# Patient Record
Sex: FEMALE | Race: WHITE | Employment: OTHER | URBAN - NONMETROPOLITAN AREA
[De-identification: names, ages, dates, MRNs, and addresses within clinical notes are randomized per-mention and may not be internally consistent; named-entity substitution may affect disease eponyms.]

---

## 2018-06-18 ENCOUNTER — OFFICE VISIT (OUTPATIENT)
Dept: FAMILY MEDICINE CLINIC | Age: 78
End: 2018-06-18
Payer: MEDICARE

## 2018-06-18 VITALS
SYSTOLIC BLOOD PRESSURE: 160 MMHG | BODY MASS INDEX: 19.8 KG/M2 | OXYGEN SATURATION: 96 % | WEIGHT: 123.2 LBS | HEIGHT: 66 IN | HEART RATE: 91 BPM | DIASTOLIC BLOOD PRESSURE: 92 MMHG

## 2018-06-18 DIAGNOSIS — L28.0 LICHENOID DERMATITIS: ICD-10-CM

## 2018-06-18 DIAGNOSIS — J30.9 CHRONIC ALLERGIC RHINITIS, UNSPECIFIED SEASONALITY, UNSPECIFIED TRIGGER: ICD-10-CM

## 2018-06-18 DIAGNOSIS — I10 ESSENTIAL HYPERTENSION: ICD-10-CM

## 2018-06-18 PROCEDURE — 99202 OFFICE O/P NEW SF 15 MIN: CPT | Performed by: FAMILY MEDICINE

## 2018-06-18 RX ORDER — LEVOTHYROXINE SODIUM 0.07 MG/1
75 TABLET ORAL DAILY
COMMUNITY
End: 2022-03-11 | Stop reason: SDUPTHER

## 2018-06-18 RX ORDER — ATORVASTATIN CALCIUM 20 MG/1
20 TABLET, FILM COATED ORAL DAILY
COMMUNITY
End: 2022-03-12 | Stop reason: SDUPTHER

## 2018-06-18 RX ORDER — CALCIUM CARBONATE 500(1250)
500 TABLET ORAL DAILY
Status: ON HOLD | COMMUNITY
End: 2021-06-13

## 2018-06-18 RX ORDER — TRIAMCINOLONE ACETONIDE 1 MG/G
CREAM TOPICAL
Qty: 80 G | Refills: 0 | Status: SHIPPED | OUTPATIENT
Start: 2018-06-18 | End: 2018-07-30

## 2018-06-18 RX ORDER — CETIRIZINE HYDROCHLORIDE 10 MG/1
10 TABLET ORAL DAILY
Status: ON HOLD | COMMUNITY
End: 2021-06-13

## 2018-06-18 RX ORDER — METHYLPREDNISOLONE 4 MG/1
TABLET ORAL
Qty: 21 TABLET | Refills: 0 | Status: SHIPPED | OUTPATIENT
Start: 2018-06-18 | End: 2018-06-24

## 2018-06-18 RX ORDER — CHLORTHALIDONE 25 MG/1
25 TABLET ORAL DAILY
Status: ON HOLD | COMMUNITY
End: 2021-06-14 | Stop reason: HOSPADM

## 2018-06-18 ASSESSMENT — ENCOUNTER SYMPTOMS
SORE THROAT: 0
SHORTNESS OF BREATH: 0
VOMITING: 0
RHINORRHEA: 0

## 2018-07-30 ENCOUNTER — OFFICE VISIT (OUTPATIENT)
Dept: FAMILY MEDICINE CLINIC | Age: 78
End: 2018-07-30
Payer: MEDICARE

## 2018-07-30 VITALS
BODY MASS INDEX: 19.29 KG/M2 | DIASTOLIC BLOOD PRESSURE: 72 MMHG | HEIGHT: 64 IN | OXYGEN SATURATION: 99 % | SYSTOLIC BLOOD PRESSURE: 132 MMHG | HEART RATE: 81 BPM | WEIGHT: 113 LBS

## 2018-07-30 DIAGNOSIS — L43.9 LICHEN PLANUS: ICD-10-CM

## 2018-07-30 PROCEDURE — 99213 OFFICE O/P EST LOW 20 MIN: CPT | Performed by: FAMILY MEDICINE

## 2018-07-30 RX ORDER — CLOBETASOL PROPIONATE 0.5 MG/G
CREAM TOPICAL
COMMUNITY
End: 2018-10-11

## 2018-07-30 ASSESSMENT — ENCOUNTER SYMPTOMS
SHORTNESS OF BREATH: 0
RHINORRHEA: 0
VOMITING: 0
SORE THROAT: 0

## 2018-07-30 ASSESSMENT — PATIENT HEALTH QUESTIONNAIRE - PHQ9
SUM OF ALL RESPONSES TO PHQ QUESTIONS 1-9: 0
1. LITTLE INTEREST OR PLEASURE IN DOING THINGS: 0
SUM OF ALL RESPONSES TO PHQ9 QUESTIONS 1 & 2: 0
2. FEELING DOWN, DEPRESSED OR HOPELESS: 0

## 2018-09-10 ENCOUNTER — TELEPHONE (OUTPATIENT)
Dept: FAMILY MEDICINE CLINIC | Age: 78
End: 2018-09-10

## 2018-09-11 NOTE — TELEPHONE ENCOUNTER
She/He will need to call her PCP for refill on her blood pressure medication. I have seen her but only with respect to a skin problem. Please also fill-in the PCP across the top with the correct information.

## 2018-10-11 ENCOUNTER — OFFICE VISIT (OUTPATIENT)
Dept: FAMILY MEDICINE CLINIC | Age: 78
End: 2018-10-11
Payer: MEDICARE

## 2018-10-11 VITALS
DIASTOLIC BLOOD PRESSURE: 90 MMHG | WEIGHT: 122.4 LBS | BODY MASS INDEX: 21.01 KG/M2 | HEART RATE: 75 BPM | OXYGEN SATURATION: 96 % | SYSTOLIC BLOOD PRESSURE: 168 MMHG

## 2018-10-11 DIAGNOSIS — I10 ESSENTIAL HYPERTENSION: Primary | ICD-10-CM

## 2018-10-11 DIAGNOSIS — E03.9 HYPOTHYROIDISM, UNSPECIFIED TYPE: ICD-10-CM

## 2018-10-11 DIAGNOSIS — E78.2 MIXED HYPERLIPIDEMIA: ICD-10-CM

## 2018-10-11 DIAGNOSIS — H91.93 BILATERAL HEARING LOSS, UNSPECIFIED HEARING LOSS TYPE: ICD-10-CM

## 2018-10-11 PROCEDURE — 99214 OFFICE O/P EST MOD 30 MIN: CPT | Performed by: FAMILY MEDICINE

## 2018-10-11 RX ORDER — TRIAMCINOLONE ACETONIDE 0.25 MG/G
OINTMENT TOPICAL 2 TIMES DAILY
Status: ON HOLD | COMMUNITY
End: 2021-06-13

## 2018-10-11 RX ORDER — LISINOPRIL 20 MG/1
20 TABLET ORAL DAILY
Qty: 30 TABLET | Refills: 3 | Status: ON HOLD | OUTPATIENT
Start: 2018-10-11 | End: 2021-11-25 | Stop reason: HOSPADM

## 2018-10-11 RX ORDER — HYDROCHLOROTHIAZIDE 25 MG/1
25 TABLET ORAL DAILY
Status: ON HOLD | COMMUNITY
End: 2021-06-13

## 2018-10-11 RX ORDER — LISINOPRIL 20 MG/1
20 TABLET ORAL DAILY
COMMUNITY
End: 2018-10-11 | Stop reason: SDUPTHER

## 2018-10-11 ASSESSMENT — ENCOUNTER SYMPTOMS
BLURRED VISION: 0
SHORTNESS OF BREATH: 0

## 2019-01-30 NOTE — PROGRESS NOTES
No answer No VM
lmtrc
lives with her in a mobile home. Family History   Problem Relation Age of Onset    High Blood Pressure Mother      Allergies   Allergen Reactions    Albuterol     Amlodipine     Metformin And Related      Current Outpatient Prescriptions   Medication Sig Dispense Refill    lisinopril (PRINIVIL;ZESTRIL) 20 MG tablet Take 1 tablet by mouth daily 30 tablet 3    clobetasol (TEMOVATE) 0.05 % cream Apply bid M-F only, weekends off      cetirizine (ZYRTEC) 10 MG tablet Take 10 mg by mouth daily      levothyroxine (SYNTHROID) 75 MCG tablet Take 75 mcg by mouth Daily      chlorthalidone (HYGROTON) 25 MG tablet Take 25 mg by mouth daily      atorvastatin (LIPITOR) 20 MG tablet Take 20 mg by mouth daily      calcium carbonate (OSCAL) 500 MG TABS tablet Take 500 mg by mouth daily       No current facility-administered medications for this visit. Objective    Vitals:    10/11/18 1042 10/11/18 1043   BP: (!) 170/90 (!) 168/90   Pulse: 75    SpO2: 96%    Weight: 122 lb 6.4 oz (55.5 kg)        Physical Exam   Constitutional: She appears well-developed and well-nourished. HENT:   Head: Normocephalic and atraumatic. Right Ear: Tympanic membrane, external ear and ear canal normal.   Left Ear: Tympanic membrane, external ear and ear canal normal.   Nose: Nose normal.   Mouth/Throat: Uvula is midline, oropharynx is clear and moist and mucous membranes are normal.   Neck: Normal range of motion. Neck supple. Cardiovascular: Normal rate, regular rhythm and normal heart sounds. No murmur heard. Pulmonary/Chest: Effort normal and breath sounds normal. She has no wheezes. Lymphadenopathy:     She has no cervical adenopathy. Skin: Skin is warm and dry. No rash noted. Assessment & Plan    Diagnosis Orders   1. Essential hypertension  ALT    AST    Basic Metabolic Panel    CBC Auto Differential    Lipid Panel    lisinopril (PRINIVIL;ZESTRIL) 20 MG tablet   2.  Mixed hyperlipidemia  ALT    AST    Lipid

## 2021-06-12 ENCOUNTER — HOSPITAL ENCOUNTER (INPATIENT)
Age: 81
LOS: 3 days | Discharge: HOME | DRG: 683 | End: 2021-06-15
Attending: INTERNAL MEDICINE | Admitting: INTERNAL MEDICINE
Payer: MEDICARE

## 2021-06-12 ENCOUNTER — APPOINTMENT (OUTPATIENT)
Dept: GENERAL RADIOLOGY | Age: 81
DRG: 683 | End: 2021-06-12
Payer: MEDICARE

## 2021-06-12 ENCOUNTER — APPOINTMENT (OUTPATIENT)
Dept: CT IMAGING | Age: 81
DRG: 683 | End: 2021-06-12
Payer: MEDICARE

## 2021-06-12 DIAGNOSIS — S90.31XA CONTUSION OF RIGHT FOOT, INITIAL ENCOUNTER: ICD-10-CM

## 2021-06-12 DIAGNOSIS — R55 SYNCOPE AND COLLAPSE: ICD-10-CM

## 2021-06-12 DIAGNOSIS — R91.1 LUNG NODULE: ICD-10-CM

## 2021-06-12 DIAGNOSIS — N17.9 AKI (ACUTE KIDNEY INJURY) (HCC): Primary | ICD-10-CM

## 2021-06-12 DIAGNOSIS — N39.0 URINARY TRACT INFECTION WITH HEMATURIA, SITE UNSPECIFIED: ICD-10-CM

## 2021-06-12 DIAGNOSIS — R31.9 URINARY TRACT INFECTION WITH HEMATURIA, SITE UNSPECIFIED: ICD-10-CM

## 2021-06-12 LAB
ALBUMIN SERPL-MCNC: 4.4 G/DL (ref 3.5–4.6)
ALP BLD-CCNC: 57 U/L (ref 40–130)
ALT SERPL-CCNC: 6 U/L (ref 0–33)
ANION GAP SERPL CALCULATED.3IONS-SCNC: 10 MEQ/L (ref 9–15)
AST SERPL-CCNC: 16 U/L (ref 0–35)
BACTERIA: ABNORMAL /HPF
BASOPHILS ABSOLUTE: 0 K/UL (ref 0–0.2)
BASOPHILS RELATIVE PERCENT: 0.4 %
BILIRUB SERPL-MCNC: 0.8 MG/DL (ref 0.2–0.7)
BILIRUBIN URINE: NEGATIVE
BLOOD, URINE: ABNORMAL
BUN BLDV-MCNC: 41 MG/DL (ref 8–23)
CALCIUM SERPL-MCNC: 9.4 MG/DL (ref 8.5–9.9)
CHLORIDE BLD-SCNC: 104 MEQ/L (ref 95–107)
CLARITY: ABNORMAL
CO2: 22 MEQ/L (ref 20–31)
COLOR: YELLOW
CREAT SERPL-MCNC: 1.83 MG/DL (ref 0.5–0.9)
EOSINOPHILS ABSOLUTE: 0 K/UL (ref 0–0.7)
EOSINOPHILS RELATIVE PERCENT: 0.4 %
EPITHELIAL CELLS, UA: ABNORMAL /HPF (ref 0–5)
GFR AFRICAN AMERICAN: 32.2
GFR NON-AFRICAN AMERICAN: 26.6
GLOBULIN: 4.2 G/DL (ref 2.3–3.5)
GLUCOSE BLD-MCNC: 125 MG/DL (ref 70–99)
GLUCOSE URINE: NEGATIVE MG/DL
HCT VFR BLD CALC: 33.7 % (ref 37–47)
HEMOGLOBIN: 11.8 G/DL (ref 12–16)
HYALINE CASTS: ABNORMAL /HPF (ref 0–5)
KETONES, URINE: ABNORMAL MG/DL
LACTIC ACID: 1 MMOL/L (ref 0.5–2.2)
LEUKOCYTE ESTERASE, URINE: ABNORMAL
LYMPHOCYTES ABSOLUTE: 0.8 K/UL (ref 1–4.8)
LYMPHOCYTES RELATIVE PERCENT: 11.8 %
MCH RBC QN AUTO: 32.2 PG (ref 27–31.3)
MCHC RBC AUTO-ENTMCNC: 35.1 % (ref 33–37)
MCV RBC AUTO: 91.8 FL (ref 82–100)
MONOCYTES ABSOLUTE: 0.6 K/UL (ref 0.2–0.8)
MONOCYTES RELATIVE PERCENT: 9.7 %
NEUTROPHILS ABSOLUTE: 5 K/UL (ref 1.4–6.5)
NEUTROPHILS RELATIVE PERCENT: 77.7 %
NITRITE, URINE: POSITIVE
PDW BLD-RTO: 12.5 % (ref 11.5–14.5)
PH UA: 5.5 (ref 5–9)
PLATELET # BLD: 205 K/UL (ref 130–400)
POTASSIUM SERPL-SCNC: 5 MEQ/L (ref 3.4–4.9)
PROTEIN UA: ABNORMAL MG/DL
RBC # BLD: 3.67 M/UL (ref 4.2–5.4)
RBC UA: ABNORMAL /HPF (ref 0–5)
SODIUM BLD-SCNC: 136 MEQ/L (ref 135–144)
SPECIFIC GRAVITY UA: 1.02 (ref 1–1.03)
TOTAL CK: 44 U/L (ref 0–170)
TOTAL PROTEIN: 8.6 G/DL (ref 6.3–8)
TROPONIN: <0.01 NG/ML (ref 0–0.01)
TSH SERPL DL<=0.05 MIU/L-ACNC: 0.51 UIU/ML (ref 0.44–3.86)
URINE REFLEX TO CULTURE: YES
UROBILINOGEN, URINE: 1 E.U./DL
WBC # BLD: 6.4 K/UL (ref 4.8–10.8)
WBC UA: >100 /HPF (ref 0–5)

## 2021-06-12 PROCEDURE — 83605 ASSAY OF LACTIC ACID: CPT

## 2021-06-12 PROCEDURE — 80053 COMPREHEN METABOLIC PANEL: CPT

## 2021-06-12 PROCEDURE — 87186 SC STD MICRODIL/AGAR DIL: CPT

## 2021-06-12 PROCEDURE — 6360000002 HC RX W HCPCS: Performed by: NURSE PRACTITIONER

## 2021-06-12 PROCEDURE — 96361 HYDRATE IV INFUSION ADD-ON: CPT

## 2021-06-12 PROCEDURE — 96365 THER/PROPH/DIAG IV INF INIT: CPT

## 2021-06-12 PROCEDURE — 2580000003 HC RX 258: Performed by: PHYSICIAN ASSISTANT

## 2021-06-12 PROCEDURE — 73630 X-RAY EXAM OF FOOT: CPT

## 2021-06-12 PROCEDURE — 36415 COLL VENOUS BLD VENIPUNCTURE: CPT

## 2021-06-12 PROCEDURE — 84443 ASSAY THYROID STIM HORMONE: CPT

## 2021-06-12 PROCEDURE — 2580000003 HC RX 258: Performed by: NURSE PRACTITIONER

## 2021-06-12 PROCEDURE — 87086 URINE CULTURE/COLONY COUNT: CPT

## 2021-06-12 PROCEDURE — 81001 URINALYSIS AUTO W/SCOPE: CPT

## 2021-06-12 PROCEDURE — 82550 ASSAY OF CK (CPK): CPT

## 2021-06-12 PROCEDURE — 1210000000 HC MED SURG R&B

## 2021-06-12 PROCEDURE — 84484 ASSAY OF TROPONIN QUANT: CPT

## 2021-06-12 PROCEDURE — 70450 CT HEAD/BRAIN W/O DYE: CPT

## 2021-06-12 PROCEDURE — 6370000000 HC RX 637 (ALT 250 FOR IP): Performed by: NURSE PRACTITIONER

## 2021-06-12 PROCEDURE — 99283 EMERGENCY DEPT VISIT LOW MDM: CPT

## 2021-06-12 PROCEDURE — 87077 CULTURE AEROBIC IDENTIFY: CPT

## 2021-06-12 PROCEDURE — 85025 COMPLETE CBC W/AUTO DIFF WBC: CPT

## 2021-06-12 PROCEDURE — 6360000002 HC RX W HCPCS: Performed by: PHYSICIAN ASSISTANT

## 2021-06-12 PROCEDURE — 93005 ELECTROCARDIOGRAM TRACING: CPT | Performed by: PHYSICIAN ASSISTANT

## 2021-06-12 PROCEDURE — 71045 X-RAY EXAM CHEST 1 VIEW: CPT

## 2021-06-12 RX ORDER — ONDANSETRON 4 MG/1
4 TABLET, ORALLY DISINTEGRATING ORAL EVERY 8 HOURS PRN
Status: DISCONTINUED | OUTPATIENT
Start: 2021-06-12 | End: 2021-06-15 | Stop reason: HOSPADM

## 2021-06-12 RX ORDER — POTASSIUM CHLORIDE 1.5 G/1.77G
20 POWDER, FOR SOLUTION ORAL DAILY
Status: ON HOLD | COMMUNITY
End: 2021-06-14 | Stop reason: HOSPADM

## 2021-06-12 RX ORDER — POLYETHYLENE GLYCOL 3350 17 G/17G
17 POWDER, FOR SOLUTION ORAL DAILY PRN
Status: DISCONTINUED | OUTPATIENT
Start: 2021-06-12 | End: 2021-06-15 | Stop reason: HOSPADM

## 2021-06-12 RX ORDER — 0.9 % SODIUM CHLORIDE 0.9 %
1000 INTRAVENOUS SOLUTION INTRAVENOUS ONCE
Status: COMPLETED | OUTPATIENT
Start: 2021-06-12 | End: 2021-06-12

## 2021-06-12 RX ORDER — ONDANSETRON 2 MG/ML
4 INJECTION INTRAMUSCULAR; INTRAVENOUS EVERY 6 HOURS PRN
Status: DISCONTINUED | OUTPATIENT
Start: 2021-06-12 | End: 2021-06-15 | Stop reason: HOSPADM

## 2021-06-12 RX ORDER — ATORVASTATIN CALCIUM 20 MG/1
20 TABLET, FILM COATED ORAL NIGHTLY
Status: DISCONTINUED | OUTPATIENT
Start: 2021-06-12 | End: 2021-06-15 | Stop reason: HOSPADM

## 2021-06-12 RX ORDER — LEVOTHYROXINE SODIUM 0.07 MG/1
75 TABLET ORAL DAILY
Status: DISCONTINUED | OUTPATIENT
Start: 2021-06-13 | End: 2021-06-15 | Stop reason: HOSPADM

## 2021-06-12 RX ORDER — ACETAMINOPHEN 650 MG/1
650 SUPPOSITORY RECTAL EVERY 6 HOURS PRN
Status: DISCONTINUED | OUTPATIENT
Start: 2021-06-12 | End: 2021-06-15 | Stop reason: HOSPADM

## 2021-06-12 RX ORDER — SODIUM CHLORIDE 0.9 % (FLUSH) 0.9 %
5-40 SYRINGE (ML) INJECTION EVERY 12 HOURS SCHEDULED
Status: DISCONTINUED | OUTPATIENT
Start: 2021-06-12 | End: 2021-06-15 | Stop reason: HOSPADM

## 2021-06-12 RX ORDER — SODIUM CHLORIDE 9 MG/ML
25 INJECTION, SOLUTION INTRAVENOUS PRN
Status: DISCONTINUED | OUTPATIENT
Start: 2021-06-12 | End: 2021-06-15 | Stop reason: HOSPADM

## 2021-06-12 RX ORDER — METOPROLOL SUCCINATE 100 MG/1
100 TABLET, EXTENDED RELEASE ORAL DAILY
COMMUNITY
End: 2022-03-15 | Stop reason: ALTCHOICE

## 2021-06-12 RX ORDER — SODIUM CHLORIDE 0.9 % (FLUSH) 0.9 %
5-40 SYRINGE (ML) INJECTION PRN
Status: DISCONTINUED | OUTPATIENT
Start: 2021-06-12 | End: 2021-06-15 | Stop reason: HOSPADM

## 2021-06-12 RX ORDER — SODIUM CHLORIDE 9 MG/ML
INJECTION, SOLUTION INTRAVENOUS CONTINUOUS
Status: DISCONTINUED | OUTPATIENT
Start: 2021-06-12 | End: 2021-06-14

## 2021-06-12 RX ORDER — ACETAMINOPHEN 325 MG/1
650 TABLET ORAL EVERY 6 HOURS PRN
Status: DISCONTINUED | OUTPATIENT
Start: 2021-06-12 | End: 2021-06-15 | Stop reason: HOSPADM

## 2021-06-12 RX ORDER — CALCIUM CARBONATE 500(1250)
500 TABLET ORAL DAILY
Status: DISCONTINUED | OUTPATIENT
Start: 2021-06-12 | End: 2021-06-15 | Stop reason: HOSPADM

## 2021-06-12 RX ADMIN — ENOXAPARIN SODIUM 30 MG: 30 INJECTION SUBCUTANEOUS at 22:10

## 2021-06-12 RX ADMIN — SODIUM CHLORIDE 1000 ML: 9 INJECTION, SOLUTION INTRAVENOUS at 15:29

## 2021-06-12 RX ADMIN — ATORVASTATIN CALCIUM 20 MG: 20 TABLET, FILM COATED ORAL at 22:10

## 2021-06-12 RX ADMIN — SODIUM CHLORIDE: 9 INJECTION, SOLUTION INTRAVENOUS at 22:10

## 2021-06-12 RX ADMIN — SODIUM CHLORIDE 1000 ML: 9 INJECTION, SOLUTION INTRAVENOUS at 17:10

## 2021-06-12 RX ADMIN — Medication 500 MG: at 22:10

## 2021-06-12 RX ADMIN — CEFTRIAXONE SODIUM 1000 MG: 1 INJECTION, POWDER, FOR SOLUTION INTRAMUSCULAR; INTRAVENOUS at 17:10

## 2021-06-12 ASSESSMENT — PAIN SCALES - GENERAL
PAINLEVEL_OUTOF10: 8
PAINLEVEL_OUTOF10: 0
PAINLEVEL_OUTOF10: 0

## 2021-06-12 ASSESSMENT — PAIN DESCRIPTION - DESCRIPTORS: DESCRIPTORS: ACHING

## 2021-06-12 ASSESSMENT — ENCOUNTER SYMPTOMS
ABDOMINAL PAIN: 0
COLOR CHANGE: 0
ABDOMINAL DISTENTION: 0
RHINORRHEA: 0
SORE THROAT: 0
EYE DISCHARGE: 0
SHORTNESS OF BREATH: 0
CONSTIPATION: 0

## 2021-06-12 ASSESSMENT — PAIN DESCRIPTION - PAIN TYPE: TYPE: ACUTE PAIN;CHRONIC PAIN

## 2021-06-12 ASSESSMENT — PAIN DESCRIPTION - LOCATION: LOCATION: FOOT

## 2021-06-12 ASSESSMENT — PAIN DESCRIPTION - ORIENTATION: ORIENTATION: RIGHT

## 2021-06-12 NOTE — H&P
[place] [address] MEDICINE    HISTORY AND PHYSICAL EXAM    PATIENT NAME:  Stephanie Hartmann    MRN:  [id number]  SERVICE DATE:  6/12/2021   SERVICE TIME:  5:24 PM    Primary Care Physician: Yovani Soria MD     SUBJECTIVE  CHIEF COMPLAINT: Syncope    HPI:  Stephanie Hartmann is a 79 y.o., , female who  has a past medical history of Allergic rhinitis, Asthma, Hearing loss, Hyperlipidemia, Hypertension, Hypothyroidism, and Lichen planus. that is hospitalized for syncope, UTI, TIN, dehydration. Presented to the ED today with complaints of syncope and left foot injury. Reports that over the past 3 weeks she has had several episodes of syncope and 3 episodes this past 1 week. Denies chest pain, shortness of breath, and N/V/D. Reports that yesterday she was pulling out a drawer to get something out, had a syncopal event and the drawer fell on her right foot. States that over the last 3 weeks has had dizziness that has been ongoing and continuous. Reports today is the first day she has been without dizziness since she arrived into the ED. Reports pain to her right foot and swelling. In the ED, labs and imaging were completed. She was found to have TIN and a UTI.   She was medicated with Rocephin and given a bolus of IV fluids; decision was made to admit under the hospitalist team.     PAST MEDICAL HISTORY:    Past Medical History:   Diagnosis Date    Allergic rhinitis     Asthma     uses flovent    Hearing loss     Hyperlipidemia     Hypertension     Hypothyroidism     Lichen planus     Dr. Lino Pink:    Past Surgical History:   Procedure Laterality Date    HYSTERECTOMY, TOTAL ABDOMINAL      TONSILLECTOMY       FAMILY HISTORY:    Family History   Problem Relation Age of Onset    High Blood Pressure Mother      SOCIAL HISTORY:    Social History     Socioeconomic History    Marital status: Single     Spouse name: Not on file    Number of children: 2    Years of education: Not on file    Highest education level: Not on file   Occupational History    Not on file   Tobacco Use    Smoking status: Never Smoker    Smokeless tobacco: Never Used   Substance and Sexual Activity    Alcohol use: No    Drug use: No    Sexual activity: Not Currently   Other Topics Concern    Not on file   Social History Narrative    Has 2 sons. One of her sons lives with her in a mobile home. Social Determinants of Health     Financial Resource Strain:     Difficulty of Paying Living Expenses:    Food Insecurity:     Worried About Running Out of Food in the Last Year:     [address] in the Last Year:    Transportation Needs:     Lack of Transportation (Medical):  Lack of Transportation (Non-Medical):    Physical Activity:     Days of Exercise per Week:     Minutes of Exercise per Session:    Stress:     Feeling of Stress :    Social Connections:     Frequency of Communication with Friends and Family:     Frequency of Social Gatherings with Friends and Family:     Attends Mu-ism Services:     Active Member of Clubs or Organizations:     Attends Club or Organization Meetings:     Marital Status:    Intimate Partner Violence:     Fear of Current or Ex-Partner:     Emotionally Abused:     Physically Abused:     Sexually Abused:      MEDICATIONS:   Prior to Admission medications    Medication Sig Start Date End Date Taking? Authorizing Provider   lisinopril (PRINIVIL;ZESTRIL) 20 MG tablet Take 1 tablet by mouth daily 10/11/18   Favio Mares MD   hydrochlorothiazide (HYDRODIURIL) 25 MG tablet Take 25 mg by mouth daily    Historical Provider, MD   triamcinolone (KENALOG) 0.025 % ointment Apply topically 2 times daily Apply topically 2 times daily.     Historical Provider, MD   cetirizine (ZYRTEC) 10 MG tablet Take 10 mg by mouth daily    Historical Provider, MD   levothyroxine (SYNTHROID) 75 MCG tablet Take 75 mcg by mouth Daily    Historical Provider, MD   chlorthalidone 121/88   Pulse 70   Temp 98 °F (36.7 °C)   Resp 19   Ht 5' 4\" (1.626 m)   Wt 125 lb (56.7 kg)   SpO2 98%   BMI 21.46 kg/m²     DATA:     Diagnostic tests reviewed for today's visit:    Most recent labs and imaging results reviewed.      LABS:    Recent Results (from the past 24 hour(s))   Comprehensive Metabolic Panel    Collection Time: 06/12/21  3:15 PM   Result Value Ref Range    Sodium 136 135 - 144 mEq/L    Potassium 5.0 (H) 3.4 - 4.9 mEq/L    Chloride 104 95 - 107 mEq/L    CO2 22 20 - 31 mEq/L    Anion Gap 10 9 - 15 mEq/L    Glucose 125 (H) 70 - 99 mg/dL    BUN 41 (H) 8 - 23 mg/dL    CREATININE 1.83 (H) 0.50 - 0.90 mg/dL    GFR Non-African American 26.6 (L) >60    GFR  32.2 (L) >60    Calcium 9.4 8.5 - 9.9 mg/dL    Total Protein 8.6 (H) 6.3 - 8.0 g/dL    Albumin 4.4 3.5 - 4.6 g/dL    Total Bilirubin 0.8 (H) 0.2 - 0.7 mg/dL    Alkaline Phosphatase 57 40 - 130 U/L    ALT 6 0 - 33 U/L    AST 16 0 - 35 U/L    Globulin 4.2 (H) 2.3 - 3.5 g/dL   CBC Auto Differential    Collection Time: 06/12/21  3:15 PM   Result Value Ref Range    WBC 6.4 4.8 - 10.8 K/uL    RBC 3.67 (L) 4.20 - 5.40 M/uL    Hemoglobin 11.8 (L) 12.0 - 16.0 g/dL    Hematocrit 33.7 (L) 37.0 - 47.0 %    MCV 91.8 82.0 - 100.0 fL    MCH 32.2 (H) 27.0 - 31.3 pg    MCHC 35.1 33.0 - 37.0 %    RDW 12.5 11.5 - 14.5 %    Platelets 466 456 - 133 K/uL    Neutrophils % 77.7 %    Lymphocytes % 11.8 %    Monocytes % 9.7 %    Eosinophils % 0.4 %    Basophils % 0.4 %    Neutrophils Absolute 5.0 1.4 - 6.5 K/uL    Lymphocytes Absolute 0.8 (L) 1.0 - 4.8 K/uL    Monocytes Absolute 0.6 0.2 - 0.8 K/uL    Eosinophils Absolute 0.0 0.0 - 0.7 K/uL    Basophils Absolute 0.0 0.0 - 0.2 K/uL   Troponin    Collection Time: 06/12/21  3:15 PM   Result Value Ref Range    Troponin <0.010 0.000 - 0.010 ng/mL   Lactic Acid, Plasma    Collection Time: 06/12/21  3:15 PM   Result Value Ref Range    Lactic Acid 1.0 0.5 - 2.2 mmol/L   Urine Reflex to Culture    Collection Time: 06/12/21  3:15 PM    Specimen: Urine, clean catch   Result Value Ref Range    Color, UA Yellow Straw/Yellow    Clarity, UA CLOUDY (A) Clear    Glucose, Ur Negative Negative mg/dL    Bilirubin Urine Negative Negative    Ketones, Urine TRACE (A) Negative mg/dL    Specific Gravity, UA 1.017 1.005 - 1.030    Blood, Urine TRACE (A) Negative    pH, UA 5.5 5.0 - 9.0    Protein, UA TRACE (A) Negative mg/dL    Urobilinogen, Urine 1.0 <2.0 E.U./dL    Nitrite, Urine POSITIVE (A) Negative    Leukocyte Esterase, Urine LARGE (A) Negative    Urine Reflex to Culture Yes    CK    Collection Time: 06/12/21  3:15 PM   Result Value Ref Range    Total CK 44 0 - 170 U/L   TSH without Reflex    Collection Time: 06/12/21  3:15 PM   Result Value Ref Range    TSH 0.512 0.440 - 3.860 uIU/mL   Microscopic Urinalysis    Collection Time: 06/12/21  3:15 PM   Result Value Ref Range    Bacteria, UA MANY (A) Negative /HPF    Hyaline Casts, UA 0-1 0 - 5 /HPF    WBC, UA >100 (H) 0 - 5 /HPF    RBC, UA 3-5 (A) 0 - 5 /HPF    Epithelial Cells, UA 0-2 0 - 5 /HPF   EKG 12 Lead - Chest Pain    Collection Time: 06/12/21  3:15 PM   Result Value Ref Range    Ventricular Rate 64 BPM    Atrial Rate 64 BPM    P-R Interval 170 ms    QRS Duration 74 ms    Q-T Interval 382 ms    QTc Calculation (Bazett) 394 ms    P Axis 52 degrees    R Axis -40 degrees    T Axis 48 degrees       IMAGING:  CT Head WO Contrast    Result Date: 6/12/2021  CT HEAD WO CONTRAST CLINICAL HISTORY:  syncope COMPARISON: September 7, 20141 TECHNIQUE: Multiple unenhanced serial axial images of the brain from the vertex of the skull to the base of the skull were performed. FINDINGS: The ventricles are dilated. Unchanged size configuration. .  No mass. No midline shift. The cisterns are patent. There are white matter and periventricular changes most likely consistent with chronic small vessel disease. No acute intra-axial or extra-axial findings.  The visualized osseous structures are unremarkable. The visualized portion of the paranasal sinuses, and mastoids are unremarkable. NO ACUTE INTRA-AXIAL OR EXTRA-AXIAL FINDINGS. All CT scans at this facility use dose modulation, iterative reconstruction, and/or weight based dosing when appropriate to reduce radiation dose to as low as reasonably achievable. VTE Prophylaxis: lovenox    ASSESSMENT AND PLAN    TIN with hyperkalemia secondary to dehydration and diuresis: Serum creatinine 1.83 on admission. Avoid nephrotoxic agents wherever possible. Gentle IVF. Repeat BMP in AM. Strict Intake output. Avoiding fluctuations in BP. Hold ACE inhibitor/diuretics. Syncope collapse, etiology unclear: Dehydration likely contributing. Cardio consulted. Echo ordered, check orthostatic VS, telemetry. Neuro consulted, neuro checks Q4hrs, Seizure precautions. Fall precautions. Review home medications and adjust accordingly. Urinary tract infection: continue rocephin pending urine culture results    HTN: Review home medications, hold any nephrotoxic agents    R foot pain: Negative for fractures; slight erythema present    Hyperlipidemia: Already on statin therapy  Hypothyroidism: Resume home medications    Extremely hard of hearing: For communication either text the information or write it down in order for patient to read and she will then verbalize her answers. Slightly is able to read lips. Reports her PCP retired and she has does not have a new PCP has not been seen by a provider in over 5 months. Prior to discharge we will give recommendations for PCP. Patient is interested in Covid vaccination will provide resources for vaccination upon discharge.       Plan of care discussed with: patient and family    SIGNATURE: HENRY Alegria NP  DATE: June 12, 2021  TIME: 5:24 PM   No admitting provider for patient encounter.  - supervising physician

## 2021-06-12 NOTE — CARE COORDINATION
[facility] Case Management Initial Discharge Assessment    Met with patient and her son Ya Bolton at bedside in ER to discuss discharge plan. *Patient does not hear well and does best using her phone to receive communication and respond. PCP: Britni Iverson MD (retired)                                Date of Last Visit: about six months  If no PCP, list provided? Yes - Patient states she is calling to set up care with Dr. Florinda Lake at [facility] primary care. Discharge Planning    Living Arrangements: Patient lives independently at home in a split level house with 5-6 steps to bed/bath. Who do you live with? Son     Who helps you with your care: Patient is independent at baseline, son can assist with whatever is needed. If lives at home: Do you have any barriers navigating in your home? No    Patient can perform ADL? Yes    Current Services (outpatient and in home):  None    Dialysis: No    Is transportation available to get to your appointments? Yes    DME Equipment: None    Respiratory equipment: None    Respiratory provider: YONI     Pharmacy: [address] [place] [address] with Medication Assistance Program?  No      Patient agreeable to [facility]? N/A    Patient agreeable to SNF/Rehab? N/A    Other discharge needs identified? Other - Patient denies needs at time of initial assessment, CM to reassess based on work-up. Does Patient Have a High-Risk for Readmission Diagnosis (CHF, PN, MI, COPD)? No    Initial Discharge Plan? (Note: please see concurrent daily documentation for any updates after initial note). Home with family, no needs anticipated.     Readmission Risk              Risk of Unplanned Readmission:  10         Electronically signed by Cas Sotelo RN on 6/12/2021 at 6:26 PM

## 2021-06-12 NOTE — ED PROVIDER NOTES
Genitourinary: Negative for difficulty urinating and dysuria. Musculoskeletal: Negative for arthralgias. Right foot pain   Skin: Negative for color change. Neurological: Positive for syncope. Negative for dizziness, tremors, weakness, numbness and headaches. Psychiatric/Behavioral: Negative for agitation and confusion. Except as noted above the remainder of the review of systems was reviewed and negative. PAST MEDICAL HISTORY     Past Medical History:   Diagnosis Date    Allergic rhinitis     Asthma     uses flovent    Hearing loss     Hyperlipidemia     Hypertension     Hypothyroidism     Lichen planus     Dr. Flaco Garcia       Past Surgical History:   Procedure Laterality Date    HYSTERECTOMY, TOTAL ABDOMINAL      TONSILLECTOMY           CURRENT MEDICATIONS       Current Discharge Medication List      CONTINUE these medications which have NOT CHANGED    Details   metoprolol succinate (TOPROL XL) 100 MG extended release tablet Take 100 mg by mouth daily      potassium chloride (KLOR-CON) 20 MEQ packet Take 20 mEq by mouth daily      lisinopril (PRINIVIL;ZESTRIL) 20 MG tablet Take 1 tablet by mouth daily  Qty: 30 tablet, Refills: 3    Associated Diagnoses: Essential hypertension      levothyroxine (SYNTHROID) 75 MCG tablet Take 75 mcg by mouth Daily      chlorthalidone (HYGROTON) 25 MG tablet Take 25 mg by mouth daily      atorvastatin (LIPITOR) 20 MG tablet Take 20 mg by mouth daily      calcium carbonate (OSCAL) 500 MG TABS tablet Take 500 mg by mouth daily      hydrochlorothiazide (HYDRODIURIL) 25 MG tablet Take 25 mg by mouth daily      triamcinolone (KENALOG) 0.025 % ointment Apply topically 2 times daily Apply topically 2 times daily.       cetirizine (ZYRTEC) 10 MG tablet Take 10 mg by mouth daily             ALLERGIES     Albuterol, Amlodipine, and Metformin and related    FAMILY HISTORY       Family History   Problem Relation Age of Onset    High Blood Pressure Mother           SOCIAL HISTORY       Social History     Socioeconomic History    Marital status: Single     Spouse name: None    Number of children: 2    Years of education: None    Highest education level: None   Occupational History    None   Tobacco Use    Smoking status: Never Smoker    Smokeless tobacco: Never Used   Substance and Sexual Activity    Alcohol use: No    Drug use: No    Sexual activity: Not Currently   Other Topics Concern    None   Social History Karl    Has 2 sons. One of her sons lives with her in a mobile home. Social Determinants of Health     Financial Resource Strain:     Difficulty of Paying Living Expenses:    Food Insecurity:     Worried About Running Out of Food in the Last Year:     [address] in the Last Year:    Transportation Needs:     Lack of Transportation (Medical):  Lack of Transportation (Non-Medical):    Physical Activity:     Days of Exercise per Week:     Minutes of Exercise per Session:    Stress:     Feeling of Stress :    Social Connections:     Frequency of Communication with Friends and Family:     Frequency of Social Gatherings with Friends and Family:     Attends [employer] Services:     Active Member of Clubs or Organizations:     Attends Club or Organization Meetings:     Marital Status:    Intimate Partner Violence:     Fear of Current or Ex-Partner:     Emotionally Abused:     Physically Abused:     Sexually Abused:        SCREENINGS    [place] Coma Scale  Eye Opening: Spontaneous  Best Verbal Response: Oriented  Best Motor Response: Obeys commands  [place] Coma Scale Score: 15 @FLOW([id number])@      PHYSICAL EXAM    (up to 7 for level 4, 8 or more for level 5)     ED Triage Vitals   BP Temp Temp src Pulse Resp SpO2 Height Weight   -- -- -- -- -- -- -- --       Physical Exam  Vitals and nursing note reviewed. Constitutional:       General: She is not in acute distress.      Appearance: She is well-developed. She is not ill-appearing, toxic-appearing or diaphoretic. HENT:      Head: Normocephalic. Nose: No congestion. Mouth/Throat:      Mouth: Mucous membranes are moist.      Pharynx: No oropharyngeal exudate or posterior oropharyngeal erythema. Eyes:      Extraocular Movements: Extraocular movements intact. Conjunctiva/sclera: Conjunctivae normal.      Pupils: Pupils are equal, round, and reactive to light. Neck:      Vascular: No JVD. Trachea: No tracheal deviation. Cardiovascular:      Rate and Rhythm: Normal rate. Pulses: Normal pulses. Heart sounds: Normal heart sounds. No murmur heard. No friction rub. No gallop. Pulmonary:      Effort: Pulmonary effort is normal. No tachypnea, accessory muscle usage, respiratory distress or retractions. Breath sounds: No stridor. No wheezing, rhonchi or rales. Chest:      Chest wall: No tenderness. Abdominal:      General: Abdomen is flat. Bowel sounds are normal. There is no distension or abdominal bruit. Palpations: There is no shifting dullness, fluid wave, hepatomegaly, splenomegaly, mass or pulsatile mass. Tenderness: There is no abdominal tenderness. There is no right CVA tenderness, left CVA tenderness, guarding or rebound. Negative signs include Carlos's sign, Rovsing's sign and McBurney's sign. Musculoskeletal:         General: No deformity. Cervical back: Normal range of motion and neck supple. No rigidity. Comments: Patient has mild erythema noted across dorsal surface of right foot, first second third toes, no deformity, no lacerations, no ecchymosis. No deformity or crepitus. Skin:     General: Skin is warm and dry. Capillary Refill: Capillary refill takes less than 2 seconds. Coloration: Skin is not jaundiced. Neurological:      General: No focal deficit present. Mental Status: She is alert and oriented to person, place, and time.  Mental status is at baseline. Cranial Nerves: No cranial nerve deficit. Sensory: No sensory deficit. Motor: No weakness. Coordination: Coordination normal.      Comments: Patient is alert and oriented, no facial droop, no slurred speech, no drift upper or lower extremities. Psychiatric:         Mood and Affect: Mood normal.         DIAGNOSTIC RESULTS     EKG: All EKG's are interpreted by the Emergency Department Physician who either signs or Co-signsthis chart in the absence of a cardiologist.    EKG shows normal sinus rhythm 64 bpm there is T wave inversions in leads V1 V2 no other acute ST segment abnormality no ventricular ectopy  ms    RADIOLOGY:   Non-plain filmimages such as CT, Ultrasound and MRI are read by the radiologist. Plain radiographic images are visualized and preliminarily interpreted by the emergency physician with the below findings:    Chest x-ray shows no acute pulmonary process, there is a pulmonary nodule in the right lower lung field, which was seen on previous xray of September 7, 2014    X-ray of the right foot shows no acute fracture or subluxation. Interpretation per the Radiologist below, if available at the time ofthis note:    XR CHEST PORTABLE   Final Result    [facility] [place]. CALCIFIED DENSITY RIGHT LOWER LOBE. CONSIDER FOLLOW-UP CT SCAN THE CHEST WITHOUT TO FURTHER EVALUATE. XR FOOT RIGHT (MIN 3 VIEWS)   Final Result   NO ACUTE FRACTURES      CT Head WO Contrast   Final Result      NO ACUTE INTRA-AXIAL OR EXTRA-AXIAL FINDINGS. All CT scans at this facility use dose modulation, iterative reconstruction, and/or weight based dosing when appropriate to reduce radiation dose to as low as reasonably achievable.                   ED BEDSIDE ULTRASOUND:   Performed by ED Physician - none    LABS:  Labs Reviewed   COMPREHENSIVE METABOLIC PANEL - Abnormal; Notable for the following components:       Result Value    Potassium 5.0 (*)     Glucose 125 (*)     BUN 41 (*)     CREATININE 1.83 (*)     GFR Non- 26.6 (*)     GFR  32.2 (*)     Total Protein 8.6 (*)     Total Bilirubin 0.8 (*)     Globulin 4.2 (*)     All other components within normal limits   CBC WITH AUTO DIFFERENTIAL - Abnormal; Notable for the following components:    RBC 3.67 (*)     Hemoglobin 11.8 (*)     Hematocrit 33.7 (*)     MCH 32.2 (*)     Lymphocytes Absolute 0.8 (*)     All other components within normal limits   URINE RT REFLEX TO CULTURE - Abnormal; Notable for the following components:    Clarity, UA CLOUDY (*)     Ketones, Urine TRACE (*)     Blood, Urine TRACE (*)     Protein, UA TRACE (*)     Nitrite, Urine POSITIVE (*)     Leukocyte Esterase, Urine LARGE (*)     All other components within normal limits   MICROSCOPIC URINALYSIS - Abnormal; Notable for the following components:    Bacteria, UA MANY (*)     WBC, UA >100 (*)     RBC, UA 3-5 (*)     All other components within normal limits   CULTURE, URINE   TROPONIN   LACTIC ACID, PLASMA   CK   TSH WITHOUT REFLEX   BASIC METABOLIC PANEL W/ REFLEX TO MG FOR LOW K   CBC WITH AUTO DIFFERENTIAL       All other labs were within normal range or not returned as of this dictation. EMERGENCY DEPARTMENT COURSE and DIFFERENTIAL DIAGNOSIS/MDM:   Vitals:    Vitals:    06/12/21 1530 06/12/21 1647 06/12/21 2011   BP: 105/76 121/88 139/80   Pulse: 78 70 57   Resp: 19  16   Temp: 98 °F (36.7 °C)  97.5 °F (36.4 °C)   TempSrc:   Oral   SpO2: 98%  98%   Weight: 125 lb (56.7 kg)     Height: 5' 4\" (1.626 m)              MDM  Number of Diagnoses or Management Options  TIN (acute kidney injury) ([facility] [address]  Contusion of right foot, initial encounter  Lung nodule  Syncope and collapse  Urinary tract infection with hematuria, site unspecified  Diagnosis management comments: Patient presented ED with complaint of syncopal episodes, which patient states been ongoing for approximately 1 week.   She states she has had 3 episodes, with her most recent this morning. She states she was standing in the kitchen, she pulled her up to get something out the door, and had a syncopal episode on the door fell and striking her on the right foot, x-rays of the right foot were completed which shows no acute fracture or subluxation. Labs were obtained, including cardiac evaluation. Patient does show significant increasing renal function for concerns of dehydration, acute kidney injury. She did receive IV hydration while in the emerge department and did feel somewhat better. Patient also shows concerns for urinary tract infection, and was started on Rocephin while in the ER. [facility] for acute kidney injury, urinary tract infection, dehydration. Right foot contusion. CRITICAL CARE TIME   Total Critical Care time was 0 minutes, excluding separately reportableprocedures. There was a high probability of clinicallysignificant/life threatening deterioration in the patient's condition which required my urgent intervention. CONSULTS:  IP CONSULT TO HOSPITALIST  IP CONSULT TO CARDIOLOGY  IP CONSULT TO NEUROLOGY    PROCEDURES:  Unless otherwise noted below, none     Procedures    FINAL IMPRESSION      1. TIN (acute kidney injury) ([address]    2. Urinary tract infection with hematuria, site unspecified    3. Syncope and collapse    4. Contusion of right foot, initial encounter    5. Lung nodule          DISPOSITION/PLAN   DISPOSITION Admitted 06/12/2021 05:44:37 PM      PATIENT REFERRED TO:  No follow-up provider specified.     DISCHARGE MEDICATIONS:  Current Discharge Medication List             (Please note that portions of this note were completed with a voice recognition program.  Efforts were made to edit the dictations but occasionally words are mis-transcribed.)    Rajani Mcdowell PA-C (electronically signed)  Attending Emergency Physician

## 2021-06-13 LAB
ANION GAP SERPL CALCULATED.3IONS-SCNC: 14 MEQ/L (ref 9–15)
BASOPHILS ABSOLUTE: 0 K/UL (ref 0–0.2)
BASOPHILS RELATIVE PERCENT: 0.6 %
BUN BLDV-MCNC: 27 MG/DL (ref 8–23)
CALCIUM SERPL-MCNC: 9.6 MG/DL (ref 8.5–9.9)
CHLORIDE BLD-SCNC: 104 MEQ/L (ref 95–107)
CO2: 18 MEQ/L (ref 20–31)
CREAT SERPL-MCNC: 1.22 MG/DL (ref 0.5–0.9)
EOSINOPHILS ABSOLUTE: 0.1 K/UL (ref 0–0.7)
EOSINOPHILS RELATIVE PERCENT: 1.6 %
GFR AFRICAN AMERICAN: 51.4
GFR NON-AFRICAN AMERICAN: 42.5
GLUCOSE BLD-MCNC: 84 MG/DL (ref 70–99)
HCT VFR BLD CALC: 32.2 % (ref 37–47)
HEMOGLOBIN: 11.2 G/DL (ref 12–16)
LYMPHOCYTES ABSOLUTE: 0.8 K/UL (ref 1–4.8)
LYMPHOCYTES RELATIVE PERCENT: 17 %
MCH RBC QN AUTO: 32.3 PG (ref 27–31.3)
MCHC RBC AUTO-ENTMCNC: 34.7 % (ref 33–37)
MCV RBC AUTO: 93.1 FL (ref 82–100)
MONOCYTES ABSOLUTE: 0.4 K/UL (ref 0.2–0.8)
MONOCYTES RELATIVE PERCENT: 8.6 %
NEUTROPHILS ABSOLUTE: 3.4 K/UL (ref 1.4–6.5)
NEUTROPHILS RELATIVE PERCENT: 72.2 %
PDW BLD-RTO: 12.5 % (ref 11.5–14.5)
PLATELET # BLD: 170 K/UL (ref 130–400)
POTASSIUM REFLEX MAGNESIUM: 4.1 MEQ/L (ref 3.4–4.9)
RBC # BLD: 3.46 M/UL (ref 4.2–5.4)
SODIUM BLD-SCNC: 136 MEQ/L (ref 135–144)
WBC # BLD: 4.8 K/UL (ref 4.8–10.8)

## 2021-06-13 PROCEDURE — 6370000000 HC RX 637 (ALT 250 FOR IP): Performed by: NURSE PRACTITIONER

## 2021-06-13 PROCEDURE — 36415 COLL VENOUS BLD VENIPUNCTURE: CPT

## 2021-06-13 PROCEDURE — 85025 COMPLETE CBC W/AUTO DIFF WBC: CPT

## 2021-06-13 PROCEDURE — 2580000003 HC RX 258: Performed by: NURSE PRACTITIONER

## 2021-06-13 PROCEDURE — 6360000002 HC RX W HCPCS: Performed by: NURSE PRACTITIONER

## 2021-06-13 PROCEDURE — 80048 BASIC METABOLIC PNL TOTAL CA: CPT

## 2021-06-13 PROCEDURE — 6370000000 HC RX 637 (ALT 250 FOR IP): Performed by: INTERNAL MEDICINE

## 2021-06-13 PROCEDURE — 1210000000 HC MED SURG R&B

## 2021-06-13 PROCEDURE — 99223 1ST HOSP IP/OBS HIGH 75: CPT | Performed by: INTERNAL MEDICINE

## 2021-06-13 RX ORDER — POTASSIUM CHLORIDE 20 MEQ/1
TABLET, EXTENDED RELEASE ORAL
Status: ON HOLD | COMMUNITY
Start: 2021-04-26 | End: 2021-06-14 | Stop reason: HOSPADM

## 2021-06-13 RX ORDER — FLUTICASONE PROPIONATE 44 MCG
AEROSOL WITH ADAPTER (GRAM) INHALATION
COMMUNITY
Start: 2021-04-28 | End: 2022-05-05 | Stop reason: SDUPTHER

## 2021-06-13 RX ORDER — FLUTICASONE PROPIONATE 110 UG/1
1 AEROSOL, METERED RESPIRATORY (INHALATION) 2 TIMES DAILY
Status: DISCONTINUED | OUTPATIENT
Start: 2021-06-13 | End: 2021-06-15 | Stop reason: HOSPADM

## 2021-06-13 RX ORDER — METOPROLOL SUCCINATE 100 MG/1
100 TABLET, EXTENDED RELEASE ORAL DAILY
Status: DISCONTINUED | OUTPATIENT
Start: 2021-06-13 | End: 2021-06-15 | Stop reason: HOSPADM

## 2021-06-13 RX ADMIN — METOPROLOL SUCCINATE 100 MG: 100 TABLET, EXTENDED RELEASE ORAL at 15:31

## 2021-06-13 RX ADMIN — Medication 500 MG: at 08:15

## 2021-06-13 RX ADMIN — LEVOTHYROXINE SODIUM 75 MCG: 0.07 TABLET ORAL at 05:59

## 2021-06-13 RX ADMIN — ACETAMINOPHEN 650 MG: 325 TABLET ORAL at 05:59

## 2021-06-13 RX ADMIN — SODIUM CHLORIDE: 9 INJECTION, SOLUTION INTRAVENOUS at 16:05

## 2021-06-13 RX ADMIN — CEFTRIAXONE SODIUM 1000 MG: 1 INJECTION, POWDER, FOR SOLUTION INTRAMUSCULAR; INTRAVENOUS at 15:30

## 2021-06-13 ASSESSMENT — PAIN SCALES - GENERAL: PAINLEVEL_OUTOF10: 3

## 2021-06-13 ASSESSMENT — ENCOUNTER SYMPTOMS
SHORTNESS OF BREATH: 0
VOMITING: 0
EYES NEGATIVE: 1
WHEEZING: 0
ABDOMINAL PAIN: 0
GASTROINTESTINAL NEGATIVE: 1
ALLERGIC/IMMUNOLOGIC NEGATIVE: 1
NAUSEA: 0

## 2021-06-13 NOTE — PROGRESS NOTES
Physician Progress Note    2021   2:50 PM    Name:  Richard Cervantes  MRN:    [id number]     IP Day: 1     Admit Date: 2021  2:53 PM  PCP: Rick Anderson MD    Code Status:  Full Code      Assessment and Plan: Active Problems/ diagnosis:     Generalized weakness  Syncope-likely due to dehydration and generalized weakness in the setting of infection. Does not seem to be neurogenic. UTI present on admission  TIN  Hearing loss-very hard of hearing  Hypertension  Hyperlipidemia      Plan  Resume IV Rocephin, grown E. coli in the urine  Resume IV hydration  Creatinine much better today. Monitor. Hold nephrotoxic meds  Home medication resumed  Neurology and cardiology consulted for syncope. Follow-up echocardiogram  Regular diet  Recheck renal function. DVT PPx     Subjective:     Feels well today.     Physical Examination:      Vitals:  BP (!) 117/54   Pulse 64   Temp 97.9 °F (36.6 °C) (Oral)   Resp 17   Ht 5' 4\" (1.626 m)   Wt 125 lb (56.7 kg)   SpO2 92%   BMI 21.46 kg/m²   Temp (24hrs), Av.8 °F (36.6 °C), Min:97.5 °F (36.4 °C), Max:98 °F (36.7 °C)      General appearance: alert, cooperative and no distress  Mental Status: oriented to person, place and time and normal affect  Lungs: clear to auscultation bilaterally, normal effort  Heart: regular rate and rhythm  Abdomen: soft, nontender, nondistended, bowel sounds present, no masses  Extremities: no edema, redness, tenderness in the calves  Skin: no gross lesions, rashes  Neurological-nonfocal, very hard of hearing, moving all extremities, sensation intact throughout, strength intact throughout symmetric with good muscle bulk and tone 5/5 throughout    Data:     Labs:  Recent Labs     21  1515 21  1223   WBC 6.4 4.8   HGB 11.8* 11.2*    170     Recent Labs     21  1515 21  1223    136   K 5.0* 4.1    104   CO2 22 18*   BUN 41* 27*   CREATININE 1.83* 1.22*   GLUCOSE 125* 84     Recent Labs 06/12/21  1515   AST 16   ALT 6   BILITOT 0.8*   ALKPHOS 57       Current Facility-Administered Medications   Medication Dose Route Frequency Provider Last Rate Last Admin    metoprolol succinate (TOPROL XL) extended release tablet 100 mg  100 mg Oral Daily Pitney Chemo, DO        fluticasone (FLOVENT HFA) 110 MCG/ACT inhaler 1 puff  1 puff Inhalation BID Pitney Chemo, DO        sodium chloride flush 0.9 % injection 5-40 mL  5-40 mL Intravenous 2 times per day Madolyn [place], APRN - NP        sodium chloride flush 0.9 % injection 5-40 mL  5-40 mL Intravenous PRN Madolyn [place], APRN - NP        0.9 % sodium chloride infusion  25 mL Intravenous PRN Madolyn [place], APRN - NP        enoxaparin (LOVENOX) injection 30 mg  30 mg Subcutaneous Daily Madolyn [place], APRN - NP   30 mg at 06/12/21 2210    ondansetron (ZOFRAN-ODT) disintegrating tablet 4 mg  4 mg Oral Q8H PRN Madolyn [place], APRN - NP        Or    ondansetron (ZOFRAN) injection 4 mg  4 mg Intravenous Q6H PRN Madolyn [place], APRN - NP        polyethylene glycol (GLYCOLAX) packet 17 g  17 g Oral Daily PRN Madolyn [place], APRN - NP        acetaminophen (TYLENOL) tablet 650 mg  650 mg Oral Q6H PRN Madolyn [place], APRN - NP   650 mg at 06/13/21 0559    Or    acetaminophen (TYLENOL) suppository 650 mg  650 mg Rectal Q6H PRN Madolyn [place], APRN - NP        cefTRIAXone (ROCEPHIN) 1000 mg IVPB in 50 mL D5W minibag  1,000 mg Intravenous Q24H Madolyn [place], APRN - NP        0.9 % sodium chloride infusion   Intravenous Continuous Madolyn [place], APRN - NP 75 mL/hr at 06/12/21 2210 New Bag at 06/12/21 2210    atorvastatin (LIPITOR) tablet 20 mg  20 mg Oral Nightly Madolyn [place], APRN - NP   20 mg at 06/12/21 2210    calcium elemental (OSCAL) tablet 500 mg  500 mg Oral Daily Madolyn [place], APRN - NP   500 mg at 06/13/21 0815    levothyroxine (SYNTHROID) tablet 75 mcg  75 mcg Oral Daily HENRY Hermosillo NP   75 mcg at 06/13/21 1586       Additional work up or/and treatment plan may be added today or then after based on clinical progression. I am managing a portion of pt care. Some medical issues are handled by other specialists. Additional work up and treatment should be done in out pt setting by pt PCP and other out pt providers. In addition to examining and evaluating pt, I spent additional time explaining care, normaland abnormal findings, and treatment plan. All of pt questions were answered. Counseling, diet and education were provided. Case will be discussed with nursing staff when appropriate. Family will be updated if and when appropriate.        Electronically signed by Gabrielle Amador DO on 6/13/2021 at 2:50 PM

## 2021-06-13 NOTE — CARE COORDINATION
MET WITH PATIENT, FREEDOM OF CHOICE OFFERED.  STATES PLAN REMAINS HOME WITH SON,DENIES DC NEEDS A THIS TIME. WOULD BENEFIT FROM PT/OT ASSESSMENT FOR HOME SAFETY

## 2021-06-13 NOTE — CONSULTS
loss    Asthma    TIN (acute kidney injury) ([address]       Past Medical History:   Diagnosis Date    Allergic rhinitis     Asthma     uses flovent    Hearing loss     Hyperlipidemia     Hypertension     Hypothyroidism     Lichen planus     Dr. Yolie Alanis       Family History   Problem Relation Age of Onset    High Blood Pressure Mother        Past Surgical History:   Procedure Laterality Date    HYSTERECTOMY, TOTAL ABDOMINAL      TONSILLECTOMY          Social History     Socioeconomic History    Marital status: Single     Spouse name: None    Number of children: 2    Years of education: None    Highest education level: None   Occupational History    None   Tobacco Use    Smoking status: Never Smoker    Smokeless tobacco: Never Used   Substance and Sexual Activity    Alcohol use: No    Drug use: No    Sexual activity: Not Currently   Other Topics Concern    None   Social History Narrative    Has 2 sons. One of her sons lives with her in a mobile home. Social Determinants of Health     Financial Resource Strain:     Difficulty of Paying Living Expenses:    Food Insecurity:     Worried About Running Out of Food in the Last Year:     [address] in the Last Year:    Transportation Needs:     Lack of Transportation (Medical):      Lack of Transportation (Non-Medical):    Physical Activity:     Days of Exercise per Week:     Minutes of Exercise per Session:    Stress:     Feeling of Stress :    Social Connections:     Frequency of Communication with Friends and Family:     Frequency of Social Gatherings with Friends and Family:     Attends Jew Services:     Active Member of Clubs or Organizations:     Attends Club or Organization Meetings:     Marital Status:    Intimate Partner Violence:     Fear of Current or Ex-Partner:     Emotionally Abused:     Physically Abused:     Sexually Abused:         Vitals:    06/13/21 1535   BP: (!) 136/59   Pulse: 73   Resp:    Temp: SpO2:        Review of Systems: Extensively reviewed x10 systems is otherwise stated negative other than as stated in HPI    Physical Exam  Constitutional:       General: She is not in acute distress. Appearance: She is well-developed. She is not ill-appearing, toxic-appearing or diaphoretic. HENT:      Head: Normocephalic. Nose: No congestion. Mouth/Throat:      Mouth: Mucous membranes are moist.      Pharynx: No oropharyngeal exudate or posterior oropharyngeal erythema. Eyes:      Extraocular Movements: Extraocular movements intact. Conjunctiva/sclera: Conjunctivae normal.      Pupils: Pupils are equal, round, and reactive to light. Neck:      Vascular: No JVD. Trachea: No tracheal deviation. Cardiovascular:      Rate and Rhythm: Normal rate. Pulses: Normal pulses. Heart sounds: Normal heart sounds. No murmur heard. No friction rub. No gallop. Pulmonary:      Effort: Pulmonary effort is normal. No tachypnea, accessory muscle usage, respiratory distress or retractions. Breath sounds: No stridor. No wheezing, rhonchi or rales. Chest:      Chest wall: No tenderness. Abdominal:      General: Abdomen is flat. Bowel sounds are normal. There is no distension or abdominal bruit. Palpations: There is no shifting dullness, fluid wave, hepatomegaly, splenomegaly, mass or pulsatile mass. Tenderness: There is no abdominal tenderness. There is no right CVA tenderness, left CVA tenderness, guarding or rebound. Negative signs include Carlos's sign, Rovsing's sign and McBurney's sign. Musculoskeletal:         General: No deformity. Cervical back: Normal range of motion and neck supple. No rigidity. Comments: Patient has mild erythema noted across dorsal surface of right foot, first second third toes, no deformity, no lacerations, no ecchymosis. No deformity or crepitus. Skin:     General: Skin is warm and dry.       Capillary Refill: Capillary refill takes less than 2 seconds. Coloration: Skin is not jaundiced. Neurological:      General: No focal deficit present. Mental Status: She is alert and oriented to person, place, and time. Mental status is at baseline. Cranial Nerves: No cranial nerve deficit. Sensory: No sensory deficit. Motor: No weakness. Coordination: Coordination normal.      Comments: Patient is alert and oriented, no facial droop, no slurred speech, no drift upper or lower extremities. Psychiatric:         Mood and Affect: Mood normal.   Neurologic Exam: Cranial nerves pupils equal round to light extra muscles are full V1 through V3 intact face is symmetric. Palate elevates was good bilaterally tongue is midline. Good shoulder shrug present bilaterally. Motor examination does show relatively preserved strength 5/5 MRC grade in uppers and lowers proximally as well as distally. Sensory examinations intact to light touch all 4 extremities equally. Reflexes 2 at biceps triceps brachioradialis diminished patellar symmetric Achilles. Finger-to-nose as well as rapid altering movements slightly diminished on the right.     Gait not able to assess    Medications:  Reviewed    Infusion Medications:    sodium chloride      sodium chloride 75 mL/hr at 06/12/21 2210     Scheduled Medications:    metoprolol succinate  100 mg Oral Daily    fluticasone  1 puff Inhalation BID    sodium chloride flush  5-40 mL Intravenous 2 times per day    enoxaparin  30 mg Subcutaneous Daily    cefTRIAXone (ROCEPHIN) IV  1,000 mg Intravenous Q24H    atorvastatin  20 mg Oral Nightly    calcium elemental  500 mg Oral Daily    levothyroxine  75 mcg Oral Daily     PRN Meds: sodium chloride flush, sodium chloride, ondansetron **OR** ondansetron, polyethylene glycol, acetaminophen **OR** acetaminophen    Labs:   Recent Labs     06/12/21  1515 06/13/21  1223   WBC 6.4 4.8   HGB 11.8* 11.2*   HCT 33.7* 32.2*    170     Recent Labs     06/12/21  1515 06/13/21  1223    136   K 5.0* 4.1    104   CO2 22 18*   BUN 41* 27*   CREATININE 1.83* 1.22*   CALCIUM 9.4 9.6     Recent Labs     06/12/21  1515   AST 16   ALT 6   BILITOT 0.8*   ALKPHOS 57     No results for input(s): INR in the last 72 hours. Recent Labs     06/12/21  1515   CKTOTAL 40   TROPONINI <0.010       Urinalysis:   Lab Results   Component Value Date    NITRU POSITIVE 06/12/2021    WBCUA >100 06/12/2021    BACTERIA MANY 06/12/2021    RBCUA 3-5 06/12/2021    BLOODU TRACE 06/12/2021    SPECGRAV 1.017 06/12/2021    GLUCOSEU Negative 06/12/2021       Radiology:   Most recent    EEG No valid procedures specified. MRI of Brain No results found for this or any previous visit. No results found for this or any previous visit. MRA of the Head and Neck: No results found for this or any previous visit. No results found for this or any previous visit. No results found for this or any previous visit. CT of the Head: Results for orders placed during the hospital encounter of 06/12/21    CT Head WO Contrast    Narrative  CT HEAD WO CONTRAST    CLINICAL HISTORY:  syncope    COMPARISON: September 7, 20141    TECHNIQUE: Multiple unenhanced serial axial images of the brain from the vertex of the skull to the base of the skull were performed. FINDINGS: The ventricles are dilated. Unchanged size configuration. .  No mass. No midline shift. The cisterns are patent. There are white matter and periventricular changes most likely consistent with chronic small vessel disease. No acute intra-axial or extra-axial findings. The visualized osseous structures are unremarkable. The visualized portion of the paranasal sinuses, and mastoids are unremarkable. Impression  NO ACUTE INTRA-AXIAL OR EXTRA-AXIAL FINDINGS.     All CT scans at this facility use dose modulation, iterative reconstruction, and/or weight based dosing when appropriate to reduce radiation dose to as low as reasonably achievable. No results found for this or any previous visit. No results found for this or any previous visit. Carotid duplex: No results found for this or any previous visit. No results found for this or any previous visit. No results found for this or any previous visit. Echo No results found for this or any previous visit. Assessment/Plan: 64-year-old female with recurrent episodes of syncope where she will have even presyncopal type symptoms involving lightheadedness and a feeling as if she is going to pass out if she wants it down. The patient has had 3 similar type episodes in the last week some of which have been associated with position changes in 1 of which is associated with painful stimuli. Whether or not these are orthostatic episodes or vasovagal type response or autonomic instability is unclear. We will exclude underlying neurological causes and thus we will make sure we have an MRI of the brain with and without contrast to exclude mass lesion or structural lesion we will also set up an EEG tomorrow to make sure there is no underlying epileptiform activity which also could provoke above episodes.   We will follow her closely clinically and dictation        Collaborating physicians: Dr Alex Gregory    Electronically signed by Zach Bynum MD on 6/13/2021 at 3:42 PM

## 2021-06-13 NOTE — PROGRESS NOTES
Patient assessment complete. Pt alert and oriented. States she has pain in her right foor where dresser fell on it. She is [place]. Verified home meds with patients pharmacy at [facility]. Pt states her lower extremities always itching. Multiple scratches noted on bilateral lower extremities. Vitals stable. Will continue to monitor.

## 2021-06-13 NOTE — PROGRESS NOTES
Shift assessment complete. VS are stable. Pt AxOx4. Can be confused at times. [place]. Lungs clear. Bowel sounds present. Pt unaware of meds at this time. I called son and he gave me 4 meds, but didn't seem completely sure. Will pass along to call pharmacy. Skin has scabs/rash on BLE. Pt denies needs at this time. Call light within reach. Will continue to monitor.     Electronically signed by Allegra Devine RN on 6/12/2021 at 10:30 PM

## 2021-06-14 PROBLEM — R55 SYNCOPE: Status: ACTIVE | Noted: 2021-06-14

## 2021-06-14 PROBLEM — N39.0 E. COLI UTI: Status: ACTIVE | Noted: 2021-06-14

## 2021-06-14 PROBLEM — B96.20 E. COLI UTI: Status: ACTIVE | Noted: 2021-06-14

## 2021-06-14 LAB
EKG ATRIAL RATE: 64 BPM
EKG P AXIS: 52 DEGREES
EKG P-R INTERVAL: 170 MS
EKG Q-T INTERVAL: 382 MS
EKG QRS DURATION: 74 MS
EKG QTC CALCULATION (BAZETT): 394 MS
EKG R AXIS: -40 DEGREES
EKG T AXIS: 48 DEGREES
EKG VENTRICULAR RATE: 64 BPM
LV EF: 60 %
LVEF MODALITY: NORMAL
ORGANISM: ABNORMAL
URINE CULTURE, ROUTINE: ABNORMAL

## 2021-06-14 PROCEDURE — 99232 SBSQ HOSP IP/OBS MODERATE 35: CPT | Performed by: INTERNAL MEDICINE

## 2021-06-14 PROCEDURE — 93306 TTE W/DOPPLER COMPLETE: CPT

## 2021-06-14 PROCEDURE — 2580000003 HC RX 258: Performed by: NURSE PRACTITIONER

## 2021-06-14 PROCEDURE — 1210000000 HC MED SURG R&B

## 2021-06-14 PROCEDURE — 6360000002 HC RX W HCPCS: Performed by: NURSE PRACTITIONER

## 2021-06-14 PROCEDURE — 6370000000 HC RX 637 (ALT 250 FOR IP): Performed by: INTERNAL MEDICINE

## 2021-06-14 PROCEDURE — 6370000000 HC RX 637 (ALT 250 FOR IP): Performed by: NURSE PRACTITIONER

## 2021-06-14 PROCEDURE — 93010 ELECTROCARDIOGRAM REPORT: CPT | Performed by: INTERNAL MEDICINE

## 2021-06-14 PROCEDURE — 99233 SBSQ HOSP IP/OBS HIGH 50: CPT | Performed by: PSYCHIATRY & NEUROLOGY

## 2021-06-14 RX ADMIN — ATORVASTATIN CALCIUM 20 MG: 20 TABLET, FILM COATED ORAL at 00:26

## 2021-06-14 RX ADMIN — ACETAMINOPHEN 650 MG: 325 TABLET ORAL at 13:14

## 2021-06-14 RX ADMIN — ATORVASTATIN CALCIUM 20 MG: 20 TABLET, FILM COATED ORAL at 22:17

## 2021-06-14 RX ADMIN — ENOXAPARIN SODIUM 30 MG: 30 INJECTION SUBCUTANEOUS at 22:17

## 2021-06-14 RX ADMIN — Medication 500 MG: at 13:06

## 2021-06-14 RX ADMIN — LEVOTHYROXINE SODIUM 75 MCG: 0.07 TABLET ORAL at 06:23

## 2021-06-14 RX ADMIN — METOPROLOL SUCCINATE 100 MG: 100 TABLET, EXTENDED RELEASE ORAL at 13:06

## 2021-06-14 RX ADMIN — ENOXAPARIN SODIUM 30 MG: 30 INJECTION SUBCUTANEOUS at 00:26

## 2021-06-14 ASSESSMENT — PAIN SCALES - GENERAL
PAINLEVEL_OUTOF10: 0
PAINLEVEL_OUTOF10: 5

## 2021-06-14 NOTE — PROGRESS NOTES
Chief Complaint   Patient presents with    Loss of Consciousness    Foot Injury          Patient is a 79 y.o. female who presents with a chief complaint of lightheadedness/dizziness and syncope. . Patient is followed on a regular basis by Dr. Mercedes Gandara MD.  Patient stated she has had about 3 episodes of lightheadedness/dizziness at rest or with exertion. She states she feels like lights are going to go out and she gets close to passing out. She denies any history of myocardial infarction, congestive heart failure or arrhythmia. Denies any history of stress test or cardiac catheterization. Patient is very hard of hearing. Initial neurological work-up is negative. Cardiac enzymes are negative. Patient with acute kidney injury currently improved. She is being treated for urinary tract infection. She does have history of hypertension as well as hyperlipidemia. 6/14/2021: Echo with normal LV function no valve abnormalities. Hemodynamically stable. No alarms on telemetry. Summary   Aortic valve leaflets are mildly thickened. Mild aortic regurgitation is noted. Normal tricuspid valve structure and function. There is mild ( 1 +) tricuspid regurgitation with estimated RVSP of 47 mm   Hg. Mildly dilated left atrium. Left ventricular ejection fraction is visually estimated at 60%. Pseudonormal filling pattern noted.    Mild concentric left ventricular hypertrophy.       Past Medical History        Past Medical History:   Diagnosis Date    Allergic rhinitis      Asthma       uses flovent    Hearing loss      Hyperlipidemia      Hypertension      Hypothyroidism      Lichen planus       Dr. Serena Werner             Patient Active Problem List   Diagnosis    Hypothyroidism    Allergic rhinitis    Hypertension    Hyperlipidemia    Lichen planus    Hearing loss    Asthma    [address]IN (acute [address]) ([facility] [address]         Past Surgical History         Past Surgical History:   Procedure Laterality Date    HYSTERECTOMY, TOTAL ABDOMINAL        TONSILLECTOMY                Social History   Social History            Socioeconomic History    Marital status: Single       Spouse name: None    Number of children: 2    Years of education: None    Highest education level: None   Occupational History    None   Tobacco Use    Smoking status: Never Smoker    Smokeless tobacco: Never Used   Substance and Sexual Activity    Alcohol use: No    Drug use: No    Sexual activity: Not Currently   Other Topics Concern    None   Social History Karl     Has 2 sons. One of her sons lives with her in a mobile home.      Social Determinants of Health          Financial Resource Strain:     Difficulty of Paying Living Expenses:    Food Insecurity:     Worried About Running Out of Food in the Last Year:     [address] in the Last Year:    Transportation Needs:     Lack of Transportation (Medical):      Lack of Transportation (Non-Medical):    Physical Activity:     Days of Exercise per Week:     Minutes of Exercise per Session:    Stress:     Feeling of Stress :    Social Connections:     Frequency of Communication with Friends and Family:     Frequency of Social Gatherings with Friends and Family:     Attends Scientologist Services:     Active Member of Clubs or Organizations:     Attends Club or Organization Meetings:     Marital Status:    Intimate Partner Violence:     Fear of Current or Ex-Partner:     Emotionally Abused:     Physically Abused:     Sexually Abused:             Family History         Family History   Problem Relation Age of Onset    High Blood Pressure Mother              Current Facility-Administered Medications             Current Facility-Administered Medications   Medication Dose Route Frequency Provider Last Rate Last Admin    metoprolol succinate (TOPROL XL) extended release tablet 100 mg  100 mg Oral Daily Zahida Mclain DO   100 mg at 06/13/21 1531    fluticasone (FLOVENT HFA) 110 MCG/ACT inhaler 1 puff  1 puff Inhalation BID Pitney Chemo, DO        sodium chloride flush 0.9 % injection 5-40 mL  5-40 mL Intravenous 2 times per day Prospect Agge, APRN - NP        sodium chloride flush 0.9 % injection 5-40 mL  5-40 mL Intravenous PRN Prospect Gage, APRN - NP        0.9 % sodium chloride infusion  25 mL Intravenous PRN Prospect Gage, APRN - NP        enoxaparin (LOVENOX) injection 30 mg  30 mg Subcutaneous Daily Prospect Gage, APRN - NP   30 mg at 06/12/21 2210    ondansetron (ZOFRAN-ODT) disintegrating tablet 4 mg  4 mg Oral Q8H PRN Prospect Gage, APRN - NP         Or    ondansetron (ZOFRAN) injection 4 mg  4 mg Intravenous Q6H PRN Prospect Gage, APRN - NP        polyethylene glycol (GLYCOLAX) packet 17 g  17 g Oral Daily PRN Cosme Gage, APRN - NP        acetaminophen (TYLENOL) tablet 650 mg  650 mg Oral Q6H PRN Cosme Gage, APRN - NP   650 mg at 06/13/21 0559     Or    acetaminophen (TYLENOL) suppository 650 mg  650 mg Rectal Q6H PRN Prospect Gage, APRN - NP        cefTRIAXone (ROCEPHIN) 1000 mg IVPB in 50 mL D5W minibag  1,000 mg Intravenous Q24H Prospect Gage, APRN - NP   Stopped at 06/13/21 1651    0.9 % sodium chloride infusion   Intravenous Continuous Cosme Gage, APRN - NP 75 mL/hr at 06/13/21 1605 New Bag at 06/13/21 1605    atorvastatin (LIPITOR) tablet 20 mg  20 mg Oral Nightly Cosme Gage, APRN - NP   20 mg at 06/12/21 2210    calcium elemental (OSCAL) tablet 500 mg  500 mg Oral Daily Prospect Gage, APRN - NP   500 mg at 06/13/21 0815    levothyroxine (SYNTHROID) tablet 75 mcg  75 mcg Oral Daily Prospect Gage, APRN - NP   75 mcg at 06/13/21 0559            ALLERGIES: Albuterol, Amlodipine, and Metformin and related     Review of Systems   Constitutional: Negative. Negative for chills and fever. HENT: Negative. Eyes: Negative. Respiratory: Negative for shortness of breath and wheezing.     Cardiovascular: Negative for chest pain, palpitations and leg swelling. Gastrointestinal: Negative. Negative for abdominal pain, nausea and vomiting. Endocrine: Negative. Genitourinary: Negative. Musculoskeletal: Negative. Skin: Negative. Negative for rash. Allergic/Immunologic: Negative. Neurological: Positive for syncope and light-headedness. Negative for dizziness, weakness and headaches. Psychiatric/Behavioral: Negative.             VITALS:  Blood pressure (!) 136/59, pulse 73, temperature 97.9 °F (36.6 °C), temperature source Oral, resp. rate 17, height 5' 4\" (1.626 m), weight 125 lb (56.7 kg), SpO2 92 %. Body mass index is 21.46 kg/m².     Physical Exam  Constitutional:       Appearance: She is well-developed. She is not diaphoretic. HENT:      Head: Normocephalic and atraumatic. Eyes:      Pupils: Pupils are equal, round, and reactive to light. Neck:      Thyroid: No thyromegaly. Vascular: No JVD. Trachea: No tracheal deviation. Cardiovascular:      Rate and Rhythm: Normal rate and regular rhythm. Chest Wall: PMI is not displaced. Pulses: Intact distal pulses. Heart sounds: Normal heart sounds. Heart sounds not distant. No murmur heard. No friction rub. No gallop. No S3 sounds. Pulmonary:      Effort: No respiratory distress. Breath sounds: No wheezing or rales. Chest:      Chest wall: No tenderness. Abdominal:      General: Bowel sounds are normal. There is no distension. Palpations: Abdomen is soft. There is no mass. Tenderness: There is no abdominal tenderness. There is no guarding or rebound. Musculoskeletal:      Cervical back: Normal range of motion and neck supple. Skin:     General: Skin is warm and dry. Coloration: Skin is not pale. Findings: No erythema or rash. Neurological:      Mental Status: She is alert and oriented to person, place, and time. Cranial Nerves: No cranial nerve deficit.    Psychiatric: Behavior: Behavior normal.         Thought Content: Thought content normal.         Judgment: Judgment normal.            LABS:  Recent Results         Recent Results (from the past 24 hour(s))   Basic Metabolic Panel w/ Reflex to MG     Collection Time: 06/13/21 12:23 PM   Result Value Ref Range     Sodium 136 135 - 144 mEq/L     Potassium reflex Magnesium 4.1 3.4 - 4.9 mEq/L     Chloride 104 95 - 107 mEq/L     CO2 18 (L) 20 - 31 mEq/L     Anion Gap 14 9 - 15 mEq/L     Glucose 84 70 - 99 mg/dL     BUN 27 (H) 8 - 23 mg/dL     CREATININE 1.22 (H) 0.50 - 0.90 mg/dL     GFR Non- 42.5 (L) >60     GFR  51.4 (L) >60     Calcium 9.6 8.5 - 9.9 mg/dL   CBC auto differential     Collection Time: 06/13/21 12:23 PM   Result Value Ref Range     WBC 4.8 4.8 - 10.8 K/uL     RBC 3.46 (L) 4.20 - 5.40 M/uL     Hemoglobin 11.2 (L) 12.0 - 16.0 g/dL     Hematocrit 32.2 (L) 37.0 - 47.0 %     MCV 93.1 82.0 - 100.0 fL     MCH 32.3 (H) 27.0 - 31.3 pg     MCHC 34.7 33.0 - 37.0 %     RDW 12.5 11.5 - 14.5 %     Platelets 619 726 - 321 K/uL     Neutrophils % 72.2 %     Lymphocytes % 17.0 %     Monocytes % 8.6 %     Eosinophils % 1.6 %     Basophils % 0.6 %     Neutrophils Absolute 3.4 1.4 - 6.5 K/uL     Lymphocytes Absolute 0.8 (L) 1.0 - 4.8 K/uL     Monocytes Absolute 0.4 0.2 - 0.8 K/uL     Eosinophils Absolute 0.1 0.0 - 0.7 K/uL     Basophils Absolute 0.0 0.0 - 0.2 K/uL         Troponin:         Lab Results   Component Value Date     TROPONINI <0.010 06/12/2021         EKG: normal sinus rhythm, nonspecific ST and T waves changes        ASSESSMENT:     Syncope/near syncope secondary to dehydration/hypovolemia most likely.     Urinary tract infection     Acute kidney injury-improved     Essential hypertension hyperlipidemia           PLAN:   1. As always, aggressive risk factor modification is strongly recommended.  We should adhere to the JNC VIII guidelines for HTN management and the NCEPATP III guidelines for LDL-C management. 2. Avoid nephrotoxic agents  3. Monitor on telemetry  4. Neuro recommendations  5. GI/DVT prophylaxis  6. Stable for discharge from cardiology standpoint.  Consider outpatient event monitor.

## 2021-06-14 NOTE — CARE COORDINATION
POSSIBLE D/C HOME TODAY IF CLEARED BY NEURO. PATIENT PLAN IS TO RETURN [place] W/ HER SON. CM TO FOLLOW FOR ANY NEW D/C NEEDS. OR CHANGES TO THE D/C PLAN OF CARE.

## 2021-06-14 NOTE — DISCHARGE SUMMARY
[facility] Discharge Summary    Kathie Gregorio  :  1940  MRN:  [id number]    Admit date:  2021    Discharge date:  6/15/2021    Admitting Physician:  Andriy Sherman DO  Primary Care Physician:  López Lantigua MD    Discharge Diagnoses:    Principal Problem:    TIN (acute kidney injury) [facility])  Active Problems:    Syncope    E. coli UTI  Resolved Problems:    * No resolved hospital problems. *    Chief Complaint   Patient presents with    Loss of Consciousness    Foot Injury       Condition: improved  Activity: no restrictions  Diet: regular  Disposition: home  Functional Status: ambulatory    Significant Findings:     Labs Renal Latest Ref Rng & Units 2021   BUN 8 - 23 mg/dL 27(H) 41(H) 16   Cr 0.50 - 0.90 mg/dL 1.22(H) 1.83(H) 0.88   K 3.4 - 4.9 mEq/L 4.1 5. 0(H) 4.2   Na 135 - 144 mEq/L 136 136 138     UCx: E coli    Hospital Course:   67-year-old female with hearing loss, hypertension, hypothyroidism presented with syncope-this is her third episode of the past week. She was found to have dehydration, acute kidney injury, and urinary tract infection which were felt to be the contributing factors behind her syncope. She improved with IV fluid and course of IV ceftriaxone. Her chlorthalidone was discontinued. She will follow closely with her PCP. Cardiology recommended outpatient Holter monitor. She was asymptomatic at the time of discharge. Exam on Discharge:   BP (!) 151/60   Pulse 84   Temp 97.8 °F (36.6 °C) (Oral)   Resp 16   Ht 5' 4\" (1.626 m)   Wt 125 lb (56.7 kg)   SpO2 97%   BMI 21.46 kg/m²   General appearance: alert, cooperative and no distress.  Very hard of hearing  Mental Status: oriented to person, place and time and normal affect  Lungs: clear to auscultation bilaterally, normal effort  Heart: regular rate and rhythm, no murmur  Abdomen: soft, nontender, nondistended, bowel sounds present, no masses  Extremities: no edema, redness, tenderness in the calves  Skin: no gross lesions, rashes    Discharge Medication List:   Mayur Deem   Home Medication Instructions [id number]    Printed on:06/14/21 2622   Medication Information                      atorvastatin (LIPITOR) 20 MG tablet  Take 20 mg by mouth daily             FLOVENT HFA 44 MCG/ACT inhaler  INHALE 1 PUFF BY MOUTH TWICE DAILY FOR 90 DAYS.             levothyroxine (SYNTHROID) 75 MCG tablet  Take 75 mcg by mouth Daily             lisinopril (PRINIVIL;ZESTRIL) 20 MG tablet  Take 1 tablet by mouth daily             metoprolol succinate (TOPROL XL) 100 MG extended release tablet  Take 100 mg by mouth daily                 DC time 37 minutes    Signed:  Lulu Mcleod DO  6/14/2021, 5:26 PM

## 2021-06-14 NOTE — PROGRESS NOTES
Patient denies any dizziness or lightheadedness for this shift; possible discharge today if cleared by neuro. Message sent to Dr. Alex Gregory      Patient is very [facility]; instructed to have sons more involved to help more with communication. Patient is agreeable.      Electronically signed by Tara Tao RN on 6/14/2021 at 2:38 PM

## 2021-06-15 ENCOUNTER — APPOINTMENT (OUTPATIENT)
Dept: MRI IMAGING | Age: 81
DRG: 683 | End: 2021-06-15
Payer: MEDICARE

## 2021-06-15 VITALS
RESPIRATION RATE: 16 BRPM | TEMPERATURE: 97.5 F | DIASTOLIC BLOOD PRESSURE: 62 MMHG | SYSTOLIC BLOOD PRESSURE: 134 MMHG | OXYGEN SATURATION: 100 % | WEIGHT: 125 LBS | HEIGHT: 64 IN | BODY MASS INDEX: 21.34 KG/M2 | HEART RATE: 79 BPM

## 2021-06-15 PROCEDURE — 6370000000 HC RX 637 (ALT 250 FOR IP): Performed by: NURSE PRACTITIONER

## 2021-06-15 PROCEDURE — 6370000000 HC RX 637 (ALT 250 FOR IP): Performed by: INTERNAL MEDICINE

## 2021-06-15 PROCEDURE — 2580000003 HC RX 258: Performed by: NURSE PRACTITIONER

## 2021-06-15 PROCEDURE — APPSS30 APP SPLIT SHARED TIME 16-30 MINUTES: Performed by: STUDENT IN AN ORGANIZED HEALTH CARE EDUCATION/TRAINING PROGRAM

## 2021-06-15 PROCEDURE — 70551 MRI BRAIN STEM W/O DYE: CPT

## 2021-06-15 PROCEDURE — 2580000003 HC RX 258: Performed by: INTERNAL MEDICINE

## 2021-06-15 PROCEDURE — 99233 SBSQ HOSP IP/OBS HIGH 50: CPT | Performed by: PSYCHIATRY & NEUROLOGY

## 2021-06-15 PROCEDURE — 6360000002 HC RX W HCPCS: Performed by: NURSE PRACTITIONER

## 2021-06-15 PROCEDURE — 70544 MR ANGIOGRAPHY HEAD W/O DYE: CPT

## 2021-06-15 RX ORDER — SODIUM CHLORIDE 9 MG/ML
INJECTION, SOLUTION INTRAVENOUS CONTINUOUS
Status: DISCONTINUED | OUTPATIENT
Start: 2021-06-15 | End: 2021-06-15 | Stop reason: HOSPADM

## 2021-06-15 RX ORDER — LORAZEPAM 2 MG/ML
1 INJECTION INTRAMUSCULAR
Status: DISCONTINUED | OUTPATIENT
Start: 2021-06-15 | End: 2021-06-15 | Stop reason: HOSPADM

## 2021-06-15 RX ADMIN — SODIUM CHLORIDE: 9 INJECTION, SOLUTION INTRAVENOUS at 03:45

## 2021-06-15 RX ADMIN — CEFTRIAXONE SODIUM 1000 MG: 1 INJECTION, POWDER, FOR SOLUTION INTRAMUSCULAR; INTRAVENOUS at 03:12

## 2021-06-15 RX ADMIN — METOPROLOL SUCCINATE 100 MG: 100 TABLET, EXTENDED RELEASE ORAL at 09:53

## 2021-06-15 RX ADMIN — LEVOTHYROXINE SODIUM 75 MCG: 0.07 TABLET ORAL at 05:39

## 2021-06-15 RX ADMIN — Medication 500 MG: at 09:53

## 2021-06-15 ASSESSMENT — PAIN SCALES - GENERAL: PAINLEVEL_OUTOF10: 0

## 2021-06-15 NOTE — CARE COORDINATION
PATIENT TO D/C HOME TODAY ONCE CLEARED BY NEURO. CM TO FOLLOW FOR ANY NEW D/C NEEDS OR CHANGES TO THE D/C PLAN. PLAN IS FOR THE PATIENT TO RETURN HOME W/ HER SON.

## 2021-06-15 NOTE — PROGRESS NOTES
leon Jimenez       Family History   Problem Relation Age of Onset    High Blood Pressure Mother        Past Surgical History:   Procedure Laterality Date    HYSTERECTOMY, TOTAL ABDOMINAL      TONSILLECTOMY          Social History     Socioeconomic History    Marital status: Single     Spouse name: None    Number of children: 2    Years of education: None    Highest education level: None   Occupational History    None   Tobacco Use    Smoking status: Never Smoker    Smokeless tobacco: Never Used   Substance and Sexual Activity    Alcohol use: No    Drug use: No    Sexual activity: Not Currently   Other Topics Concern    None   Social History Narrative    Has 2 sons. One of her sons lives with her in a mobile home. Social Determinants of Health     Financial Resource Strain:     Difficulty of Paying Living Expenses:    Food Insecurity:     Worried About Running Out of Food in the Last Year:     Ran Out of Food in the Last Year:    Transportation Needs:     Lack of Transportation (Medical):     Lack of Transportation (Non-Medical):    Physical Activity:     Days of Exercise per Week:     Minutes of Exercise per Session:    Stress:     Feeling of Stress :    Social Connections:     Frequency of Communication with Friends and Family:     Frequency of Social Gatherings with Friends and Family:     Attends [employer] Services: Active Member of Clubs or Organizations:     Attends Club or Organization Meetings:     Marital Status:    Intimate Partner Violence:     Fear of Current or Ex-Partner:     Emotionally Abused:     Physically Abused:     Sexually Abused:         Vitals:    06/15/21 0843   BP: 134/62   Pulse: 79   Resp:    Temp:    SpO2: 100%       Review of Systems   Cardiovascular: Positive for syncope.   : Extensively reviewed x10 systems is otherwise stated negative other than as stated in HPI    Physical Exam  Constitutional:       General: She is not in acute distress.      Appearance: She is well-developed. She is not ill-appearing, toxic-appearing or diaphoretic. HENT:      Head: Normocephalic. Nose: No congestion. Mouth/Throat:      Mouth: Mucous membranes are moist.      Pharynx: No oropharyngeal exudate or posterior oropharyngeal erythema. Eyes:      Extraocular Movements: Extraocular movements intact. Conjunctiva/sclera: Conjunctivae normal.      Pupils: Pupils are equal, round, and reactive to light. Neck:      Vascular: No JVD. Trachea: No tracheal deviation. Cardiovascular:      Rate and Rhythm: Normal rate. Pulses: Normal pulses. Heart sounds: Normal heart sounds. No murmur heard. No friction rub. No gallop. Pulmonary:      Effort: Pulmonary effort is normal. No tachypnea, accessory muscle usage, respiratory distress or retractions. Breath sounds: No stridor. No wheezing, rhonchi or rales. Chest:      Chest wall: No tenderness. Abdominal:      General: Abdomen is flat. Bowel sounds are normal. There is no distension or abdominal bruit. Palpations: There is no shifting dullness, fluid wave, hepatomegaly, splenomegaly, mass or pulsatile mass. Tenderness: There is no abdominal tenderness. There is no right CVA tenderness, left CVA tenderness, guarding or rebound. Negative signs include Carlos's sign, Rovsing's sign and McBurney's sign. Musculoskeletal:         General: No deformity. Cervical back: Normal range of motion and neck supple. No rigidity. Comments: Patient has mild erythema noted across dorsal surface of right foot, first second third toes, no deformity, no lacerations, no ecchymosis. No deformity or crepitus. Skin:     General: Skin is warm and dry. Capillary Refill: Capillary refill takes less than 2 seconds. Coloration: Skin is not jaundiced. Neurological:      General: No focal deficit present. Mental Status: She is alert and oriented to person, place, and time.  Mental status is at baseline. Cranial Nerves: No cranial nerve deficit. Sensory: No sensory deficit. Motor: No weakness. Coordination: Coordination normal.      Comments: Patient is alert and oriented, no facial droop, no slurred speech, no drift upper or lower extremities. Exam as above  Psychiatric:         Mood and Affect: Mood normal.   Neurologic Exam: Cranial nerves pupils equal round to light extra muscles are full V1 through V3 intact face is symmetric. Palate elevates was good bilaterally tongue is midline. Good shoulder shrug present bilaterally. Motor examination does show relatively preserved strength 5/5 MRC grade in uppers and lowers proximally as well as distally. Sensory examinations intact to light touch all 4 extremities equally. Reflexes 2 at biceps triceps brachioradialis diminished patellar symmetric Achilles. Finger-to-nose as well as rapid altering movements slightly diminished on the right.     Gait not able to assess    Medications:  Reviewed    Infusion Medications:    sodium chloride 25 mL/hr at 06/15/21 0345    sodium chloride       Scheduled Medications:    metoprolol succinate  100 mg Oral Daily    fluticasone  1 puff Inhalation BID    sodium chloride flush  5-40 mL Intravenous 2 times per day    enoxaparin  30 mg Subcutaneous Daily    cefTRIAXone (ROCEPHIN) IV  1,000 mg Intravenous Q24H    atorvastatin  20 mg Oral Nightly    calcium elemental  500 mg Oral Daily    levothyroxine  75 mcg Oral Daily     PRN Meds: LORazepam, sodium chloride flush, sodium chloride, ondansetron **OR** ondansetron, polyethylene glycol, acetaminophen **OR** acetaminophen    Labs:   Recent Labs     06/12/21  1515 06/13/21  1223   WBC 6.4 4.8   HGB 11.8* 11.2*   HCT 33.7* 32.2*    170     Recent Labs     06/12/21  1515 06/13/21  1223    136   K 5.0* 4.1    104   CO2 22 18*   BUN 41* 27*   CREATININE 1.83* 1.22*   CALCIUM 9.4 9.6     Recent Labs     06/12/21  1515   AST 16   ALT 6 Board of Psychiatry & Neurology  Board Certified in Vascular Neurology  Board Certified in Neuromuscular Medicine  Certified in Neurorehabilitation       Collaborating physicians: Dr Berenice Guzman    Electronically signed by Julián Kat PA-C on 6/15/2021 at 10:40 AM

## 2021-06-15 NOTE — CONSULTS
[id number] [facility] Neurology progress note    Name: Lela Rubi  Age: 79 y.o. Gender: female  CodeStatus: Full Code  Allergies: Albuterol  Amlodipine  Metformin And Related    Chief Complaint:Loss of Consciousness and Foot Injury    Primary Care Provider: Jaime Whipple MD  InpatientTreatment Team: Treatment Team: Attending Provider: Kym Abbott DO; Consulting Physician: Yen Hamm MD; Consulting Physician: Finesse German DO; Utilization Reviewer: Duncan Galloway RN; Utilization Reviewer: Dina Araya RN; Registered Nurse: Merritt Wilson RN  Admission Date: 6/12/2021      HPI     Patient is a pleasant 71-year-old female was in her usual state of health until yesterday. Patient apparently had an episode in which she completely passed out she states when this happens she will feel lightheaded will feel as if the lights are going to go out and if she does not sit down she will completely pass out. The patient states she has had 3 of these episodes in total in the last week always occurring with standing or exerting oneself and or pain as her last one did occur after she dropped a drawer on her foot. Patient was concerned with this last episode that she was brought to the emergency department her CT scan was done of head did not reveal any evidence of any acute bleed mass lesions or structural lesions. In addition no early CT sign changes are seen to suggest any acute ischemia. Patient was started on telemetry admitted for further work-up including MRI    Pt seen and examined for neuro follow up. NO Headache, double vision, blurry vision, difficulty with speech, difficulty with swallowing, weakness, numbness, pain, nausea, vomiting, choking, neck pain, dizziness    I was called to see the patient for discharge and while I was seeing her she appeared to be doing well except she was receiving IV fluids and antibiotics and therefore I had not recommended a late discharge given that this was not already done.   History was noted she is not any dizzy spells and has not passed out. Allergies   Allergen Reactions    Albuterol     Amlodipine     Metformin And Related        Patient Active Problem List   Diagnosis    Hypothyroidism    Allergic rhinitis    Hypertension    Hyperlipidemia    Lichen planus    Hearing loss    Asthma    TIN (acute kidney injury) ([address]    Syncope    E. coli UTI       Past Medical History:   Diagnosis Date    Allergic rhinitis     Asthma     uses flovent    Hearing loss     Hyperlipidemia     Hypertension     Hypothyroidism     Lichen planus     Dr. Kim Randle       Family History   Problem Relation Age of Onset    High Blood Pressure Mother        Past Surgical History:   Procedure Laterality Date    HYSTERECTOMY, TOTAL ABDOMINAL      TONSILLECTOMY          Social History     Socioeconomic History    Marital status: Single     Spouse name: None    Number of children: 2    Years of education: None    Highest education level: None   Occupational History    None   Tobacco Use    Smoking status: Never Smoker    Smokeless tobacco: Never Used   Substance and Sexual Activity    Alcohol use: No    Drug use: No    Sexual activity: Not Currently   Other Topics Concern    None   Social History Narrative    Has 2 sons. One of her sons lives with her in a mobile home. Social Determinants of Health     Financial Resource Strain:     Difficulty of Paying Living Expenses:    Food Insecurity:     Worried About Running Out of Food in the Last Year:     [address] in the Last Year:    Transportation Needs:     Lack of Transportation (Medical):      Lack of Transportation (Non-Medical):    Physical Activity:     Days of Exercise per Week:     Minutes of Exercise per Session:    Stress:     Feeling of Stress :    Social Connections:     Frequency of Communication with Friends and Family:     Frequency of Social Gatherings with Friends and Family:     Attends [employer]: No deformity. Cervical back: Normal range of motion and neck supple. No rigidity. Comments: Patient has mild erythema noted across dorsal surface of right foot, first second third toes, no deformity, no lacerations, no ecchymosis. No deformity or crepitus. Skin:     General: Skin is warm and dry. Capillary Refill: Capillary refill takes less than 2 seconds. Coloration: Skin is not jaundiced. Neurological:      General: No focal deficit present. Mental Status: She is alert and oriented to person, place, and time. Mental status is at baseline. Cranial Nerves: No cranial nerve deficit. Sensory: No sensory deficit. Motor: No weakness. Coordination: Coordination normal.      Comments: Patient is alert and oriented, no facial droop, no slurred speech, no drift upper or lower extremities. Exam as above  Psychiatric:         Mood and Affect: Mood normal.   Neurologic Exam: Cranial nerves pupils equal round to light extra muscles are full V1 through V3 intact face is symmetric. Palate elevates was good bilaterally tongue is midline. Good shoulder shrug present bilaterally. Motor examination does show relatively preserved strength 5/5 MRC grade in uppers and lowers proximally as well as distally. Sensory examinations intact to light touch all 4 extremities equally. Reflexes 2 at biceps triceps brachioradialis diminished patellar symmetric Achilles. Finger-to-nose as well as rapid altering movements slightly diminished on the right.     Gait not able to assess    Medications:  Reviewed    Infusion Medications:    sodium chloride 25 mL/hr at 06/15/21 0345    sodium chloride       Scheduled Medications:    metoprolol succinate  100 mg Oral Daily    fluticasone  1 puff Inhalation BID    sodium chloride flush  5-40 mL Intravenous 2 times per day    enoxaparin  30 mg Subcutaneous Daily    cefTRIAXone (ROCEPHIN) IV  1,000 mg Intravenous Q24H    atorvastatin 20 mg Oral Nightly    calcium elemental  500 mg Oral Daily    levothyroxine  75 mcg Oral Daily     PRN Meds: sodium chloride flush, sodium chloride, ondansetron **OR** ondansetron, polyethylene glycol, acetaminophen **OR** acetaminophen    Labs:   Recent Labs     06/12/21  1515 06/13/21  1223   WBC 6.4 4.8   HGB 11.8* 11.2*   HCT 33.7* 32.2*    170     Recent Labs     06/12/21  1515 06/13/21  1223    136   K 5.0* 4.1    104   CO2 22 18*   BUN 41* 27*   CREATININE 1.83* 1.22*   CALCIUM 9.4 9.6     Recent Labs     06/12/21  1515   AST 16   ALT 6   BILITOT 0.8*   ALKPHOS 57     No results for input(s): INR in the last 72 hours. Recent Labs     06/12/21  1515   CKTOTAL 40   TROPONINI <0.010       Urinalysis:   Lab Results   Component Value Date    NITRU POSITIVE 06/12/2021    WBCUA >100 06/12/2021    BACTERIA MANY 06/12/2021    RBCUA 3-5 06/12/2021    BLOODU TRACE 06/12/2021    SPECGRAV 1.017 06/12/2021    GLUCOSEU Negative 06/12/2021       Radiology:   Most recent    EEG No valid procedures specified. MRI of Brain No results found for this or any previous visit. No results found for this or any previous visit. MRA of the Head and Neck: No results found for this or any previous visit. No results found for this or any previous visit. No results found for this or any previous visit. CT of the Head: Results for orders placed during the hospital encounter of 06/12/21    CT Head WO Contrast    Narrative  CT HEAD WO CONTRAST    CLINICAL HISTORY:  syncope    COMPARISON: September 7, 20141    TECHNIQUE: Multiple unenhanced serial axial images of the brain from the vertex of the skull to the base of the skull were performed. FINDINGS: The ventricles are dilated. Unchanged size configuration. .  No mass. No midline shift. The cisterns are patent.  There are white matter and periventricular changes most likely consistent with chronic small vessel disease. No acute intra-axial or extra-axial findings. The visualized osseous structures are unremarkable. The visualized portion of the paranasal sinuses, and mastoids are unremarkable. Impression  NO ACUTE INTRA-AXIAL OR EXTRA-AXIAL FINDINGS. All CT scans at this facility use dose modulation, iterative reconstruction, and/or weight based dosing when appropriate to reduce radiation dose to as low as reasonably achievable. No results found for this or any previous visit. No results found for this or any previous visit. Carotid duplex: No results found for this or any previous visit. No results found for this or any previous visit. No results found for this or any previous visit. Echo No results found for this or any previous visit. Assessment/Plan: 78-year-old female with recurrent episodes of syncope where she will have even presyncopal type symptoms involving lightheadedness and a feeling as if she is going to pass out if she wants it down. The patient has had 3 similar type episodes in the last week some of which have been associated with position changes in 1 of which is associated with painful stimuli. Whether or not these are orthostatic episodes or vasovagal type response or autonomic instability is unclear. We will exclude underlying neurological causes and thus we will make sure we have an MRI of the brain with and without contrast to exclude mass lesion or structural lesion we will also set up an EEG tomorrow to make sure there is no underlying epileptiform activity which also could provoke above episodes. We will follow her closely clinically and dictation     Noted as above patient seen by Dr. Samson Cortez and an MRI of the brain was never ordered. I will arrange for the same and then patient may be discharged if the MRI shows no abnormal findings. Armani Mello MD, Suze Diop, American Board of Psychiatry & Neurology  Board Certified in Vascular Neurology  Board Certified in Neuromuscular Medicine  Certified in Neurorehabilitation           Collaborating physicians: Dr Monserrat Lantigua    Electronically signed by Gary Love MD on 6/15/2021 at 8:41 AM

## 2021-06-15 NOTE — PROGRESS NOTES
Physician Progress Note    6/15/2021   11:04 AM    Name:  Rosa Hernandez  MRN:    [id number]     IP Day: 3     Admit Date: 2021  2:53 PM  PCP: Bob Morejon MD    Code Status:  Full Code      Assessment and Plan: Active Problems/ diagnosis:     Generalized weakness  Syncope-likely due to dehydration and generalized weakness in the setting of infection. Does not seem to be neurogenic. UTI present on admission  TIN  Hearing loss-very hard of hearing  Hypertension  Hyperlipidemia      Plan  Resume IV Rocephin, grown E. coli in the urine  Resume IV hydration  Creatinine much better today. Monitor. Hold nephrotoxic meds  Home medication resumed  Neurology and cardiology consulted for syncope. Follow-up echocardiogram  Regular diet  Recheck renal function. 6/15/2021 update  Patient is doing well this morning  Neurology seen the patient order an MRI  Patient is medically stable for discharge once okay by neurologist.  Discussed plan of care with patient. She will follow-up as outpatient. DVT PPx     Subjective:     Feels well today.     Physical Examination:      Vitals:  /62   Pulse 79   Temp 97.5 °F (36.4 °C)   Resp 16   Ht 5' 4\" (1.626 m)   Wt 125 lb (56.7 kg)   SpO2 100%   BMI 21.46 kg/m²   Temp (24hrs), Av.6 °F (36.4 °C), Min:97.5 °F (36.4 °C), Max:97.8 °F (36.6 °C)      General appearance: alert, cooperative and no distress  Mental Status: oriented to person, place and time and normal affect  Lungs: clear to auscultation bilaterally, normal effort  Heart: regular rate and rhythm  Abdomen: soft, nontender, nondistended, bowel sounds present, no masses  Extremities: no edema, redness, tenderness in the calves  Skin: no gross lesions, rashes  Neurological-nonfocal, very hard of hearing, moving all extremities, sensation intact throughout, strength intact throughout symmetric with good muscle bulk and tone 5/5 throughout    Data:     Labs:  Recent Labs     21  8105 Stopped at 06/15/21 0342    atorvastatin (LIPITOR) tablet 20 mg  20 mg Oral Nightly Zoraida Dempsey, APRN - NP   20 mg at 06/14/21 2217    calcium elemental (OSCAL) tablet 500 mg  500 mg Oral Daily Zoraida Dempsey, APRN - NP   500 mg at 06/15/21 0953    levothyroxine (SYNTHROID) tablet 75 mcg  75 mcg Oral Daily Zoraida Dempsey, APRN - NP   75 mcg at 06/15/21 1116       Additional work up or/and treatment plan may be added today or then after based on clinical progression. I am managing a portion of pt care. Some medical issues are handled by other specialists. Additional work up and treatment should be done in out pt setting by pt PCP and other out pt providers. In addition to examining and evaluating pt, I spent additional time explaining care, normaland abnormal findings, and treatment plan. All of pt questions were answered. Counseling, diet and education were provided. Case will be discussed with nursing staff when appropriate. Family will be updated if and when appropriate.        Electronically signed by Zahida Mclain DO on 6/15/2021 at 11:04 AM

## 2021-06-15 NOTE — PROGRESS NOTES
Pt shift assessment completed. Pt denies pain. Vitals are stable. Pt [place]. Pt has no requests at this time. Waiting on Neuro to clear for discharge. Fall precautions in place. Will continue to monitor.      Electronically signed by Santa Hartley RN on 6/15/2021 at 1:07 AM

## 2021-11-21 ENCOUNTER — APPOINTMENT (OUTPATIENT)
Dept: CT IMAGING | Age: 81
DRG: 689 | End: 2021-11-21
Payer: MEDICARE

## 2021-11-21 ENCOUNTER — HOSPITAL ENCOUNTER (INPATIENT)
Age: 81
LOS: 1 days | Discharge: ANOTHER HOSPITAL | DRG: 689 | End: 2021-11-22
Attending: INTERNAL MEDICINE | Admitting: INTERNAL MEDICINE
Payer: MEDICARE

## 2021-11-21 DIAGNOSIS — N30.00 ACUTE CYSTITIS WITHOUT HEMATURIA: Primary | ICD-10-CM

## 2021-11-21 DIAGNOSIS — R41.82 ALTERED MENTAL STATUS, UNSPECIFIED ALTERED MENTAL STATUS TYPE: ICD-10-CM

## 2021-11-21 LAB
ALBUMIN SERPL-MCNC: 3.6 G/DL (ref 3.5–4.6)
ALP BLD-CCNC: 54 U/L (ref 40–130)
ALT SERPL-CCNC: 21 U/L (ref 0–33)
AMPHETAMINE SCREEN, URINE: NORMAL
ANION GAP SERPL CALCULATED.3IONS-SCNC: 10 MEQ/L (ref 9–15)
AST SERPL-CCNC: 37 U/L (ref 0–35)
BACTERIA: ABNORMAL /HPF
BARBITURATE SCREEN URINE: NORMAL
BASOPHILS ABSOLUTE: 0.1 K/UL (ref 0–0.2)
BASOPHILS RELATIVE PERCENT: 0.6 %
BENZODIAZEPINE SCREEN, URINE: NORMAL
BILIRUB SERPL-MCNC: 0.4 MG/DL (ref 0.2–0.7)
BILIRUBIN URINE: NEGATIVE
BLOOD, URINE: NEGATIVE
BUN BLDV-MCNC: 12 MG/DL (ref 8–23)
C-REACTIVE PROTEIN: 1.1 MG/L (ref 0–5)
CALCIUM SERPL-MCNC: 8.3 MG/DL (ref 8.5–9.9)
CANNABINOID SCREEN URINE: NORMAL
CHLORIDE BLD-SCNC: 102 MEQ/L (ref 95–107)
CLARITY: CLEAR
CO2: 25 MEQ/L (ref 20–31)
COCAINE METABOLITE SCREEN URINE: NORMAL
COLOR: YELLOW
CREAT SERPL-MCNC: 0.93 MG/DL (ref 0.5–0.9)
EOSINOPHILS ABSOLUTE: 0.1 K/UL (ref 0–0.7)
EOSINOPHILS RELATIVE PERCENT: 0.7 %
EPITHELIAL CELLS, UA: ABNORMAL /HPF (ref 0–5)
ETHANOL PERCENT: NORMAL G/DL
ETHANOL: <10 MG/DL (ref 0–0.08)
GFR AFRICAN AMERICAN: >60
GFR NON-AFRICAN AMERICAN: 58
GLOBULIN: 3.2 G/DL (ref 2.3–3.5)
GLUCOSE BLD-MCNC: 112 MG/DL (ref 70–99)
GLUCOSE URINE: NEGATIVE MG/DL
HCT VFR BLD CALC: 40 % (ref 37–47)
HEMOGLOBIN: 13.5 G/DL (ref 12–16)
HYALINE CASTS: ABNORMAL /HPF (ref 0–5)
KETONES, URINE: ABNORMAL MG/DL
LEUKOCYTE ESTERASE, URINE: ABNORMAL
LYMPHOCYTES ABSOLUTE: 1.2 K/UL (ref 1–4.8)
LYMPHOCYTES RELATIVE PERCENT: 12.3 %
Lab: NORMAL
MAGNESIUM: 1.6 MG/DL (ref 1.7–2.4)
MCH RBC QN AUTO: 31.9 PG (ref 27–31.3)
MCHC RBC AUTO-ENTMCNC: 33.7 % (ref 33–37)
MCV RBC AUTO: 94.6 FL (ref 82–100)
METHADONE SCREEN, URINE: NORMAL
MONOCYTES ABSOLUTE: 0.8 K/UL (ref 0.2–0.8)
MONOCYTES RELATIVE PERCENT: 8.5 %
NEUTROPHILS ABSOLUTE: 7.3 K/UL (ref 1.4–6.5)
NEUTROPHILS RELATIVE PERCENT: 77.9 %
NITRITE, URINE: POSITIVE
OPIATE SCREEN URINE: NORMAL
OXYCODONE URINE: NORMAL
PDW BLD-RTO: 13.2 % (ref 11.5–14.5)
PH UA: 5.5 (ref 5–9)
PHENCYCLIDINE SCREEN URINE: NORMAL
PLATELET # BLD: 298 K/UL (ref 130–400)
POTASSIUM SERPL-SCNC: 4.4 MEQ/L (ref 3.4–4.9)
PRO-BNP: 608 PG/ML
PROPOXYPHENE SCREEN: NORMAL
PROTEIN UA: NEGATIVE MG/DL
RBC # BLD: 4.23 M/UL (ref 4.2–5.4)
RBC UA: ABNORMAL /HPF (ref 0–5)
SODIUM BLD-SCNC: 137 MEQ/L (ref 135–144)
SPECIFIC GRAVITY UA: 1.01 (ref 1–1.03)
TOTAL PROTEIN: 6.8 G/DL (ref 6.3–8)
TROPONIN: <0.01 NG/ML (ref 0–0.01)
URINE REFLEX TO CULTURE: YES
UROBILINOGEN, URINE: 0.2 E.U./DL
WBC # BLD: 9.4 K/UL (ref 4.8–10.8)
WBC UA: ABNORMAL /HPF (ref 0–5)

## 2021-11-21 PROCEDURE — 87086 URINE CULTURE/COLONY COUNT: CPT

## 2021-11-21 PROCEDURE — 84484 ASSAY OF TROPONIN QUANT: CPT

## 2021-11-21 PROCEDURE — 86140 C-REACTIVE PROTEIN: CPT

## 2021-11-21 PROCEDURE — 96375 TX/PRO/DX INJ NEW DRUG ADDON: CPT

## 2021-11-21 PROCEDURE — 70450 CT HEAD/BRAIN W/O DYE: CPT

## 2021-11-21 PROCEDURE — 87077 CULTURE AEROBIC IDENTIFY: CPT

## 2021-11-21 PROCEDURE — 93005 ELECTROCARDIOGRAM TRACING: CPT | Performed by: INTERNAL MEDICINE

## 2021-11-21 PROCEDURE — 85025 COMPLETE CBC W/AUTO DIFF WBC: CPT

## 2021-11-21 PROCEDURE — 80307 DRUG TEST PRSMV CHEM ANLYZR: CPT

## 2021-11-21 PROCEDURE — 80053 COMPREHEN METABOLIC PANEL: CPT

## 2021-11-21 PROCEDURE — G0378 HOSPITAL OBSERVATION PER HR: HCPCS

## 2021-11-21 PROCEDURE — 82077 ASSAY SPEC XCP UR&BREATH IA: CPT

## 2021-11-21 PROCEDURE — 2580000003 HC RX 258: Performed by: PHYSICIAN ASSISTANT

## 2021-11-21 PROCEDURE — 87186 SC STD MICRODIL/AGAR DIL: CPT

## 2021-11-21 PROCEDURE — 99284 EMERGENCY DEPT VISIT MOD MDM: CPT

## 2021-11-21 PROCEDURE — 83735 ASSAY OF MAGNESIUM: CPT

## 2021-11-21 PROCEDURE — 83880 ASSAY OF NATRIURETIC PEPTIDE: CPT

## 2021-11-21 PROCEDURE — 81001 URINALYSIS AUTO W/SCOPE: CPT

## 2021-11-21 PROCEDURE — 1210000000 HC MED SURG R&B

## 2021-11-21 PROCEDURE — 6360000002 HC RX W HCPCS: Performed by: PHYSICIAN ASSISTANT

## 2021-11-21 PROCEDURE — 96365 THER/PROPH/DIAG IV INF INIT: CPT

## 2021-11-21 PROCEDURE — 36415 COLL VENOUS BLD VENIPUNCTURE: CPT

## 2021-11-21 RX ORDER — HYDRALAZINE HYDROCHLORIDE 20 MG/ML
20 INJECTION INTRAMUSCULAR; INTRAVENOUS ONCE
Status: COMPLETED | OUTPATIENT
Start: 2021-11-21 | End: 2021-11-21

## 2021-11-21 RX ADMIN — HYDRALAZINE HYDROCHLORIDE 10 MG: 20 INJECTION INTRAMUSCULAR; INTRAVENOUS at 22:05

## 2021-11-21 RX ADMIN — CEFTRIAXONE SODIUM 1000 MG: 1 INJECTION, POWDER, FOR SOLUTION INTRAMUSCULAR; INTRAVENOUS at 22:11

## 2021-11-21 ASSESSMENT — ENCOUNTER SYMPTOMS
VOICE CHANGE: 0
SHORTNESS OF BREATH: 0
ANAL BLEEDING: 0
ABDOMINAL DISTENTION: 0
EYE DISCHARGE: 0
COUGH: 0
APNEA: 0
PHOTOPHOBIA: 0

## 2021-11-21 NOTE — Clinical Note
Patient Class: Inpatient [101]   REQUIRED: Diagnosis: AMS (altered mental status) [[id number]]   Estimated Length of Stay: Estimated stay of more than 2 midnights   Admitting Provider: Jo Yoon [[id number]]   Telemetry/Cardiac Monitoring Required?: Yes

## 2021-11-22 ENCOUNTER — HOSPITAL ENCOUNTER (INPATIENT)
Age: 81
LOS: 3 days | Discharge: HOME | DRG: 065 | End: 2021-11-25
Attending: INTERNAL MEDICINE | Admitting: INTERNAL MEDICINE
Payer: MEDICARE

## 2021-11-22 ENCOUNTER — APPOINTMENT (OUTPATIENT)
Dept: MRI IMAGING | Age: 81
DRG: 689 | End: 2021-11-22
Payer: MEDICARE

## 2021-11-22 VITALS
HEART RATE: 78 BPM | HEIGHT: 64 IN | DIASTOLIC BLOOD PRESSURE: 70 MMHG | RESPIRATION RATE: 17 BRPM | OXYGEN SATURATION: 98 % | SYSTOLIC BLOOD PRESSURE: 142 MMHG | TEMPERATURE: 97.6 F | WEIGHT: 120 LBS | BODY MASS INDEX: 20.49 KG/M2

## 2021-11-22 PROBLEM — I63.9 ACUTE CEREBROVASCULAR ACCIDENT (CVA) (HCC): Status: ACTIVE | Noted: 2021-11-22

## 2021-11-22 LAB
ALBUMIN SERPL-MCNC: 2.6 G/DL (ref 3.5–4.6)
ALP BLD-CCNC: 44 U/L (ref 40–130)
ALT SERPL-CCNC: 14 U/L (ref 0–33)
ANION GAP SERPL CALCULATED.3IONS-SCNC: 7 MEQ/L (ref 9–15)
AST SERPL-CCNC: 24 U/L (ref 0–35)
BASOPHILS ABSOLUTE: 0 K/UL (ref 0–0.2)
BASOPHILS RELATIVE PERCENT: 0.5 %
BILIRUB SERPL-MCNC: 0.3 MG/DL (ref 0.2–0.7)
BUN BLDV-MCNC: 9 MG/DL (ref 8–23)
CALCIUM SERPL-MCNC: 7.8 MG/DL (ref 8.5–9.9)
CHLORIDE BLD-SCNC: 109 MEQ/L (ref 95–107)
CO2: 25 MEQ/L (ref 20–31)
CREAT SERPL-MCNC: 0.89 MG/DL (ref 0.5–0.9)
EOSINOPHILS ABSOLUTE: 0 K/UL (ref 0–0.7)
EOSINOPHILS RELATIVE PERCENT: 0.9 %
GFR AFRICAN AMERICAN: >60
GFR NON-AFRICAN AMERICAN: >60
GLOBULIN: 2.6 G/DL (ref 2.3–3.5)
GLUCOSE BLD-MCNC: 116 MG/DL (ref 70–99)
HCT VFR BLD CALC: 33.8 % (ref 37–47)
HEMOGLOBIN: 11.7 G/DL (ref 12–16)
LYMPHOCYTES ABSOLUTE: 0.8 K/UL (ref 1–4.8)
LYMPHOCYTES RELATIVE PERCENT: 14.4 %
MAGNESIUM: 1.4 MG/DL (ref 1.7–2.4)
MCH RBC QN AUTO: 32.1 PG (ref 27–31.3)
MCHC RBC AUTO-ENTMCNC: 34.6 % (ref 33–37)
MCV RBC AUTO: 92.7 FL (ref 82–100)
MONOCYTES ABSOLUTE: 0.6 K/UL (ref 0.2–0.8)
MONOCYTES RELATIVE PERCENT: 10.2 %
NEUTROPHILS ABSOLUTE: 4.1 K/UL (ref 1.4–6.5)
NEUTROPHILS RELATIVE PERCENT: 74 %
PDW BLD-RTO: 13.5 % (ref 11.5–14.5)
PLATELET # BLD: 238 K/UL (ref 130–400)
POTASSIUM SERPL-SCNC: 3.3 MEQ/L (ref 3.4–4.9)
RBC # BLD: 3.64 M/UL (ref 4.2–5.4)
SARS-COV-2, NAAT: NOT DETECTED
SODIUM BLD-SCNC: 141 MEQ/L (ref 135–144)
TOTAL PROTEIN: 5.2 G/DL (ref 6.3–8)
WBC # BLD: 5.5 K/UL (ref 4.8–10.8)

## 2021-11-22 PROCEDURE — 1210000000 HC MED SURG R&B

## 2021-11-22 PROCEDURE — 87635 SARS-COV-2 COVID-19 AMP PRB: CPT

## 2021-11-22 PROCEDURE — 6360000002 HC RX W HCPCS: Performed by: INTERNAL MEDICINE

## 2021-11-22 PROCEDURE — 6370000000 HC RX 637 (ALT 250 FOR IP): Performed by: INTERNAL MEDICINE

## 2021-11-22 PROCEDURE — 94640 AIRWAY INHALATION TREATMENT: CPT

## 2021-11-22 PROCEDURE — 2580000003 HC RX 258: Performed by: INTERNAL MEDICINE

## 2021-11-22 PROCEDURE — 96372 THER/PROPH/DIAG INJ SC/IM: CPT

## 2021-11-22 PROCEDURE — G0378 HOSPITAL OBSERVATION PER HR: HCPCS

## 2021-11-22 PROCEDURE — 36415 COLL VENOUS BLD VENIPUNCTURE: CPT

## 2021-11-22 PROCEDURE — 83735 ASSAY OF MAGNESIUM: CPT

## 2021-11-22 PROCEDURE — 85025 COMPLETE CBC W/AUTO DIFF WBC: CPT

## 2021-11-22 PROCEDURE — 70551 MRI BRAIN STEM W/O DYE: CPT

## 2021-11-22 PROCEDURE — 80053 COMPREHEN METABOLIC PANEL: CPT

## 2021-11-22 RX ORDER — HYDRALAZINE HYDROCHLORIDE 20 MG/ML
10 INJECTION INTRAMUSCULAR; INTRAVENOUS EVERY 4 HOURS PRN
Status: DISCONTINUED | OUTPATIENT
Start: 2021-11-22 | End: 2021-11-22 | Stop reason: HOSPADM

## 2021-11-22 RX ORDER — ACETAMINOPHEN 325 MG/1
650 TABLET ORAL EVERY 6 HOURS PRN
Status: DISCONTINUED | OUTPATIENT
Start: 2021-11-22 | End: 2021-11-22 | Stop reason: HOSPADM

## 2021-11-22 RX ORDER — ONDANSETRON 2 MG/ML
4 INJECTION INTRAMUSCULAR; INTRAVENOUS EVERY 6 HOURS PRN
Status: DISCONTINUED | OUTPATIENT
Start: 2021-11-22 | End: 2021-11-22 | Stop reason: HOSPADM

## 2021-11-22 RX ORDER — FLUTICASONE PROPIONATE 44 UG/1
2 AEROSOL, METERED RESPIRATORY (INHALATION) 2 TIMES DAILY
Status: DISCONTINUED | OUTPATIENT
Start: 2021-11-22 | End: 2021-11-22 | Stop reason: CLARIF

## 2021-11-22 RX ORDER — ONDANSETRON 4 MG/1
4 TABLET, ORALLY DISINTEGRATING ORAL EVERY 8 HOURS PRN
Status: DISCONTINUED | OUTPATIENT
Start: 2021-11-22 | End: 2021-11-25 | Stop reason: HOSPADM

## 2021-11-22 RX ORDER — ONDANSETRON 2 MG/ML
4 INJECTION INTRAMUSCULAR; INTRAVENOUS EVERY 6 HOURS PRN
Status: DISCONTINUED | OUTPATIENT
Start: 2021-11-22 | End: 2021-11-25 | Stop reason: HOSPADM

## 2021-11-22 RX ORDER — ACETAMINOPHEN 325 MG/1
650 TABLET ORAL EVERY 6 HOURS PRN
Status: DISCONTINUED | OUTPATIENT
Start: 2021-11-22 | End: 2021-11-25 | Stop reason: HOSPADM

## 2021-11-22 RX ORDER — LEVOTHYROXINE SODIUM 0.07 MG/1
75 TABLET ORAL DAILY
Status: DISCONTINUED | OUTPATIENT
Start: 2021-11-22 | End: 2021-11-25 | Stop reason: HOSPADM

## 2021-11-22 RX ORDER — LISINOPRIL 20 MG/1
20 TABLET ORAL DAILY
Status: DISCONTINUED | OUTPATIENT
Start: 2021-11-22 | End: 2021-11-23

## 2021-11-22 RX ORDER — SODIUM CHLORIDE 9 MG/ML
25 INJECTION, SOLUTION INTRAVENOUS PRN
Status: DISCONTINUED | OUTPATIENT
Start: 2021-11-22 | End: 2021-11-22 | Stop reason: HOSPADM

## 2021-11-22 RX ORDER — ACETAMINOPHEN 650 MG/1
650 SUPPOSITORY RECTAL EVERY 6 HOURS PRN
Status: DISCONTINUED | OUTPATIENT
Start: 2021-11-22 | End: 2021-11-25 | Stop reason: HOSPADM

## 2021-11-22 RX ORDER — BUDESONIDE 0.5 MG/2ML
250 INHALANT ORAL 2 TIMES DAILY
Status: DISCONTINUED | OUTPATIENT
Start: 2021-11-22 | End: 2021-11-22

## 2021-11-22 RX ORDER — SODIUM CHLORIDE 0.9 % (FLUSH) 0.9 %
5-40 SYRINGE (ML) INJECTION EVERY 12 HOURS SCHEDULED
Status: DISCONTINUED | OUTPATIENT
Start: 2021-11-22 | End: 2021-11-22 | Stop reason: HOSPADM

## 2021-11-22 RX ORDER — SODIUM CHLORIDE 9 MG/ML
INJECTION, SOLUTION INTRAVENOUS CONTINUOUS
Status: DISCONTINUED | OUTPATIENT
Start: 2021-11-22 | End: 2021-11-22

## 2021-11-22 RX ORDER — POTASSIUM CHLORIDE 750 MG/1
20 TABLET, EXTENDED RELEASE ORAL ONCE
Status: COMPLETED | OUTPATIENT
Start: 2021-11-22 | End: 2021-11-22

## 2021-11-22 RX ORDER — SODIUM CHLORIDE 0.9 % (FLUSH) 0.9 %
5-40 SYRINGE (ML) INJECTION EVERY 12 HOURS SCHEDULED
Status: DISCONTINUED | OUTPATIENT
Start: 2021-11-22 | End: 2021-11-25 | Stop reason: HOSPADM

## 2021-11-22 RX ORDER — ACETAMINOPHEN 650 MG/1
650 SUPPOSITORY RECTAL EVERY 6 HOURS PRN
Status: DISCONTINUED | OUTPATIENT
Start: 2021-11-22 | End: 2021-11-22 | Stop reason: HOSPADM

## 2021-11-22 RX ORDER — METOPROLOL SUCCINATE 25 MG/1
25 TABLET, EXTENDED RELEASE ORAL DAILY
Status: DISCONTINUED | OUTPATIENT
Start: 2021-11-22 | End: 2021-11-25 | Stop reason: HOSPADM

## 2021-11-22 RX ORDER — ATORVASTATIN CALCIUM 10 MG/1
20 TABLET, FILM COATED ORAL NIGHTLY
Status: DISCONTINUED | OUTPATIENT
Start: 2021-11-22 | End: 2021-11-25 | Stop reason: HOSPADM

## 2021-11-22 RX ORDER — SODIUM CHLORIDE 0.9 % (FLUSH) 0.9 %
5-40 SYRINGE (ML) INJECTION PRN
Status: DISCONTINUED | OUTPATIENT
Start: 2021-11-22 | End: 2021-11-22 | Stop reason: HOSPADM

## 2021-11-22 RX ORDER — SODIUM CHLORIDE 9 MG/ML
25 INJECTION, SOLUTION INTRAVENOUS PRN
Status: DISCONTINUED | OUTPATIENT
Start: 2021-11-22 | End: 2021-11-25 | Stop reason: HOSPADM

## 2021-11-22 RX ORDER — ASPIRIN 81 MG/1
81 TABLET, CHEWABLE ORAL DAILY
Status: DISCONTINUED | OUTPATIENT
Start: 2021-11-23 | End: 2021-11-25 | Stop reason: HOSPADM

## 2021-11-22 RX ORDER — POLYETHYLENE GLYCOL 3350 17 G/17G
17 POWDER, FOR SOLUTION ORAL DAILY PRN
Status: DISCONTINUED | OUTPATIENT
Start: 2021-11-22 | End: 2021-11-25 | Stop reason: HOSPADM

## 2021-11-22 RX ORDER — HYDRALAZINE HYDROCHLORIDE 20 MG/ML
10 INJECTION INTRAMUSCULAR; INTRAVENOUS EVERY 4 HOURS PRN
Status: DISCONTINUED | OUTPATIENT
Start: 2021-11-22 | End: 2021-11-25 | Stop reason: HOSPADM

## 2021-11-22 RX ORDER — HYDRALAZINE HYDROCHLORIDE 100 MG/1
100 TABLET, FILM COATED ORAL 2 TIMES DAILY
Status: ON HOLD | COMMUNITY
End: 2022-02-14

## 2021-11-22 RX ORDER — ONDANSETRON 4 MG/1
4 TABLET, ORALLY DISINTEGRATING ORAL EVERY 8 HOURS PRN
Status: DISCONTINUED | OUTPATIENT
Start: 2021-11-22 | End: 2021-11-22 | Stop reason: HOSPADM

## 2021-11-22 RX ORDER — DEXTRAN 70/HYPROMELLOSE
DROPS OPHTHALMIC (EYE)
COMMUNITY
End: 2022-10-24 | Stop reason: ALTCHOICE

## 2021-11-22 RX ORDER — ASPIRIN 81 MG/1
81 TABLET, CHEWABLE ORAL DAILY
Status: DISCONTINUED | OUTPATIENT
Start: 2021-11-22 | End: 2021-11-22

## 2021-11-22 RX ORDER — METOPROLOL SUCCINATE 50 MG/1
100 TABLET, EXTENDED RELEASE ORAL DAILY
Status: DISCONTINUED | OUTPATIENT
Start: 2021-11-22 | End: 2021-11-22

## 2021-11-22 RX ORDER — BUDESONIDE 0.5 MG/2ML
250 INHALANT ORAL 2 TIMES DAILY
Status: DISCONTINUED | OUTPATIENT
Start: 2021-11-22 | End: 2021-11-25 | Stop reason: HOSPADM

## 2021-11-22 RX ORDER — POLYETHYLENE GLYCOL 3350 17 G/17G
17 POWDER, FOR SOLUTION ORAL DAILY PRN
Status: DISCONTINUED | OUTPATIENT
Start: 2021-11-22 | End: 2021-11-22 | Stop reason: HOSPADM

## 2021-11-22 RX ORDER — SODIUM CHLORIDE 0.9 % (FLUSH) 0.9 %
5-40 SYRINGE (ML) INJECTION PRN
Status: DISCONTINUED | OUTPATIENT
Start: 2021-11-22 | End: 2021-11-25 | Stop reason: HOSPADM

## 2021-11-22 RX ADMIN — ENOXAPARIN SODIUM 40 MG: 40 INJECTION SUBCUTANEOUS at 08:40

## 2021-11-22 RX ADMIN — BUDESONIDE 250 MCG: 0.5 SUSPENSION RESPIRATORY (INHALATION) at 18:24

## 2021-11-22 RX ADMIN — Medication 10 ML: at 08:41

## 2021-11-22 RX ADMIN — ASPIRIN 81 MG: 81 TABLET, CHEWABLE ORAL at 10:59

## 2021-11-22 RX ADMIN — POTASSIUM CHLORIDE 20 MEQ: 10 TABLET, EXTENDED RELEASE ORAL at 16:54

## 2021-11-22 RX ADMIN — CEFTRIAXONE 1000 MG: 1 INJECTION, POWDER, FOR SOLUTION INTRAMUSCULAR; INTRAVENOUS at 20:51

## 2021-11-22 RX ADMIN — SODIUM CHLORIDE: 9 INJECTION, SOLUTION INTRAVENOUS at 01:55

## 2021-11-22 RX ADMIN — Medication 10 ML: at 20:51

## 2021-11-22 RX ADMIN — MAGNESIUM OXIDE 400 MG: 400 TABLET ORAL at 20:52

## 2021-11-22 RX ADMIN — LISINOPRIL 20 MG: 20 TABLET ORAL at 16:54

## 2021-11-22 RX ADMIN — LEVOTHYROXINE SODIUM 75 MCG: 75 TABLET ORAL at 16:54

## 2021-11-22 RX ADMIN — ATORVASTATIN CALCIUM 20 MG: 10 TABLET, FILM COATED ORAL at 20:51

## 2021-11-22 RX ADMIN — METOPROLOL SUCCINATE 25 MG: 25 TABLET, FILM COATED, EXTENDED RELEASE ORAL at 16:54

## 2021-11-22 RX ADMIN — HYDRALAZINE HYDROCHLORIDE 10 MG: 20 INJECTION, SOLUTION INTRAMUSCULAR; INTRAVENOUS at 20:55

## 2021-11-22 NOTE — FLOWSHEET NOTE
Dr. Haroon Carty consult called in.  Electronically signed by Lionel Gage RN on 11/22/2021 at 4:14 PM

## 2021-11-22 NOTE — ED PROVIDER NOTES
[address] [facility] ED  eMERGENCY dEPARTMENT eNCOUnter      Pt Name: Milla Tavares  MRN: [id number]  [place] 1940  Date of evaluation: 11/21/2021  Provider: Nely Tinsley Dr       Chief Complaint   Patient presents with    Altered Mental Status    Fatigue         HISTORY OF PRESENT ILLNESS   (Location/Symptom, Timing/Onset,Context/Setting, Quality, Duration, Modifying Factors, Severity)  Note limiting factors. Milla Tavares is a 79 y.o. female who presents to the emergency department brought for acute mental status change confusion and slurred speech per family note that this started yesterday. Patient is hard of hearing she does have swelling of her life she is concerned that it might be the hydralazine that they have just increased. Patient denies headache weakness at this time she is cooperative for exam.  Symptoms moderate severity nothing improves symptoms nothing worsens    HPI    NursingNotes were reviewed. REVIEW OF SYSTEMS    (2-9 systems for level 4, 10 or more for level 5)     Review of Systems   Constitutional: Negative for activity change, appetite change, chills, fever and unexpected weight change. HENT: Negative for ear discharge, nosebleeds and voice change. Eyes: Negative for photophobia and discharge. Respiratory: Negative for apnea, cough and shortness of breath. Cardiovascular: Negative for chest pain. Gastrointestinal: Negative for abdominal distention and anal bleeding. Endocrine: Negative for cold intolerance, heat intolerance and polyphagia. Genitourinary: Negative for dysuria and genital sores. Musculoskeletal: Negative for joint swelling. Skin: Negative for pallor. Allergic/Immunologic: Negative for immunocompromised state. Neurological: Positive for speech difficulty and weakness. Negative for seizures, facial asymmetry and headaches. Hematological: Does not bruise/bleed easily.    Psychiatric/Behavioral: Positive for confusion. Negative for behavioral problems, self-injury and sleep disturbance. All other systems reviewed and are negative. Except as noted above the remainder of the review of systems was reviewed and negative. PAST MEDICAL HISTORY     Past Medical History:   Diagnosis Date    Allergic rhinitis     Asthma     uses flovent    Hearing loss     Hyperlipidemia     Hypertension     Hypothyroidism     Lichen planus     Dr. Flaco Garcia       Past Surgical History:   Procedure Laterality Date    HYSTERECTOMY, TOTAL ABDOMINAL      TONSILLECTOMY           CURRENT MEDICATIONS       Previous Medications    ATORVASTATIN (LIPITOR) 20 MG TABLET    Take 20 mg by mouth daily    FLOVENT HFA 44 MCG/ACT INHALER    INHALE 1 PUFF BY MOUTH TWICE DAILY FOR 90 DAYS. LEVOTHYROXINE (SYNTHROID) 75 MCG TABLET    Take 75 mcg by mouth Daily    LISINOPRIL (PRINIVIL;ZESTRIL) 20 MG TABLET    Take 1 tablet by mouth daily    METOPROLOL SUCCINATE (TOPROL XL) 100 MG EXTENDED RELEASE TABLET    Take 100 mg by mouth daily            Albuterol, Amlodipine, and Metformin and related    FAMILY HISTORY       Family History   Problem Relation Age of Onset    High Blood Pressure Mother           SOCIAL HISTORY       Social History     Socioeconomic History    Marital status: Single     Spouse name: None    Number of children: 2    Years of education: None    Highest education level: None   Occupational History    None   Tobacco Use    Smoking status: Never Smoker    Smokeless tobacco: Never Used   Substance and Sexual Activity    Alcohol use: No    Drug use: No    Sexual activity: Not Currently   Other Topics Concern    None   Social History Narrative    Has 2 sons. One of her sons lives with her in a mobile home.      Social Determinants of Health     Financial Resource Strain:     Difficulty of Paying Living Expenses: Not on file   Food Insecurity:     Worried About Running Out of Food in the Last Year: Not on file    Ran Out of Food in the Last Year: Not on file   Transportation Needs:     Lack of Transportation (Medical): Not on file    Lack of Transportation (Non-Medical): Not on file   Physical Activity:     Days of Exercise per Week: Not on file    Minutes of Exercise per Session: Not on file   Stress:     Feeling of Stress : Not on file   Social Connections:     Frequency of Communication with Friends and Family: Not on file    Frequency of Social Gatherings with Friends and Family: Not on file    Attends [employer] Services: Not on file    Active Member of [address] [place] [address] [employer] or Organizations: Not on file    Attends Club or Organization Meetings: Not on file    Marital Status: Not on file   Intimate Partner Violence:     Fear of Current or Ex-Partner: Not on file    Emotionally Abused: Not on file    Physically Abused: Not on file    Sexually Abused: Not on file   Housing Stability:     Unable to Pay for Housing in the Last Year: Not on file    Number of [place] in the Last Year: Not on file    Unstable Housing in the Last Year: Not on file       SCREENINGS   NIHSS Stroke Scale  Level of Consciousness (1a. ): Alert  LOC Questions (1b. ):  Answers both correctly  LOC Commands (1c. ): Performs both tasks correctly  Best Gaze (2. ): Normal  Visual (3. ): No visual loss  Facial Palsy (4. ): Normal symmetrical movement  Motor Arm, Left (5a. ): No drift  Motor Arm, Right (5b. ): No drift  Motor Leg, Left (6a. ): No drift  Motor Leg, Right (6b. ): No drift  Limb Ataxia (7. ): Absent  Sensory (8. ): Normal  Best Language (9. ): Mild to moderate aphasia  Dysarthria (10. ): Mild to moderate, slurs some words  Extinction and Inattention (11): No abnormality  Total: 2  [place] Coma Scale  Eye Opening: Spontaneous  Best Verbal Response: Oriented  Best Motor Response: Obeys commands  [place] Coma Scale Score: 15  Ebola Virus Disease (EVD) Screening   Temp: 97.6 °F (36.4 °C)  CIWA Assessment  BP: (!) 178/75  Pulse: 62    PHYSICAL EXAM    (up to 7 for level 4, 8 or more for level 5)     ED Triage Vitals [11/21/21 2040]   BP Temp Temp Source Pulse Resp SpO2 Height Weight   (!) 177/98 97.6 °F (36.4 °C) Oral 69 22 100 % -- 120 lb (54.4 kg)       Physical Exam  Vitals and nursing note reviewed. Constitutional:       General: She is not in acute distress. Appearance: She is well-developed. HENT:      Head: Normocephalic and atraumatic. Right Ear: External ear normal.      Left Ear: External ear normal.      Nose: Nose normal.      Mouth/Throat:      Mouth: Mucous membranes are moist.   Eyes:      General:         Right eye: No discharge. Left eye: No discharge. Pupils: Pupils are equal, round, and reactive to light. Cardiovascular:      Rate and Rhythm: Normal rate and regular rhythm. Heart sounds: Normal heart sounds. Pulmonary:      Effort: Pulmonary effort is normal. No respiratory distress. Breath sounds: Normal breath sounds. No stridor. Abdominal:      General: Bowel sounds are normal. There is no distension. Palpations: Abdomen is soft. Tenderness: There is no abdominal tenderness. Musculoskeletal:         General: Normal range of motion. Cervical back: Normal range of motion and neck supple. Skin:     General: Skin is warm. Findings: No erythema. Neurological:      Mental Status: She is alert and oriented to person, place, and time. Motor: No weakness. Coordination: Coordination normal.   Psychiatric:         Mood and Affect: Mood normal.         RESULTS     EKG: All EKG's are interpreted by the Emergency Department Physician who either signs or Co-signsthis chart in the absence of a cardiologist.    nsr 73bpm no acute st changes no ectopy left axis.      RADIOLOGY:   Non-plain filmimages such as CT, Ultrasound and MRI are read by the radiologist. Plain radiographic images are visualized and preliminarily interpreted by the emergency physician with the below findings:        Interpretation per the Radiologist below, if available at the time ofthis note:    CT Head WO Contrast   Final Result     No acute intracranial abnormality. ED BEDSIDE ULTRASOUND:   Performed by ED Physician - none    LABS:  Labs Reviewed   COMPREHENSIVE METABOLIC PANEL - Abnormal; Notable for the following components:       Result Value    Glucose 112 (*)     CREATININE 0.93 (*)     GFR Non- 58.0 (*)     Calcium 8.3 (*)     AST 37 (*)     All other components within normal limits   CBC WITH AUTO DIFFERENTIAL - Abnormal; Notable for the following components:    MCH 31.9 (*)     Neutrophils Absolute 7.3 (*)     All other components within normal limits   MAGNESIUM - Abnormal; Notable for the following components:    Magnesium 1.6 (*)     All other components within normal limits   URINE RT REFLEX TO CULTURE - Abnormal; Notable for the following components:    Ketones, Urine TRACE (*)     Nitrite, Urine POSITIVE (*)     Leukocyte Esterase, Urine LARGE (*)     All other components within normal limits   MICROSCOPIC URINALYSIS - Abnormal; Notable for the following components:    Bacteria, UA MANY (*)     WBC, UA  (*)     All other components within normal limits   CULTURE, URINE   URINE DRUG SCREEN   ETHANOL   TROPONIN   BRAIN NATRIURETIC PEPTIDE   C-REACTIVE PROTEIN       All other labs were within normal range or not returned as of this dictation.     EMERGENCY DEPARTMENT COURSE and DIFFERENTIAL DIAGNOSIS/MDM:   Vitals:    Vitals:    11/21/21 2040 11/21/21 2100 11/21/21 2153 11/21/21 2205   BP: (!) 177/98 (!) 176/71 (!) 200/72 (!) 178/75   Pulse: 69 63 62    Resp: 22 20 17    Temp: 97.6 °F (36.4 °C)      TempSrc: Oral      SpO2: 100% 99% 99%    Weight: 120 lb (54.4 kg)              MDM  Number of Diagnoses or Management Options  Diagnosis management comments: Patient hard of hearing utilize nursing and written pad and paper to ask questions will sign out to [facility] for continued care evaluation and appropriate disposition. Amount and/or Complexity of Data Reviewed  Clinical lab tests: ordered  Tests in the radiology section of CPT®: ordered      Pt presents with change in mentation per her son. Son is not here so report from ems and nursing staff, pt has just been not acting her normal self. Son last spoke to pt yesterday. Pt does have h/o of severe uti with confusion in the past, in June. Ct brain negative. No facial droop, equal strength of upper and lower extremities, ambultes to toilet. Pt speech possibly altered? Unsure at this point as she is deaf. ua remarkable for large leuks, bacteria and positive nitrite, treated with rocephin in the ED. Pt will be treated for uti with confusion and admitted for further tx and management. Pt stable and ready for admission. CRITICAL CARE TIME   Total Critical Care time was  minutes, excluding separately reportableprocedures. There was a high probability of clinicallysignificant/life threatening deterioration in the patient's condition which required my urgent intervention. CONSULTS:  None    PROCEDURES:  Unless otherwise noted below, none     Procedures    FINAL IMPRESSION      1. Acute cystitis without hematuria    2. Altered mental status, unspecified altered mental status type          DISPOSITION/PLAN   DISPOSITION        PATIENT REFERRED TO:  No follow-up provider specified.     DISCHARGE MEDICATIONS:  New Prescriptions    No medications on file          (Please note that portions of this note were completed with a voice recognition program.  Efforts were made to edit the dictations but occasionally words are mis-transcribed.)    Regan Diaz PA-C (electronically signed)  Attending Emergency Physician       Regan Diaz PA-C  11/21/21 Arsalan Osei PA-C  11/21/21 2474

## 2021-11-22 NOTE — PROGRESS NOTES
[facility]   Facility/Department: [address] [facility] ED  Speech Language Pathology    Avis Fontana  1940 22/22    Date: 11/22/2021      Speech Therapy attempted to see Avis Fontana on this date for a/an:    Clinical Bedside Swallow Evaluation and Speech Language Evaluation    Pt was unable to be seen due to:   Patient is currently off unit: MRI        Electronically signed by BETO Call on 11/22/21 at 12:46 PM EST

## 2021-11-22 NOTE — ED NOTES
Pt was able to get up and use beside commode with minimal assistance. Pt has hearing issues and does need to read lips, pt will respond slowly.       Faina Linton RN  11/22/21 1591

## 2021-11-22 NOTE — ED NOTES
Bed: 30  Expected date: 11/21/21  Expected time: 8:15 PM  Means of arrival:   Comments:   Altered mental status 80 y/o M hx UTI      Tyna Homans, RN  11/21/21 2029

## 2021-11-22 NOTE — ED NOTES
PT AMBULATED X1 WITH THIS RN TO TOILET NEXT TO BED. PT GAIT SLOW AND STEADY. PT RETURNED BACK IN BED, BLANKETS PLACED, CALL LIGHT WITHIN REACH. NO FURTHER NEEDS VOICED AT THIS TIME. BP ELEVATED, REPORTED TO STIVEN GODDARD.      Corin Wagner RN  11/21/21 0519

## 2021-11-22 NOTE — PROGRESS NOTES
PHARMACY NOTE:    Fluticasone (Flovent HFA) 44 mcg inhaler 2 puffs BID changed to budesonide neb (Pulmicort Respules) 0.5 mg/2 mL BID per therapeutic interchange.     Alice Appiah, PharmD   11/22/2021 3:53 PM

## 2021-11-22 NOTE — H&P
Hospitalist Group   History and Physical      CHIEF COMPLAINT: Altered mental status, difficulty speaking    History of Present Illness:  79 y.o. female with a history of hearing loss, hyperlipidemia, hypertension, hypothyroidism, presents with altered mental status. It was reported that according to family patient was becoming more slurred speech and confused. Patient is denying any complaints other than abnormality in her speech. No weakness or sensory deficit. Patient denies urinary symptoms. REVIEW OF SYSTEMS:  no fevers, chills, cp, sob, n/v, ha, vision/hearing changes, wt changes, hot/cold flashes, other open skin lesions, diarrhea, constipation, dysuria/hematuria unless noted in HPI. Complete ROS performed with the patient and is otherwise negative. PMH:  Past Medical History:   Diagnosis Date    Allergic rhinitis     Asthma     uses flovent    Hearing loss     Hyperlipidemia     Hypertension     Hypothyroidism     Lichen planus     Dr. Naveen Diop       Surgical History:  Past Surgical History:   Procedure Laterality Date    HYSTERECTOMY, TOTAL ABDOMINAL      TONSILLECTOMY         Medications Prior to Admission:    Prior to Admission medications    Medication Sig Start Date End Date Taking? Authorizing Provider   FLOVENT HFA 44 MCG/ACT inhaler INHALE 1 PUFF BY MOUTH TWICE DAILY FOR 90 DAYS. 4/28/21   Historical Provider, MD   metoprolol succinate (TOPROL XL) 100 MG extended release tablet Take 100 mg by mouth daily    Historical Provider, MD   lisinopril (PRINIVIL;ZESTRIL) 20 MG tablet Take 1 tablet by mouth daily 10/11/18   Alejandra Penn MD   levothyroxine (SYNTHROID) 75 MCG tablet Take 75 mcg by mouth Daily    Historical Provider, MD   atorvastatin (LIPITOR) 20 MG tablet Take 20 mg by mouth daily    Historical Provider, MD       Allergies:    Albuterol, Amlodipine, and Metformin and related    Social History:    reports that she has never smoked.  She has never used smokeless tobacco. She reports that she does not drink alcohol and does not use drugs. Family History:       Problem Relation Age of Onset    High Blood Pressure Mother        PHYSICAL EXAM:  Vitals:  BP (!) 178/75   Pulse 62   Temp 97.6 °F (36.4 °C) (Oral)   Resp 17   Wt 120 lb (54.4 kg)   SpO2 99%   BMI 20.60 kg/m²   General Appearance: alert and oriented to person, place and time, well developed and well- nourished, in no acute distress  Skin: warm and dry, no rash or erythema  Head: normocephalic and atraumatic  Eyes: pupils equal, round, and reactive to light, extraocular eye movements intact, conjunctivae normal  ENT: tympanic membrane, external ear and ear canal normal bilaterally, nose without deformity, nasal mucosa and turbinates normal without polyps, hearing difficulty  Neck: supple and non-tender without mass, no thyromegaly or thyroid nodules, no cervical lymphadenopathy  Pulmonary/Chest: clear to auscultation bilaterally- no wheezes   Cardiovascular: normal rate, regular rhythm, normal S1 and S2, no murmurs   Abdomen: soft, non-tender, non-distended, normal bowel sounds, no masses or organomegaly  Extremities: no cyanosis, clubbing or edema  Musculoskeletal: normal range of motion, no joint swelling, deformity or tenderness  Neurologic: reflexes normal and symmetric, no cranial nerve deficit, slurred speech noted       LABS:  Recent Labs     11/21/21  2100      K 4.4      CO2 25   BUN 12   CREATININE 0.93*   GLUCOSE 112*   CALCIUM 8.3*       Recent Labs     11/21/21  2100   WBC 9.4   RBC 4.23   HGB 13.5   HCT 40.0   MCV 94.6   MCH 31.9*   MCHC 33.7   RDW 13.2          No results for input(s): POCGLU in the last 72 hours.     CBC with Differential:    Lab Results   Component Value Date    WBC 9.4 11/21/2021    RBC 4.23 11/21/2021    HGB 13.5 11/21/2021    HCT 40.0 11/21/2021     11/21/2021    MCV 94.6 11/21/2021    MCH 31.9 11/21/2021    MCHC 33.7 11/21/2021    RDW 13.2 11/21/2021 LYMPHOPCT 12.3 11/21/2021    MONOPCT 8.5 11/21/2021    BASOPCT 0.6 11/21/2021    MONOSABS 0.8 11/21/2021    LYMPHSABS 1.2 11/21/2021    EOSABS 0.1 11/21/2021    BASOSABS 0.1 11/21/2021     CMP:    Lab Results   Component Value Date     11/21/2021    K 4.4 11/21/2021    K 4.1 06/13/2021     11/21/2021    CO2 25 11/21/2021    BUN 12 11/21/2021    CREATININE 0.93 11/21/2021    GFRAA >60.0 11/21/2021    LABGLOM 58.0 11/21/2021    GLUCOSE 112 11/21/2021    PROT 6.8 11/21/2021    LABALBU 3.6 11/21/2021    CALCIUM 8.3 11/21/2021    BILITOT 0.4 11/21/2021    ALKPHOS 54 11/21/2021    AST 37 11/21/2021    ALT 21 11/21/2021       Radiology: CT Head WO Contrast    Result Date: 11/21/2021  Patient: Janis Casiano  Time Out: 22:26 Exam(s): CT HEAD Without Contrast  EXAM:   CT Head Without Intravenous Contrast  CLINICAL HISTORY:    Reason for exam: speech difficulty. Chief complaint difficulty speaking, no trauma, started new med  TECHNIQUE:   Axial computed tomography images of the head/brain without intravenous contrast.  All CT scan at this facility use dose modulation, iterative reconstruction, and/or weight based dosing when appropriate to reduce radiation dose to as low as reasonably achievable. COMPARISON:   6/12/21  FINDINGS:   Brain:  No acute intracranial hemorrhage, large hypodensity, or significant mass effect. Nonspecific areas of hypoattenuation in the periventricular white matter likely represent the sequela of chronic small vessel ischemic disease. Small focus of encephalomalacia in the left frontal lobe. Ventricles:  Ventricular and sulcal prominence commensurate with the patient's age. Bones/joints:  No acute abnormality. Soft tissues:  No significant abnormality. Sinuses:  No significant abnormality. Mastoid air cells:  No significant abnormality. Electronically signed by Socrates Bautista MD on 11-21-21 at 2226      No acute intracranial abnormality.              ASSESSMENT/ PLAN[de-identified] Active Problems:    AMS (altered mental status)  Resolved Problems:    * No resolved hospital problems. *      1. Altered mental status/slurred speech   Confusion and difficulty speaking   History of deafness which can contribute to her difficulty speaking but patient says that is not her baseline   Questionable stroke   UA positive for UTI-we will treat   Consult neurology   Order MRI  2. UTI   Asymptomatic   Reported that patient was confused   Will treat with Rocephin and monitor  3. Hypertension   Blood pressure on presentation was uncontrolled-high as 200/72 in the ER   Resume home medication   Hydralazine as needed    Code Status: Full  DVT prophylaxis: Lovenox         Electronically signed by Alexx Jones MD on 11/21/2021 at 11:53 PM      NOTE: This report was transcribed using voice recognition software. Every effort was made to ensure accuracy; however, inadvertent computerized transcription errors may be present.
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
## 2021-11-22 NOTE — ED TRIAGE NOTES
PT ARRIVED TO ED VIA EMS WITH C/C GENERALIZED WEAKNESS AND ALTERED MENTAL STATUS. PT SON CALLED EMS, REPORTED THAT SHE HAS BEEN INCREASING IN CONFUSION ALL DAY, LKW WAS ON 11/20/21 EVENING. PT REPORTS SHE IS DEAF, BUT HAS NEVER HAD PROBLEMS SPEAKING. PT SPEECH IS SLOW AND SLURRED. NO DRIFT NOTED IN EXTREMITIES, FACE SYMMETRICAL.

## 2021-11-22 NOTE — FLOWSHEET NOTE
Patient son called and stated patient has been passing out at home and feels like her equilibrium has been off for a few months. He reports patient was suppose to go to [address] [place] [address] for some testing in December regarding this but he is unable to recall what it is called.  Electronically signed by Scott Mishra RN on 11/22/2021 at 4:44 PM

## 2021-11-23 ENCOUNTER — APPOINTMENT (OUTPATIENT)
Dept: CT IMAGING | Age: 81
DRG: 065 | End: 2021-11-23
Attending: INTERNAL MEDICINE
Payer: MEDICARE

## 2021-11-23 LAB
ALBUMIN SERPL-MCNC: 3.1 G/DL (ref 3.5–4.6)
ALP BLD-CCNC: 48 U/L (ref 40–130)
ALT SERPL-CCNC: 10 U/L (ref 0–33)
ANION GAP SERPL CALCULATED.3IONS-SCNC: 9 MEQ/L (ref 9–15)
AST SERPL-CCNC: 27 U/L (ref 0–35)
BASOPHILS ABSOLUTE: 0 K/UL (ref 0–0.1)
BASOPHILS RELATIVE PERCENT: 0.4 % (ref 0.1–1.2)
BILIRUB SERPL-MCNC: 0.3 MG/DL (ref 0.2–0.7)
BUN BLDV-MCNC: 8 MG/DL (ref 8–23)
CALCIUM SERPL-MCNC: 7.9 MG/DL (ref 8.5–9.9)
CHLORIDE BLD-SCNC: 107 MEQ/L (ref 95–107)
CO2: 26 MEQ/L (ref 20–31)
CREAT SERPL-MCNC: 0.79 MG/DL (ref 0.5–0.9)
EKG ATRIAL RATE: 73 BPM
EKG P AXIS: 75 DEGREES
EKG P-R INTERVAL: 174 MS
EKG Q-T INTERVAL: 386 MS
EKG QRS DURATION: 70 MS
EKG QTC CALCULATION (BAZETT): 425 MS
EKG R AXIS: -59 DEGREES
EKG T AXIS: 54 DEGREES
EKG VENTRICULAR RATE: 73 BPM
EOSINOPHILS ABSOLUTE: 0.1 K/UL (ref 0–0.4)
EOSINOPHILS RELATIVE PERCENT: 1.4 % (ref 0.7–5.8)
GFR AFRICAN AMERICAN: >60
GFR NON-AFRICAN AMERICAN: >60
GLOBULIN: 2.8 G/DL (ref 2.3–3.5)
GLUCOSE BLD-MCNC: 127 MG/DL (ref 70–99)
HCT VFR BLD CALC: 31.9 % (ref 37–47)
HEMOGLOBIN: 11.8 G/DL (ref 11.2–15.7)
IMMATURE GRANULOCYTES #: 0 K/UL
IMMATURE GRANULOCYTES %: 0.2 %
LYMPHOCYTES ABSOLUTE: 0.9 K/UL (ref 1.2–3.7)
LYMPHOCYTES RELATIVE PERCENT: 16.6 %
MAGNESIUM: 1.5 MG/DL (ref 1.7–2.4)
MCH RBC QN AUTO: 33.1 PG (ref 25.6–32.2)
MCHC RBC AUTO-ENTMCNC: 37 % (ref 32.2–35.5)
MCV RBC AUTO: 89.4 FL (ref 79.4–94.8)
MONOCYTES ABSOLUTE: 0.5 K/UL (ref 0.2–0.9)
MONOCYTES RELATIVE PERCENT: 10.1 % (ref 4.7–12.5)
NEUTROPHILS ABSOLUTE: 3.7 K/UL (ref 1.6–6.1)
NEUTROPHILS RELATIVE PERCENT: 71.3 % (ref 34–71.1)
PDW BLD-RTO: 14.1 % (ref 11.7–14.4)
PLATELET # BLD: 275 K/UL (ref 182–369)
POTASSIUM SERPL-SCNC: 3.5 MEQ/L (ref 3.4–4.9)
RBC # BLD: 3.57 M/UL (ref 3.93–5.22)
SODIUM BLD-SCNC: 142 MEQ/L (ref 135–144)
TOTAL PROTEIN: 5.9 G/DL (ref 6.3–8)
WBC # BLD: 5.1 K/UL (ref 4–10)

## 2021-11-23 PROCEDURE — 6370000000 HC RX 637 (ALT 250 FOR IP): Performed by: INTERNAL MEDICINE

## 2021-11-23 PROCEDURE — 97530 THERAPEUTIC ACTIVITIES: CPT

## 2021-11-23 PROCEDURE — 92523 SPEECH SOUND LANG COMPREHEN: CPT

## 2021-11-23 PROCEDURE — 92610 EVALUATE SWALLOWING FUNCTION: CPT

## 2021-11-23 PROCEDURE — 93010 ELECTROCARDIOGRAM REPORT: CPT | Performed by: INTERNAL MEDICINE

## 2021-11-23 PROCEDURE — 1210000000 HC MED SURG R&B

## 2021-11-23 PROCEDURE — 6360000002 HC RX W HCPCS: Performed by: INTERNAL MEDICINE

## 2021-11-23 PROCEDURE — 83735 ASSAY OF MAGNESIUM: CPT

## 2021-11-23 PROCEDURE — 2580000003 HC RX 258: Performed by: INTERNAL MEDICINE

## 2021-11-23 PROCEDURE — 70496 CT ANGIOGRAPHY HEAD: CPT

## 2021-11-23 PROCEDURE — 99223 1ST HOSP IP/OBS HIGH 75: CPT | Performed by: INTERNAL MEDICINE

## 2021-11-23 PROCEDURE — 80053 COMPREHEN METABOLIC PANEL: CPT

## 2021-11-23 PROCEDURE — 85025 COMPLETE CBC W/AUTO DIFF WBC: CPT

## 2021-11-23 PROCEDURE — 36415 COLL VENOUS BLD VENIPUNCTURE: CPT

## 2021-11-23 PROCEDURE — 94640 AIRWAY INHALATION TREATMENT: CPT

## 2021-11-23 PROCEDURE — 97162 PT EVAL MOD COMPLEX 30 MIN: CPT

## 2021-11-23 PROCEDURE — 97166 OT EVAL MOD COMPLEX 45 MIN: CPT

## 2021-11-23 PROCEDURE — 6360000004 HC RX CONTRAST MEDICATION: Performed by: PSYCHIATRY & NEUROLOGY

## 2021-11-23 PROCEDURE — 97535 SELF CARE MNGMENT TRAINING: CPT

## 2021-11-23 PROCEDURE — 70498 CT ANGIOGRAPHY NECK: CPT

## 2021-11-23 RX ORDER — SODIUM CHLORIDE 0.9 % (FLUSH) 0.9 %
5-40 SYRINGE (ML) INJECTION PRN
Status: CANCELLED | OUTPATIENT
Start: 2021-11-23

## 2021-11-23 RX ORDER — SODIUM CHLORIDE 0.9 % (FLUSH) 0.9 %
5-40 SYRINGE (ML) INJECTION EVERY 12 HOURS SCHEDULED
Status: CANCELLED | OUTPATIENT
Start: 2021-11-23

## 2021-11-23 RX ORDER — SODIUM CHLORIDE 9 MG/ML
25 INJECTION, SOLUTION INTRAVENOUS PRN
Status: CANCELLED | OUTPATIENT
Start: 2021-11-23

## 2021-11-23 RX ORDER — LISINOPRIL 20 MG/1
20 TABLET ORAL 2 TIMES DAILY
Status: DISCONTINUED | OUTPATIENT
Start: 2021-11-23 | End: 2021-11-24

## 2021-11-23 RX ORDER — SODIUM CHLORIDE 9 MG/ML
INJECTION, SOLUTION INTRAVENOUS CONTINUOUS
Status: CANCELLED | OUTPATIENT
Start: 2021-11-23

## 2021-11-23 RX ORDER — LORAZEPAM 0.5 MG/1
0.5 TABLET ORAL EVERY 4 HOURS PRN
Status: DISCONTINUED | OUTPATIENT
Start: 2021-11-23 | End: 2021-11-25 | Stop reason: HOSPADM

## 2021-11-23 RX ORDER — TRAMADOL HYDROCHLORIDE 50 MG/1
50 TABLET ORAL EVERY 6 HOURS PRN
Status: DISCONTINUED | OUTPATIENT
Start: 2021-11-23 | End: 2021-11-25 | Stop reason: HOSPADM

## 2021-11-23 RX ORDER — POTASSIUM CHLORIDE 750 MG/1
40 TABLET, EXTENDED RELEASE ORAL ONCE
Status: COMPLETED | OUTPATIENT
Start: 2021-11-23 | End: 2021-11-23

## 2021-11-23 RX ADMIN — ATORVASTATIN CALCIUM 20 MG: 10 TABLET, FILM COATED ORAL at 21:51

## 2021-11-23 RX ADMIN — MAGNESIUM OXIDE 400 MG: 400 TABLET ORAL at 10:33

## 2021-11-23 RX ADMIN — LEVOTHYROXINE SODIUM 75 MCG: 75 TABLET ORAL at 06:55

## 2021-11-23 RX ADMIN — BUDESONIDE 250 MCG: 0.5 SUSPENSION RESPIRATORY (INHALATION) at 18:00

## 2021-11-23 RX ADMIN — METOPROLOL SUCCINATE 25 MG: 25 TABLET, FILM COATED, EXTENDED RELEASE ORAL at 07:56

## 2021-11-23 RX ADMIN — Medication 10 ML: at 08:00

## 2021-11-23 RX ADMIN — MAGNESIUM OXIDE 400 MG: 400 TABLET ORAL at 07:56

## 2021-11-23 RX ADMIN — CEFTRIAXONE 1000 MG: 1 INJECTION, POWDER, FOR SOLUTION INTRAMUSCULAR; INTRAVENOUS at 21:51

## 2021-11-23 RX ADMIN — LORAZEPAM 0.5 MG: 0.5 TABLET ORAL at 07:56

## 2021-11-23 RX ADMIN — LISINOPRIL 20 MG: 20 TABLET ORAL at 21:51

## 2021-11-23 RX ADMIN — POTASSIUM CHLORIDE 40 MEQ: 750 TABLET, EXTENDED RELEASE ORAL at 10:15

## 2021-11-23 RX ADMIN — MAGNESIUM OXIDE 800 MG: 400 TABLET ORAL at 21:51

## 2021-11-23 RX ADMIN — HYDRALAZINE HYDROCHLORIDE 10 MG: 20 INJECTION, SOLUTION INTRAMUSCULAR; INTRAVENOUS at 17:13

## 2021-11-23 RX ADMIN — Medication 10 ML: at 22:12

## 2021-11-23 RX ADMIN — BUDESONIDE 250 MCG: 0.5 SUSPENSION RESPIRATORY (INHALATION) at 06:11

## 2021-11-23 RX ADMIN — LORAZEPAM 0.5 MG: 0.5 TABLET ORAL at 22:03

## 2021-11-23 RX ADMIN — LISINOPRIL 20 MG: 20 TABLET ORAL at 07:56

## 2021-11-23 RX ADMIN — IOPAMIDOL 100 ML: 755 INJECTION, SOLUTION INTRAVENOUS at 09:26

## 2021-11-23 RX ADMIN — HYDRALAZINE HYDROCHLORIDE 10 MG: 20 INJECTION, SOLUTION INTRAMUSCULAR; INTRAVENOUS at 06:55

## 2021-11-23 ASSESSMENT — ENCOUNTER SYMPTOMS
TROUBLE SWALLOWING: 0
NAUSEA: 0
FACIAL SWELLING: 0
ABDOMINAL DISTENTION: 0
VOMITING: 0
BLOOD IN STOOL: 0
WHEEZING: 0
VOICE CHANGE: 0
CHEST TIGHTNESS: 0
APNEA: 0
ANAL BLEEDING: 0
SHORTNESS OF BREATH: 0
COLOR CHANGE: 0

## 2021-11-23 ASSESSMENT — PAIN SCALES - GENERAL
PAINLEVEL_OUTOF10: 0

## 2021-11-23 NOTE — PROGRESS NOTES
Hospitalist Progress Note      PCP: Edyta Mckay MD    Date of Admission: 11/22/2021    Chief Complaint: weakness    Subjective: pt awake, alert, follow commands     Medications:  Reviewed    Infusion Medications    sodium chloride       Scheduled Medications    magnesium oxide  400 mg Oral BID    lisinopril  20 mg Oral BID    influenza virus vaccine  0.5 mL IntraMUSCular Prior to discharge    atorvastatin  20 mg Oral Nightly    levothyroxine  75 mcg Oral Daily    sodium chloride flush  5-40 mL IntraVENous 2 times per day    enoxaparin  40 mg SubCUTAneous Daily    cefTRIAXone (ROCEPHIN) IV  1,000 mg IntraVENous Q24H    metoprolol succinate  25 mg Oral Daily    magnesium oxide  400 mg Oral BID    budesonide  250 mcg Nebulization BID    [Held by provider] aspirin  81 mg Oral Daily     PRN Meds: LORazepam, traMADol, sodium chloride flush, sodium chloride, ondansetron **OR** ondansetron, polyethylene glycol, acetaminophen **OR** acetaminophen, hydrALAZINE    No intake or output data in the 24 hours ending 11/23/21 1019    Exam:    BP (!) 159/59   Pulse 91   Temp 97.6 °F (36.4 °C) (Oral)   Resp 16   Ht 5' 4\" (1.626 m)   Wt 131 lb (59.4 kg)   SpO2 99%   BMI 22.49 kg/m²     General appearance: No apparent distress, appears stated age and cooperative. HEENT: Pupils equal, round, and reactive to light. Conjunctivae/corneas clear. Neck: Supple, with full range of motion. No jugular venous distention. Trachea midline. Respiratory:  Normal respiratory effort. Clear to auscultation, bilaterally without Rales/Wheezes/Rhonchi. Cardiovascular: Regular rate and rhythm with normal S1/S2 without murmurs, rubs or gallops. Abdomen: Soft, non-tender, non-distended with normal bowel sounds. Musculoskeletal: No clubbing, cyanosis or edema bilaterally. Full range of motion without deformity. Skin: Skin color, texture, turgor normal.  No rashes or lesions.   Neurologic:  Neurovascularly intact without any focal sensory/motor deficits. Cranial nerves: II-XII intact, grossly non-focal.  Psychiatric: Alert and oriented,   Capillary Refill: Brisk,< 3 seconds   Peripheral Pulses: +2 palpable, equal bilaterally       Labs:   Recent Labs     11/21/21 2100 11/22/21  0600 11/23/21  0538   WBC 9.4 5.5 5.1   HGB 13.5 11.7* 11.8   HCT 40.0 33.8* 31.9*    238 275     Recent Labs     11/21/21 2100 11/22/21  0600 11/23/21  0538    141 142   K 4.4 3.3* 3.5    109* 107   CO2 25 25 26   BUN 12 9 8   CREATININE 0.93* 0.89 0.79   CALCIUM 8.3* 7.8* 7.9*     Recent Labs     11/21/21 2100 11/22/21  0600 11/23/21  0538   AST 37* 24 27   ALT 21 14 10   BILITOT 0.4 0.3 0.3   ALKPHOS 54 44 48     No results for input(s): INR in the last 72 hours. Recent Labs     11/21/21 2100   TROPONINI <0.010       Urinalysis:      Lab Results   Component Value Date    NITRU POSITIVE 11/21/2021    WBCUA  11/21/2021    BACTERIA MANY 11/21/2021    RBCUA 0-2 11/21/2021    BLOODU Negative 11/21/2021    SPECGRAV 1.009 11/21/2021    GLUCOSEU Negative 11/21/2021       Radiology:  CTA HEAD W WO CONTRAST    (Results Pending)   CTA NECK W CONTRAST    (Results Pending)           Assessment/Plan:    Active Hospital Problems    Diagnosis Date Noted    Acute cerebrovascular accident (CVA) ([facility] [address] [I63.9] 11/22/2021         DVT Prophylaxis: lovenox  Diet: ADULT DIET; Regular  Diet NPO  Code Status: Full Code    PT/OT Eval Status: done    Dispo - CVA with different vascular territories- appreciate neuro/cardio consults, MAIK tomorrow AM, ASA/statin per neurology service  Wernicke aphasia due to above- supportive care.  PT on case  UTI- atbs initiated, follow up with UC report  HTN- meds adjusted, follow up clinically  Hypokalemia/hypomagnesemia- replace, follow up with BMP AM    Medically stable for acute admission at [place], will follow up along with consultants regarding further recommendations        Electronically signed by Negro Price MD on 11/23/2021 at 10:19 AM

## 2021-11-23 NOTE — PROGRESS NOTES
Occupational Therapy   Occupational Therapy Initial Assessment- Med  Date: 2021   Patient Name: Ragini Shelley  MRN: [id number]     : 1940    Date of Service: 2021    Discharge Recommendations:  Home with assist PRN       Assessment   Performance deficits / Impairments: Decreased functional mobility ; Decreased ADL status; Decreased cognition; Decreased endurance; Decreased balance; Decreased high-level IADLs  Assessment: Pt is a 68y/o female PLOF lives with son, was I with ADL and IADL, whom presents to [facility] 2* Stroke, Acute cystitis without hematuria, AMS. Pt demo's the above resulting perf def/impair and would benefit from OT skilled services to maximize strength/end and safety/I with ADL for safe d/c transition. This therapist needed to write down all questions d/t pt very [place] and difficulty understanding this therapist. Referred to SLP for cog eval to assist pt with writing again, etc 2* pt informed she is needing to relearn how to write, etc after the stroke. Treatment Diagnosis: Stroke, Acute cystitis without hematuria, AMS  Prognosis: Good  History: multi comorb  Exam: 6 perf def/impair  OT Education: OT Role; Plan of Care  REQUIRES OT FOLLOW UP: Yes  Safety Devices  Safety Devices in place: Yes  Type of devices: All fall risk precautions in place           Patient Diagnosis(es): There were no encounter diagnoses. has a past medical history of Allergic rhinitis, Asthma, Hearing loss, Hyperlipidemia, Hypertension, Hypothyroidism, and Lichen planus. has a past surgical history that includes Hysterectomy, total abdominal and Tonsillectomy.     Treatment Diagnosis: Stroke, Acute cystitis without hematuria, AMS      Restrictions  Restrictions/Precautions  Restrictions/Precautions: Fall Risk    Subjective   General  Chart Reviewed: Yes  Patient assessed for rehabilitation services?: Yes  Family / Caregiver Present: No  Referring Practitioner: Dr. Lucius Boeck  Diagnosis: Stroke, Acute cystitis without hematuria, AMS  Subjective  Subjective: Pt pleasant, [place]/receptive aphasia- did well with written communication  Patient Currently in Pain: No  Pain Assessment  Pain Assessment: 0-10  Pain Level: 0  Vital Signs  Patient Currently in Pain: No  Social/Functional History  Social/Functional History  Lives With: Son  Type of Home: House  Home Layout: Multi-level, Able to Live on Main level with bedroom/bathroom  Home Access: Level entry  Bathroom Shower/Tub: Tub/Shower unit  [employer]: Handicap height  Bathroom Equipment: Grab bars in shower, Shower chair, Hand-held shower  Bathroom Accessibility: Accessible  Home Equipment: [facility] Help From: Family (\"just my son\")  ADL Assistance: Independent  Homemaking Assistance: Independent  Homemaking Responsibilities: Yes  Ambulation Assistance: Independent (w/o AD)  Transfer Assistance: Independent  Active : No  Occupation: Retired  Type of occupation: Nurses aide,   Leisure & Hobbies: Painting- rock painting  IADL Comments: Cat at home       Objective   Hearing: Exceptions to [facility]  Hearing Exceptions: Hard of hearing/hearing concerns (LT hearing problems, hearing aides did not work per pt.)    Orientation  Overall Orientation Status: Within Functional Limits  Observation/Palpation  Observation: IV RUE and LUE  Functional Mobility  Functional - Mobility Device: No device  Activity: To/from bathroom;  Other (level tile surface in hallway)  Assist Level: Stand by assistance  Toilet Transfers  Toilet - Technique: Ambulating  Equipment Used: Grab bars  Toilet Transfer: Stand by assistance; Supervision  ADL  Feeding: Modified independent   Grooming: Modified independent   UE Bathing: Supervision  LE Bathing: Stand by assistance  UE Dressing: Modified independent   LE Dressing: Stand by assistance  Toileting: Stand by assistance; Supervision  Tone RUE  RUE Tone: Normotonic  Tone LUE  LUE Tone: Normotonic  Coordination  Movements Are Fluid And Coordinated: Yes     Bed mobility  Rolling to Left: Independent  Rolling to Right: Independent  Supine to Sit: Independent  Sit to Supine: Independent  Transfers  Sit to stand: Supervision  Stand to sit: Supervision                       LUE AROM (degrees)  LUE AROM : WFL  Left Hand AROM (degrees)  Left Hand AROM: WFL  RUE AROM (degrees)  RUE AROM : WFL  Right Hand AROM (degrees)  Right Hand AROM: WFL  LUE Strength  Gross LUE Strength: WFL  L Shoulder Flex: 4/5  RUE Strength  Gross RUE Strength: WFL  R Shoulder Flex: 4/5     Hand Dominance  Hand Dominance: Right             Plan   Plan  Times per week: 4-7x/wk  Times per day: Daily  Plan weeks: <1- med pt  Current Treatment Recommendations: Strengthening, Balance Training, Functional Mobility Training, Endurance Training, Safety Education & Training, Patient/Caregiver Education & Training, Self-Care / ADL, Home Management Training          Goals  Short term goals  Time Frame for Short term goals: 3-5 days- med pt  Short term goal 1: I BUE HEP  Short term goal 2: Spv LB dressing and bathing  Short term goal 3: Spv toileting  Short term goal 4: Spv light homemaking tasks  Long term goals  Time Frame for Long term goals : Same as STGs  Long term goal 1: Same as STGs  Long term goal 2: Same as STGs  Patient Goals   Patient goals :  To get better       Therapy Time   Individual Concurrent Group Co-treatment   Time In  9:20         Time Out  10:30         Minutes  [address], [place]

## 2021-11-23 NOTE — PROGRESS NOTES
Speech Language Pathology  Facility/Department: [facility] MED SURG UNIT   CLINICAL BEDSIDE SWALLOW EVALUATION    NAME: Hilda Gayle  : 1940  MRN: [id number]    ADMISSION DATE: 2021  ADMITTING DIAGNOSIS: has Hypothyroidism; Allergic rhinitis; Hypertension; Hyperlipidemia; Lichen planus; Hearing loss; Asthma; TIN (acute kidney injury) ([facility] [address] Syncope; E. coli UTI; AMS (altered mental status); and Acute cerebrovascular accident (CVA) ([facility] [address] on their problem list.  ONSET DATE:     Recent Chest Xray/CT of Chest:     Date of Eval: 2021  Evaluating Therapist: BETO Bush    Current Diet level:   NPO      Primary Complaint   CVA    Pain:  Pain Assessment  Pain Assessment: 0-10  Pain Level: 0    Reason for Referral  Hilda Gayle was referred for a bedside swallow evaluation to assess the efficiency of her swallow function, identify signs and symptoms of aspiration and make recommendations regarding safe dietary consistencies, effective compensatory strategies, and safe eating environment. Impression  Dysphagia Diagnosis: Swallow function appears grossly intact  Dysphagia Impression : She eats small amounts. Is trying to lose weight. Dysphagia Outcome Severity Scale: Level 7: Normal in all situations     Treatment Plan  Requires SLP Intervention: No  Duration/Frequency of Treatment: eval only  D/C Recommendations: Home independently       Recommended Diet and Intervention  Diet Solids Recommendation: Regular  Liquid Consistency Recommendation: Thin  Recommended Form of Meds: PO          Compensatory Swallowing Strategies  Compensatory Swallowing Strategies: Upright as possible for all oral intake; Eat/Feed slowly    Treatment/Goals   no tx indicated    General              Vision/Hearing  Vision  Vision: Within Functional Limits  Hearing  Hearing: Exceptions to [facility]  Hearing Exceptions: No hearing aid; Hard of hearing/hearing concerns    Oral Motor Deficits  Oral/Motor  Oral Motor:  Within functional limits    Oral Phase Dysfunction  Oral Phase  Oral Phase: WNL     Indicators of Pharyngeal Phase Dysfunction   Pharyngeal Phase  Pharyngeal Phase: WNL    Prognosis  Individuals consulted  Consulted and agree with results and recommendations: Patient; RN    Education     Safety Devices in place: Yes  Type of devices: Left in chair; Call light within reach       Therapy Time  SLP Individual Minutes  Time In: 1210  Time Out: 0531  Minutes: Elver 35, SLP  11/23/2021 12:37 PM

## 2021-11-23 NOTE — CONSULTS
Consult Note  Patient: Osbaldo Retana  Unit/Bed: 8958/8650-38  YOB: 1940  MRN: [id number]  Acct: [de-identified]   Admitting Diagnosis: Altered mental state [R41.82]  Acute cerebrovascular accident (CVA) [facility]) [I63.9]  Date:  11/22/2021  Hospital Day: 1      Chief Complaint:  CVA    History of Present Illness:  Middle-aged female who was admitted with CVA. Requested by Dr.Bej PLEITEZ patient has a past history of hypertension hyperlipidemia hypothyroidism. Admitted with speech impediment. No definite structural heart disease.   No chest pains no CHF    Allergies   Allergen Reactions    Albuterol     Amlodipine     Hydralazine Itching    Metformin And Related     Zoster Vac Recomb Adjuvanted Itching     Shingles Vaccine        Current Facility-Administered Medications   Medication Dose Route Frequency Provider Last Rate Last Admin    LORazepam (ATIVAN) tablet 0.5 mg  0.5 mg Oral Q4H PRN Garry Castaneda MD   0.5 mg at 11/23/21 0756    traMADol (ULTRAM) tablet 50 mg  50 mg Oral Q6H PRN Garry Castaneda MD        influenza quadrivalent split vaccine (FLUZONE;FLUARIX;FLULAVAL;AFLURIA) injection 0.5 mL  0.5 mL IntraMUSCular Prior to discharge Garry Castaneda MD        atorvastatin (LIPITOR) tablet 20 mg  20 mg Oral Nightly Garry Castaneda MD   20 mg at 11/22/21 2051    levothyroxine (SYNTHROID) tablet 75 mcg  75 mcg Oral Daily Garry Castaneda MD   75 mcg at 11/23/21 0655    lisinopril (PRINIVIL;ZESTRIL) tablet 20 mg  20 mg Oral Daily Garry Castaneda MD   20 mg at 11/23/21 0756    sodium chloride flush 0.9 % injection 5-40 mL  5-40 mL IntraVENous 2 times per day Garry Castaneda MD   10 mL at 11/22/21 2051    sodium chloride flush 0.9 % injection 5-40 mL  5-40 mL IntraVENous PRN Garry Castaneda MD        0.9 % sodium chloride infusion  25 mL IntraVENous PRN Garry Castaneda MD        enoxaparin (LOVENOX) injection 40 mg  40 mg SubCUTAneous Daily Garry Castaneda MD        ondansetron (ZOFRAN-ODT) disintegrating tablet 4 mg  4 mg Oral Q8H PRN Leonila Holland MD        Or    ondansetron [facility]) injection 4 mg  4 mg IntraVENous Q6H PRN Leonila Holland MD        polyethylene glycol [facility]) packet 17 g  17 g Oral Daily PRN Leonila Holland MD        acetaminophen (TYLENOL) tablet 650 mg  650 mg Oral Q6H PRN Leonila Holland MD        Or    acetaminophen (TYLENOL) suppository 650 mg  650 mg Rectal Q6H PRN Leonila Holland MD        hydrALAZINE (APRESOLINE) injection 10 mg  10 mg IntraVENous Q4H PRN Leonila Holland MD   10 mg at 11/23/21 0655    cefTRIAXone (ROCEPHIN) 1000 mg IVPB in 50 mL D5W minibag  1,000 mg IntraVENous Q24H Leonila Holland MD   Stopped at 11/22/21 2129    metoprolol succinate (TOPROL XL) extended release tablet 25 mg  25 mg Oral Daily Leonila Holland MD   25 mg at 11/23/21 0756    magnesium oxide (MAG-OX) tablet 400 mg  400 mg Oral BID Leonila Holland MD   400 mg at 11/23/21 0756    budesonide (PULMICORT) nebulizer suspension 250 mcg  250 mcg Nebulization BID Leonila Holland MD   250 mcg at 11/23/21 9852    [Held by provider] aspirin chewable tablet 81 mg  81 mg Oral Daily Antoni Soliz MD           PMHx:  Past Medical History:   Diagnosis Date    Allergic rhinitis     Asthma     uses flovent    Hearing loss     Hyperlipidemia     Hypertension     Hypothyroidism     Lichen planus     Dr. Rafy Cee       PSHx:  Past Surgical History:   Procedure Laterality Date    HYSTERECTOMY, TOTAL ABDOMINAL      TONSILLECTOMY         Social Hx:  Social History     Socioeconomic History    Marital status: Single     Spouse name: None    Number of children: 2    Years of education: None    Highest education level: None   Occupational History    None   Tobacco Use    Smoking status: Never Smoker    Smokeless tobacco: Never Used   Substance and Sexual Activity    Alcohol use: No    Drug use: No    Sexual activity: Not Currently   Other Topics Concern    None   Social History Karl    Has 2 sons. One of her sons lives with her in a mobile home. Social Determinants of Health     Financial Resource Strain:     Difficulty of Paying Living Expenses: Not on file   Food Insecurity:     Worried About Running Out of Food in the Last Year: Not on file    Genevieve of Food in the Last Year: Not on file   Transportation Needs:     Lack of Transportation (Medical): Not on file    Lack of Transportation (Non-Medical): Not on file   Physical Activity:     Days of Exercise per Week: Not on file    Minutes of Exercise per Session: Not on file   Stress:     Feeling of Stress : Not on file   Social Connections:     Frequency of Communication with Friends and Family: Not on file    Frequency of Social Gatherings with Friends and Family: Not on file    Attends [employer] Services: Not on file    Active Member of [address] [place] [address] [employer] or Organizations: Not on file    Attends Club or Organization Meetings: Not on file    Marital Status: Not on file   Intimate Partner Violence:     Fear of Current or Ex-Partner: Not on file    Emotionally Abused: Not on file    Physically Abused: Not on file    Sexually Abused: Not on file   Housing Stability:     Unable to Pay for Housing in the Last Year: Not on file    Number of [place] in the Last Year: Not on file    Unstable Housing in the Last Year: Not on file       Family Hx:  Family History   Problem Relation Age of Onset    High Blood Pressure Mother        Review of Systems:   Review of Systems   Constitutional: Negative for activity change, appetite change, diaphoresis, fatigue and unexpected weight change. HENT: Negative for facial swelling, nosebleeds, trouble swallowing and voice change. Respiratory: Negative for apnea, chest tightness, shortness of breath and wheezing. Cardiovascular: Positive for palpitations. Negative for chest pain and leg swelling. Gastrointestinal: Negative for abdominal distention, anal bleeding, blood in stool, nausea and vomiting.    Genitourinary: Negative for decreased urine volume and dysuria. Musculoskeletal: Negative for gait problem and myalgias. Skin: Negative for color change, pallor, rash and wound. Neurological: Negative for dizziness, syncope, facial asymmetry, weakness, light-headedness, numbness and headaches. Hematological: Does not bruise/bleed easily. Psychiatric/Behavioral: Negative for agitation, behavioral problems, confusion, hallucinations and suicidal ideas. The patient is not nervous/anxious. All other systems reviewed and are negative. Physical Examination:    BP (!) 159/59   Pulse 91   Temp 97.6 °F (36.4 °C) (Oral)   Resp 16   Ht 5' 4\" (1.626 m)   Wt 131 lb (59.4 kg)   SpO2 99%   BMI 22.49 kg/m²    Physical Exam  Constitutional:       Appearance: She is well-developed. HENT:      Head: Normocephalic and atraumatic. Right Ear: External ear normal.      Left Ear: External ear normal.   Eyes:      Conjunctiva/sclera: Conjunctivae normal.      Pupils: Pupils are equal, round, and reactive to light. Cardiovascular:      Rate and Rhythm: Normal rate and regular rhythm. Heart sounds: Normal heart sounds. Pulmonary:      Effort: Pulmonary effort is normal.      Breath sounds: Normal breath sounds. Abdominal:      General: Bowel sounds are normal.      Palpations: Abdomen is soft. Musculoskeletal:         General: Normal range of motion. Cervical back: Normal range of motion and neck supple. Skin:     General: Skin is warm and dry. Neurological:      Mental Status: She is alert and oriented to person, place, and time. Deep Tendon Reflexes: Reflexes are normal and symmetric. Psychiatric:         Behavior: Behavior normal.         Thought Content:  Thought content normal.         Judgment: Judgment normal.         LABS:  CBC:  Lab Results   Component Value Date    WBC 5.1 11/23/2021    RBC 3.57 11/23/2021    HGB 11.8 11/23/2021    HCT 31.9 11/23/2021    MCV 89.4 11/23/2021    MCH 33.1 11/23/2021    MCHC 37.0 11/23/2021 RDW 14.1 11/23/2021     11/23/2021    MPV 9.7 09/07/2014     CBC with Differential:   Lab Results   Component Value Date    WBC 5.1 11/23/2021    RBC 3.57 11/23/2021    HGB 11.8 11/23/2021    HCT 31.9 11/23/2021     11/23/2021    MCV 89.4 11/23/2021    MCH 33.1 11/23/2021    MCHC 37.0 11/23/2021    RDW 14.1 11/23/2021    LYMPHOPCT 16.6 11/23/2021    MONOPCT 10.1 11/23/2021    BASOPCT 0.4 11/23/2021    MONOSABS 0.5 11/23/2021    LYMPHSABS 0.9 11/23/2021    EOSABS 0.1 11/23/2021    BASOSABS 0.0 11/23/2021     CMP:    Lab Results   Component Value Date     11/23/2021    K 3.5 11/23/2021    K 4.1 06/13/2021     11/23/2021    CO2 26 11/23/2021    BUN 8 11/23/2021    CREATININE 0.79 11/23/2021    GFRAA >60.0 11/23/2021    LABGLOM >60.0 11/23/2021    GLUCOSE 127 11/23/2021    PROT 5.9 11/23/2021    LABALBU 3.1 11/23/2021    CALCIUM 7.9 11/23/2021    BILITOT 0.3 11/23/2021    ALKPHOS 48 11/23/2021    AST 27 11/23/2021    ALT 10 11/23/2021     BMP:    Lab Results   Component Value Date     11/23/2021    K 3.5 11/23/2021    K 4.1 06/13/2021     11/23/2021    CO2 26 11/23/2021    BUN 8 11/23/2021    LABALBU 3.1 11/23/2021    CREATININE 0.79 11/23/2021    CALCIUM 7.9 11/23/2021    GFRAA >60.0 11/23/2021    LABGLOM >60.0 11/23/2021    GLUCOSE 127 11/23/2021     Magnesium:    Lab Results   Component Value Date    MG 1.5 11/23/2021     Troponin:    Lab Results   Component Value Date    TROPONINI <0.010 11/21/2021       Radiology:  CT Head WO Contrast    Result Date: 11/21/2021  Patient: Sirisha Cid  Time Out: 22:26 Exam(s): CT HEAD Without Contrast  EXAM:   CT Head Without Intravenous Contrast  CLINICAL HISTORY:    Reason for exam: speech difficulty.  Chief complaint difficulty speaking, no trauma, started new med  TECHNIQUE:   Axial computed tomography images of the head/brain without intravenous contrast.  All CT scan at this facility use dose modulation, iterative reconstruction, and/or weight based dosing when appropriate to reduce radiation dose to as low as reasonably achievable. COMPARISON:   6/12/21  FINDINGS:   Brain:  No acute intracranial hemorrhage, large hypodensity, or significant mass effect. Nonspecific areas of hypoattenuation in the periventricular white matter likely represent the sequela of chronic small vessel ischemic disease. Small focus of encephalomalacia in the left frontal lobe. Ventricles:  Ventricular and sulcal prominence commensurate with the patient's age. Bones/joints:  No acute abnormality. Soft tissues:  No significant abnormality. Sinuses:  No significant abnormality. Mastoid air cells:  No significant abnormality. Electronically signed by Radha Alanis MD on 11-21-21 at 2226      No acute intracranial abnormality. MRI BRAIN WO CONTRAST    Result Date: 11/22/2021  EXAMINATION: MRI BRAIN WO CONTRAST CLINICAL HISTORY:  slurred speech? CVA COMPARISONS: Brain MRI from Samra 15, 2021 and brain CT from November 21, 2021 TECHNIQUE: Multiplanar multisequence images of the brain were obtained without contrast. Diffusion perfusion imaging was obtained. BRAIN MRI FINDINGS:  There are no extra-axial collections. There is no evidence of hemorrhage. The susceptibility images do not demonstrate evidence of hemosiderin deposition within the brain parenchyma or the leptomeninges. There is an area of restricted diffusion in the left frontal lobe centered in the subcortical white matter extending to the cortex measuring approximately 3 cm in greatest length with signal characteristics of subacute ischemia in this region. There is no associated edema or mass effect. There is a 3 mm focus of restricted diffusion in the subcortical white matter of the right occipital lobe. There is preservation of the gray-white matter differentiation. There are no areas of signal abnormality within the brain parenchyma or the posterior fossa to suggest lesion.  The sulci and ventricles are within normal limits without evidence of hydrocephalus. The midline structures are intact, the corpus callosum is within normal limits. The region of the pineal gland and the sella turcica are unremarkable. There are no space-occupying lesions in the posterior fossa. The basilar cisterns are patent. The craniocervical junction is unremarkable. The visualized portions of the orbits are within normal limits, the globes are intact. The visualized portions of the paranasal sinuses are within normal limits. The calvarium and soft tissues are unremarkable. There are 2 foci of subacute ischemia and different vascular territories. The largest ischemic area is centered in the left frontal lobe deep white matter and extends to the cortex. There is a 3 mm area of ischemia in the subcortical white matter of the right occipital lobe. EKG:  Assessment:    Active Hospital Problems    Diagnosis Date Noted    Acute cerebrovascular accident (CVA) ([facility] [address] [I63.9] 11/22/2021        Hypertension       Hyperlipidemia       Hypothyroidism       Plan:  1. Patient will be transferred to [facility] tomorrow be there at 11 AM to be done at 1300  Procedure scheduled with Dr. Faviola Mckeon at 1:00. Make sure patient is n.p.o. after midnight. Plan is to transfer the patient back after the procedure back to [facility]   2.   Potassium supplements and magnesium         Electronically signed by Wellington Titus MD on 11/23/2021 at 9:53 AM

## 2021-11-23 NOTE — PROGRESS NOTES
Spoke with ECHO- MAIK needs to have a Cardioogy Consult ( Dr Ignacio Gonzalez notified and placed Consult).  After Cardiologist (Wilber Hernandez) sees pt then he will schedule pt at [facility] Cath lab

## 2021-11-23 NOTE — PROGRESS NOTES
Dr Tan He called. MAIK scheduled at [facility] Cath lab for tomorrow. Pt to arrive at [facility] at 730am. Transport to be set up  [address]. NPO after midnight. 1150-All ambulance services available to have been contacted and all have refused transport at that time of the morning. Contacted Cath lab. Cath lab stated that Pt is scheduled for 130p to be at Cath lab.  Will attempt to reschedule     1201- [facility] to transport pt

## 2021-11-23 NOTE — CONSULTS
ID - The patient is a 79y year old, right-handed female. Consultation requested by Dr. Naun Lucas. The history was obtained from  the patient and available records. Progress notes, x-rays, lab results, and other inpatient notes were reviewed. CC -- Stroke -- HPI -- Slurred speech noted by family. Also reports of confusion. IMAGING: MR brain rev'd - 3 +DWI lesions, largest L high post frontal likely precentral gyrus; tiny +DWI high R par-occ. PMH -- hypertension, hyperlipidaemia, asthma, allergic rhinitis, hypothyroidism,  PSH -- hysterectomy, tonsillectomy & adenoidectom  Aller -- albuterol, amlodipine, metformin  Meds -- see reconciliation sheet. FHx -- HTN  SHx -- No tob No EtOH  ROS -- Negatives: malaise rash headache dizziness diplopia nausea ataxia angina palpitations cough vomiting incontinence dysuria  arthralgias weight gain anorexia alopecia nystagmus drowsiness tremor memory difficulty Positives: none. Also see HPI for elements of this section, particularly PMH, allergies, FH, ROS, documented therein. Physical Examination --  The patient is well groomed, well developed, and in no acute distress. Vital signs: reviewed as per the graphic sheet. Skin: examination demonstrated no evident lesions. HEENT: bruits absent. Heart: cardiac rhythm regular, with no murmur. Lungs: breath sounds normal throughout. .  Abdomen: grossly normal.  Extremities: no clubbing, cyanosis, or edema. Neurological Examination --  Mental status: The patient is alert. Orientation is to person, place, and time. Thought content and form is normal.  Comprehension is normal. Phonation, articulation, resonance, and prosody are normal.  CNN: Pupils are equal, 4 mm OU, round, and normally reactive to light and accommodation, both directly and consensually. Visual fields are full to confrontation. Ptosis is absent. Extra-ocular movements are full. Nystagmus is not observed. Funduscopic exam is normal. Masseters are of normal strength. Facial movement is normal. Hearing is reduced. There is no dysarthria. The gag reflex is strong bilaterally. Sternocleidomastoids and trapezii are of normal strength. The tongue in the midline. Profound hearing loss, to the point of appearing to having suffered a Wernicke's aphasia. Motor: Muscle testing including , finger abductors, biceps, triceps, deltoid, toe flexors and extensors, tibialis anterior,  triceps surae, quadriceps femoris, biceps femoris, and iliopsoases was normal. Tone is normal. Muscle bulk is normal.  Fasciculations are not seen. Pronator drift was not evident. Sensory: Sensation to to touch, temperature, and vibration was normal in the arms, legs, and face. - grossly. Difficult exam  due to hearing loss. Reflexes: biceps triceps brachioradialis patellar Achilles plantar  R 2+ 2+ 2+ 2+ 2+ flexor  L 2+ 2+ 2+ 2+ 2+ flexor  Coordination: Dysmetria and dysdiadochokinesia are absent. Tremor is absent; dystonia is absent; chorea is absent. Musculoskeletal: Scoliosis and lordosis are not evident. Impression  Cerebral infarctions, bilateral.  Hearing loss, profound, mimicking fluent (Wernicke's) aphasia. Comment Given that the patient's strokes occurred in 2 separate vascular territories, a search for a proximal source must be undertaken. MAIK, as TTE with bubble study last year was negative. Recommendations and Patient Instructions --  1. CTA head and neck, extend to aortic arch. .  2. MAIK. 3. ASA. 4. Speech therapy.

## 2021-11-23 NOTE — PROGRESS NOTES
Physical Therapy    Facility/Department: [facility] MED SURG UNIT  Initial Assessment    NAME: Clemente Ma  : 1940  MRN: [id number]    Date of Service: 2021    Discharge Recommendations:  Continue to assess pending progress        Assessment   Body structures, Functions, Activity limitations: Decreased functional mobility ; Decreased balance; Decreased coordination  Assessment: 79year old female adm with diagnosis of CVA currently demonstrating mild balance and coordination deficits and would benefit from PT to address impairments and improve overall functional mobility for safe D/C home. Treatment Diagnosis: decreased balance/coordination  Prognosis: Good  PT Education: Goals; PT Role; Plan of Care  REQUIRES PT FOLLOW UP: Yes       Patient Diagnosis(es): There were no encounter diagnoses. has a past medical history of Allergic rhinitis, Asthma, Hearing loss, Hyperlipidemia, Hypertension, Hypothyroidism, and Lichen planus. has a past surgical history that includes Hysterectomy, total abdominal and Tonsillectomy. Restrictions  Restrictions/Precautions  Restrictions/Precautions: Fall Risk  Vision/Hearing  Vision: Within Functional Limits  Hearing: Exceptions to [facility]  Hearing Exceptions: Hard of hearing/hearing concerns (LT hearing problems, hearing aides did not work per pt.)     Subjective  General  Chart Reviewed: Yes  Patient assessed for rehabilitation services?: Yes  Family / Caregiver Present: No  Diagnosis: CVA  Subjective  Subjective: Pt reports she has been deaf for several years, and had several episodes of falling in the summer but has not fallen since . Pt describes \"shaking\" all over body, dizziness and passing out which brought her into the hospital and she had a \"shaking \" episode this am while laying in bed.   Pain Screening  Patient Currently in Pain: No  Vital Signs  Patient Currently in Pain: No     Orientation  Orientation  Overall Orientation Status: Within Functional Limits  Social/Functional History  Social/Functional History  Lives With: Son  Type of Home: House  Home Layout: Multi-level, Able to Live on Main level with bedroom/bathroom  Home Access: Level entry  Bathroom Shower/Tub: Tub/Shower unit  [place] Toilet: Handicap height  Bathroom Equipment: Grab bars in shower, Shower chair, Hand-held shower  Bathroom Accessibility: Accessible  Home Equipment: [facility] Help From: Family (\"just my son\")  ADL Assistance: Independent  Homemaking Assistance: Independent  Homemaking Responsibilities: Yes  Ambulation Assistance: Independent (w/o AD)  Transfer Assistance: Independent  Active : No  Occupation: Retired  Type of occupation: Nurses aide,   Leisure & Hobbies: Painting- rock painting  IADL Comments: Cat at home    Objective    Observation/Palpation  Observation: IV RUE and LUE    AROM RLE (degrees)  RLE AROM: WFL  AROM LLE (degrees)  LLE AROM : WFL  Strength RLE  Strength RLE: WFL  Strength LLE  Strength LLE: WFL  Strength RUE  Comment: See OT eval  Strength LUE  Comment: /see OT eval  Coordination  Finger to Nose:  (Slow pace with slight decreased coordination noted)  Heel to Lott:  (Slow with slight difficulty and mod vcs for technique)  Sensation  Overall Sensation Status: WFL     Transfers  Sit to Stand: Stand by assistance; Supervision  Stand to sit: Supervision  Ambulation  Ambulation?: Yes  Ambulation 1  Surface: level tile  Device: No Device  Assistance: Supervision; Stand by assistance  Quality of Gait: good pace, occasional slight veering R or L no major LOB, near equal step and stride length  Gait Deviations: Deviated path  Distance: 150 feet     Balance  Sitting - Static: Good  Sitting - Dynamic: Good  Standing - Static: Fair; +  Standing - Dynamic: Fair  Comments: Pt stood in bathroom and cleaned up and was able to wash her body and face wile standing with large LUIS and no LOB.  Slight decreased coordination noted with heel to shin test

## 2021-11-23 NOTE — H&P
pls see H&P per dr Jakub Ayala at [facility]OG[facility] 11/22    No changes or addendum to current H&P

## 2021-11-23 NOTE — PROGRESS NOTES
Patient awake in bed this morning. Patient anxious stated \"God is mad at me, I am going to die! \" Reassurance to patient, BP checked and physician notified. This nurse stayed with patient until patient settled down and relaxed. Breathing exercises encouraged. Patient thankful that nurse stayed with her. Morning medications provided and assessment completed.  Call light and belongings within reach

## 2021-11-23 NOTE — PROGRESS NOTES
words; Increased volume    Reading Comprehension  Reading Status: Within functional limits    Expression  Primary Mode of Expression: Verbal    Verbal Expression  Verbal Expression: Exceptions to functional limits  Conversation: Mild  Interfering Components: Paraphasia    Written Expression  Dominant Hand: Right  Written Expression: Within Functional Limits    Motor Speech  Motor Speech:  Within Functional Limits    Pragmatics/Social Functioning  Pragmatics: Within functional limits    Cognition:      Orientation  Overall Orientation Status: Within Functional Limits  Attention  Attention: Within Functional Limits  Memory  Memory: Exceptions to [facility]  Short-term Memory: Mild  Problem Solving  Problem Solving: Within Functional Limits  Numeric Reasoning  Numeric Reasoning: Within Functional Limits  Safety/Judgement  Safety/Judgement: Within Functional Limits    Additional Assessments:   WAB bedside - WFL          Prognosis:  Speech Therapy Prognosis  Prognosis: Excellent  Prognosis Considerations: Age; Previous Level of Function  Individuals consulted  Consulted and agree with results and recommendations: Patient; RN    Education:     Safety Devices in place: Yes  Type of devices: Left in chair; Call light within reach    Therapy Time:   Individual Concurrent Group Co-treatment   Time In 1135         Time Out 1210         Minutes [address] [place], [place]  11/23/2021 12:29 PM

## 2021-11-24 ENCOUNTER — HOSPITAL ENCOUNTER (OUTPATIENT)
Dept: CARDIAC CATH/INVASIVE PROCEDURES | Age: 81
Discharge: HOME | End: 2021-11-24
Payer: MEDICARE

## 2021-11-24 VITALS
DIASTOLIC BLOOD PRESSURE: 79 MMHG | RESPIRATION RATE: 11 BRPM | HEART RATE: 76 BPM | SYSTOLIC BLOOD PRESSURE: 159 MMHG | OXYGEN SATURATION: 99 %

## 2021-11-24 LAB
ALBUMIN SERPL-MCNC: 2.8 G/DL (ref 3.5–4.6)
ALP BLD-CCNC: 45 U/L (ref 40–130)
ALT SERPL-CCNC: 13 U/L (ref 0–33)
ANION GAP SERPL CALCULATED.3IONS-SCNC: 7 MEQ/L (ref 9–15)
AST SERPL-CCNC: 29 U/L (ref 0–35)
BASOPHILS ABSOLUTE: 0 K/UL (ref 0–0.1)
BASOPHILS RELATIVE PERCENT: 0.3 % (ref 0.1–1.2)
BILIRUB SERPL-MCNC: <0.2 MG/DL (ref 0.2–0.7)
BUN BLDV-MCNC: 8 MG/DL (ref 8–23)
CALCIUM SERPL-MCNC: 8 MG/DL (ref 8.5–9.9)
CHLORIDE BLD-SCNC: 108 MEQ/L (ref 95–107)
CHOLESTEROL, TOTAL: 110 MG/DL (ref 0–199)
CO2: 25 MEQ/L (ref 20–31)
CREAT SERPL-MCNC: 0.86 MG/DL (ref 0.5–0.9)
EOSINOPHILS ABSOLUTE: 0.1 K/UL (ref 0–0.4)
EOSINOPHILS RELATIVE PERCENT: 1.9 % (ref 0.7–5.8)
GFR AFRICAN AMERICAN: >60
GFR NON-AFRICAN AMERICAN: >60
GLOBULIN: 2.7 G/DL (ref 2.3–3.5)
GLUCOSE BLD-MCNC: 112 MG/DL (ref 70–99)
HCT VFR BLD CALC: 29.6 % (ref 37–47)
HDLC SERPL-MCNC: 37 MG/DL (ref 40–59)
HEMOGLOBIN: 11.1 G/DL (ref 11.2–15.7)
IMMATURE GRANULOCYTES #: 0 K/UL
IMMATURE GRANULOCYTES %: 0.2 %
LDL CHOLESTEROL CALCULATED: 54 MG/DL (ref 0–129)
LV EF: 60 %
LVEF MODALITY: NORMAL
LYMPHOCYTES ABSOLUTE: 1 K/UL (ref 1.2–3.7)
LYMPHOCYTES RELATIVE PERCENT: 14.8 %
MAGNESIUM: 1.8 MG/DL (ref 1.7–2.4)
MCH RBC QN AUTO: 33.3 PG (ref 25.6–32.2)
MCHC RBC AUTO-ENTMCNC: 37.5 % (ref 32.2–35.5)
MCV RBC AUTO: 88.9 FL (ref 79.4–94.8)
MONOCYTES ABSOLUTE: 0.7 K/UL (ref 0.2–0.9)
MONOCYTES RELATIVE PERCENT: 10.2 % (ref 4.7–12.5)
NEUTROPHILS ABSOLUTE: 4.7 K/UL (ref 1.6–6.1)
NEUTROPHILS RELATIVE PERCENT: 72.6 % (ref 34–71.1)
ORGANISM: ABNORMAL
PDW BLD-RTO: 14.3 % (ref 11.7–14.4)
PLATELET # BLD: 232 K/UL (ref 182–369)
POTASSIUM SERPL-SCNC: 4.3 MEQ/L (ref 3.4–4.9)
RBC # BLD: 3.33 M/UL (ref 3.93–5.22)
SODIUM BLD-SCNC: 140 MEQ/L (ref 135–144)
TOTAL PROTEIN: 5.5 G/DL (ref 6.3–8)
TRIGL SERPL-MCNC: 95 MG/DL (ref 0–150)
URINE CULTURE, ROUTINE: ABNORMAL
WBC # BLD: 6.5 K/UL (ref 4–10)

## 2021-11-24 PROCEDURE — 80061 LIPID PANEL: CPT

## 2021-11-24 PROCEDURE — 6360000002 HC RX W HCPCS: Performed by: INTERNAL MEDICINE

## 2021-11-24 PROCEDURE — 93325 DOPPLER ECHO COLOR FLOW MAPG: CPT

## 2021-11-24 PROCEDURE — 36415 COLL VENOUS BLD VENIPUNCTURE: CPT

## 2021-11-24 PROCEDURE — 80053 COMPREHEN METABOLIC PANEL: CPT

## 2021-11-24 PROCEDURE — 1210000000 HC MED SURG R&B

## 2021-11-24 PROCEDURE — 94640 AIRWAY INHALATION TREATMENT: CPT

## 2021-11-24 PROCEDURE — 93321 DOPPLER ECHO F-UP/LMTD STD: CPT

## 2021-11-24 PROCEDURE — 6370000000 HC RX 637 (ALT 250 FOR IP): Performed by: INTERNAL MEDICINE

## 2021-11-24 PROCEDURE — 83735 ASSAY OF MAGNESIUM: CPT

## 2021-11-24 PROCEDURE — 2580000003 HC RX 258: Performed by: INTERNAL MEDICINE

## 2021-11-24 PROCEDURE — 93312 ECHO TRANSESOPHAGEAL: CPT

## 2021-11-24 PROCEDURE — 85025 COMPLETE CBC W/AUTO DIFF WBC: CPT

## 2021-11-24 RX ORDER — HYDROCHLOROTHIAZIDE 25 MG/1
25 TABLET ORAL DAILY
Status: DISCONTINUED | OUTPATIENT
Start: 2021-11-24 | End: 2021-11-25 | Stop reason: HOSPADM

## 2021-11-24 RX ORDER — SODIUM CHLORIDE 0.9 % (FLUSH) 0.9 %
5-40 SYRINGE (ML) INJECTION PRN
Status: DISCONTINUED | OUTPATIENT
Start: 2021-11-24 | End: 2021-11-25 | Stop reason: HOSPADM

## 2021-11-24 RX ORDER — SODIUM CHLORIDE 9 MG/ML
INJECTION, SOLUTION INTRAVENOUS CONTINUOUS
Status: DISCONTINUED | OUTPATIENT
Start: 2021-11-24 | End: 2021-11-25 | Stop reason: HOSPADM

## 2021-11-24 RX ORDER — MIDAZOLAM HYDROCHLORIDE 1 MG/ML
INJECTION INTRAMUSCULAR; INTRAVENOUS
Status: COMPLETED | OUTPATIENT
Start: 2021-11-24 | End: 2021-11-24

## 2021-11-24 RX ORDER — SODIUM CHLORIDE 9 MG/ML
25 INJECTION, SOLUTION INTRAVENOUS PRN
Status: DISCONTINUED | OUTPATIENT
Start: 2021-11-24 | End: 2021-11-25 | Stop reason: HOSPADM

## 2021-11-24 RX ORDER — SODIUM CHLORIDE 0.9 % (FLUSH) 0.9 %
5-40 SYRINGE (ML) INJECTION EVERY 12 HOURS SCHEDULED
Status: DISCONTINUED | OUTPATIENT
Start: 2021-11-24 | End: 2021-11-25 | Stop reason: HOSPADM

## 2021-11-24 RX ORDER — SODIUM CHLORIDE 9 MG/ML
INJECTION, SOLUTION INTRAVENOUS
Status: DISCONTINUED
Start: 2021-11-24 | End: 2021-11-25 | Stop reason: HOSPADM

## 2021-11-24 RX ORDER — LISINOPRIL 20 MG/1
40 TABLET ORAL DAILY
Status: DISCONTINUED | OUTPATIENT
Start: 2021-11-24 | End: 2021-11-25 | Stop reason: HOSPADM

## 2021-11-24 RX ADMIN — LEVOTHYROXINE SODIUM 75 MCG: 75 TABLET ORAL at 06:36

## 2021-11-24 RX ADMIN — Medication 10 ML: at 20:11

## 2021-11-24 RX ADMIN — HYDRALAZINE HYDROCHLORIDE 10 MG: 20 INJECTION, SOLUTION INTRAMUSCULAR; INTRAVENOUS at 17:57

## 2021-11-24 RX ADMIN — FENTANYL CITRATE 25 MCG: 50 INJECTION, SOLUTION INTRAMUSCULAR; INTRAVENOUS at 14:22

## 2021-11-24 RX ADMIN — HYDRALAZINE HYDROCHLORIDE 10 MG: 20 INJECTION, SOLUTION INTRAMUSCULAR; INTRAVENOUS at 09:54

## 2021-11-24 RX ADMIN — METOPROLOL SUCCINATE 25 MG: 25 TABLET, FILM COATED, EXTENDED RELEASE ORAL at 06:46

## 2021-11-24 RX ADMIN — FENTANYL CITRATE 25 MCG: 50 INJECTION, SOLUTION INTRAMUSCULAR; INTRAVENOUS at 14:12

## 2021-11-24 RX ADMIN — HYDROCHLOROTHIAZIDE 25 MG: 25 TABLET ORAL at 08:26

## 2021-11-24 RX ADMIN — BUDESONIDE 250 MCG: 0.5 SUSPENSION RESPIRATORY (INHALATION) at 06:10

## 2021-11-24 RX ADMIN — MIDAZOLAM HYDROCHLORIDE 1 MG: 1 INJECTION, SOLUTION INTRAMUSCULAR; INTRAVENOUS at 14:20

## 2021-11-24 RX ADMIN — CEFTRIAXONE 1000 MG: 1 INJECTION, POWDER, FOR SOLUTION INTRAMUSCULAR; INTRAVENOUS at 22:28

## 2021-11-24 RX ADMIN — LISINOPRIL 20 MG: 20 TABLET ORAL at 06:45

## 2021-11-24 RX ADMIN — MIDAZOLAM HYDROCHLORIDE 1 MG: 1 INJECTION, SOLUTION INTRAMUSCULAR; INTRAVENOUS at 14:12

## 2021-11-24 RX ADMIN — LISINOPRIL 20 MG: 20 TABLET ORAL at 08:26

## 2021-11-24 RX ADMIN — MIDAZOLAM HYDROCHLORIDE 2 MG: 1 INJECTION, SOLUTION INTRAMUSCULAR; INTRAVENOUS at 14:07

## 2021-11-24 RX ADMIN — FENTANYL CITRATE 50 MCG: 50 INJECTION, SOLUTION INTRAMUSCULAR; INTRAVENOUS at 14:07

## 2021-11-24 RX ADMIN — BUDESONIDE 250 MCG: 0.5 SUSPENSION RESPIRATORY (INHALATION) at 18:23

## 2021-11-24 RX ADMIN — Medication 10 ML: at 08:24

## 2021-11-24 ASSESSMENT — PAIN SCALES - GENERAL: PAINLEVEL_OUTOF10: 0

## 2021-11-24 NOTE — OP NOTE
Section of Cardiology  Adult Brief Procedure Note        Procedure(s):  MAIK with bubble study    Pre-operative Diagnosis:  CVA    H&P Status: Completed and reviewed.      Post-operative Diagnosis:  CVA    Findings: see full report  Visual EF 65%  No PFO or ASD, bubble study negative  Mild Aortic valve calcification  Mild AR  Mild atheromatous plaque in the ascending aortic arch and descending aorta  Rest of the valves without hemodynamic evidence of significant valve disease  No left atrial appendage clot    Complications:  none    Primary Proceduralist:  Watson Coombs MD         Full procedure note to follow None

## 2021-11-24 NOTE — PROGRESS NOTES
Physical Therapy  Facility/Department: [facility] MED SURG UNIT  Daily Treatment Note  NAME: Juan Lassiter  : 1940  MRN: [id number]    Date of Service: 2021    Discharge Recommendations:  Continue to assess pending progress        Assessment Pt JOSE ALFREDO; transported to [facility] [place] for procedure, not seen for PT this date. Patient Diagnosis(es): There were no encounter diagnoses. has a past medical history of Allergic rhinitis, Asthma, Hearing loss, Hyperlipidemia, Hypertension, Hypothyroidism, and Lichen planus. has a past surgical history that includes Hysterectomy, total abdominal and Tonsillectomy.     Restrictions  Restrictions/Precautions  Restrictions/Precautions: Fall Risk  Subjective              Orientation     Cognition      Objective                                           G-Code     OutComes Score                                                     AM-PAC Score             Goals  Short term goals  Time Frame for Short term goals: 3 days  Short term goal 1: I bed mobility  Short term goal 2: Pt demonstrate safe and I transfers  Short term goal 3: Pt ambulate 200 feet safely and I demonstrating good balance to allow safe amb at home  Short term goal 4: Pt I with HEP    Plan    Plan  Times per week: 5-7  Times per day:  (1-2)  Current Treatment Recommendations: Strengthening, ROM, Balance Training, Functional Mobility Training, Gait Training, Neuromuscular Re-education, Safety Education & Training, Home Exercise Program     Therapy Time   Individual Concurrent Group Co-treatment   Time In  1:00 pm         Time Out  1:05 pm         Minutes  5                 Malou Oneill, PTA

## 2021-11-24 NOTE — PROGRESS NOTES
Report called to Arpan Reaves RN at [facility]. [facility] ambulance called for return to [facility]. ETA 1700.

## 2021-11-24 NOTE — PROGRESS NOTES
Hospitalist Progress Note      PCP: Belkis Frye MD    Date of Admission: 11/22/2021    Chief Complaint: weakness, aphasia     Subjective: pt in chair, awake/alert, looks comfortable     Medications:  Reviewed    Infusion Medications    sodium chloride       Scheduled Medications    lisinopril  40 mg Oral Daily    hydroCHLOROthiazide  25 mg Oral Daily    magnesium oxide  800 mg Oral BID    influenza virus vaccine  0.5 mL IntraMUSCular Prior to discharge    atorvastatin  20 mg Oral Nightly    levothyroxine  75 mcg Oral Daily    sodium chloride flush  5-40 mL IntraVENous 2 times per day    enoxaparin  40 mg SubCUTAneous Daily    cefTRIAXone (ROCEPHIN) IV  1,000 mg IntraVENous Q24H    metoprolol succinate  25 mg Oral Daily    budesonide  250 mcg Nebulization BID    aspirin  81 mg Oral Daily     PRN Meds: LORazepam, traMADol, sodium chloride flush, sodium chloride, ondansetron **OR** ondansetron, polyethylene glycol, acetaminophen **OR** acetaminophen, hydrALAZINE    No intake or output data in the 24 hours ending 11/24/21 0810    Exam:    BP (!) 174/82   Pulse 71   Temp 97.5 °F (36.4 °C)   Resp 16   Ht 5' 4\" (1.626 m)   Wt 131 lb (59.4 kg)   SpO2 100%   BMI 22.49 kg/m²     General appearance: No apparent distress, appears stated age and cooperative. HEENT: Pupils equal, round, and reactive to light. Conjunctivae/corneas clear. Neck: Supple, with full range of motion. No jugular venous distention. Trachea midline. Respiratory:  Normal respiratory effort. Clear to auscultation, bilaterally without Rales/Wheezes/Rhonchi. Cardiovascular: Regular rate and rhythm with normal S1/S2 without murmurs, rubs or gallops. Abdomen: Soft, non-tender, non-distended with normal bowel sounds. Musculoskeletal: No clubbing, cyanosis or edema bilaterally. Full range of motion without deformity. Skin: Skin color, texture, turgor normal.  No rashes or lesions.   Neurologic:  Neurovascularly intact without any iterative reconstruction, and/or weight based    dosing when appropriate to reduce radiation dose to as low as reasonably achievable. NASCET Criteria were utilized      FINDINGS CTA NECK:      Aortic Arch: The visualized portions of the aortic arch and proximal arch vessels demonstrates no focal stenosis aneurysm or dissection. Carotids: The common carotid arteries, proximal internal and external carotid arteries are normal in course and caliber. There is arteriosclerosis and calcifications of the bilateral carotid arteries. The visualized vessels demonstrate increased    tortuosity which is likely secondary to chronic hypertension. Right  Proximal Internal Carotid Stenosis (% by NASCET Criteria): There is no hemodynamically significant stenosis. Left  Proximal Internal Carotid Stenosis (% by NASCET Criteria): There is arteriosclerosis of the proximal left internal carotid artery with resulting 60% stenosis. Vertebral Arteries:      Patency: The vertebral arteries are well visualized to the level of the basilar artery. There is no focal stenosis aneurysm or dissection. Vertebral arteries are codominant. IMPRESSION: There is increased tortuosity of the visualized vessels and atherosclerosis. There are no hemodynamically significant stenoses of the right internal carotid artery. There is 60% stenosis of proximal left internal carotid artery. CTA HEAD W WO CONTRAST   Final Result   The major intracranial arterial structures are patent without high-grade stenosis, large vessel cut off, or aneurysm.          EXAMINATION: CTA HEAD W WO CONTRAST, CTA NECK W CONTRAST      DATE AND TIME:11/23/2021 9:25 AM      CLINICAL HISTORY: Stroke symptoms   stroke in 2 vascular territories        COMPARISON: None      TECHNIQUE: Helical CTA of the neck was performed from the aortic arch to the foramen magnum following the uneventful intravenous administration of Status: Full Code    PT/OT Eval Status: done    Dispo - CVA with different vascular territories- appreciate neuro/cardio consults, MAIK today AM per cardiology, ASA/statin per neurology service  Wernicke aphasia due to above- supportive care.  PT on case  UTI- atbs initiated, follow up with UC report  HTN- meds adjusted, follow up clinically  Hypokalemia/hypomagnesemia- replaced  Medically stable for acute admission at [facility], will follow up along with consultants regarding further recommendations        Electronically signed by Melissa Saldana MD on 11/24/2021 at 8:10 AM

## 2021-11-24 NOTE — PROGRESS NOTES
Chart reviewed. Patient was admitted to [facility] with a diagnosis of acute CVA. Patient was evaluated by PT/OT. OT recommending home with prn assist upon DC and PT notes continue to assess. This  attempted to meet with patient to discuss DC planning and help at home needs. However, patient is currently at [facility] for cardiac work-up. SS to continue to attempt to meet with patient.

## 2021-11-24 NOTE — PROGRESS NOTES
Occupational Therapy  Facility/Department: [facility] MED SURG UNIT  Daily Treatment Note  NAME: Matt Sebastian  : 1940  MRN: [id number]    Date of Service: 2021    Attempted this afternoon to see pt for OT. Pt was not in room so consulted with erin whom informed pt is getting cardiac cath procedure and will not be back \"for hours and will not be able to participate in any therapy even when she does return today. \" No OT today.     Therapy Time   Individual Concurrent Group Co-treatment   Time In  0         Time Out  0         Minutes  [place]ryn, [place]

## 2021-11-24 NOTE — PROGRESS NOTES
Nutrition Assessment     Type and Reason for Visit: Positive Nutrition Screen (Admitted for AMS, CVA. Reported 34# weight loss and decreased appetite.)    Nutrition Recommendations/Plan: Follow per LOS protocol. Please consult if full assessment desired. Nutrition Assessment:  Patient n/a to  interview. Chart reviewed and discussed nutrition triggers with staff. Patient told SLP she was intentionally eating less to lose weight. Chart review shows no significant weight changes over past 6 months. Patient currently NPO for testing, intake n/s to review. Will follow per LOS protocol. Current Nutrition Therapies:    Diet NPO for testing. Cleared by SLP for regular diet with thin liquids.     Anthropometric Measures:  · Height: 5' 4\" (162.6 cm)  · Current Body Wt: 130 lb 15.3 oz (59.4 kg)   · BMI: 22.5    Electronically signed by Duane Bass RD, USHA on 11/24/21 at 1:04 PM EST

## 2021-11-25 VITALS
SYSTOLIC BLOOD PRESSURE: 167 MMHG | BODY MASS INDEX: 22.36 KG/M2 | TEMPERATURE: 97.3 F | DIASTOLIC BLOOD PRESSURE: 72 MMHG | OXYGEN SATURATION: 100 % | RESPIRATION RATE: 16 BRPM | HEIGHT: 64 IN | HEART RATE: 68 BPM | WEIGHT: 131 LBS

## 2021-11-25 LAB
ALBUMIN SERPL-MCNC: 3 G/DL (ref 3.5–4.6)
ALP BLD-CCNC: 52 U/L (ref 40–130)
ALT SERPL-CCNC: 15 U/L (ref 0–33)
ANION GAP SERPL CALCULATED.3IONS-SCNC: 9 MEQ/L (ref 9–15)
AST SERPL-CCNC: 29 U/L (ref 0–35)
BASOPHILS ABSOLUTE: 0 K/UL (ref 0–0.1)
BASOPHILS RELATIVE PERCENT: 0.2 % (ref 0.1–1.2)
BILIRUB SERPL-MCNC: 0.3 MG/DL (ref 0.2–0.7)
BUN BLDV-MCNC: 6 MG/DL (ref 8–23)
CALCIUM SERPL-MCNC: 8.7 MG/DL (ref 8.5–9.9)
CHLORIDE BLD-SCNC: 105 MEQ/L (ref 95–107)
CO2: 27 MEQ/L (ref 20–31)
CREAT SERPL-MCNC: 0.86 MG/DL (ref 0.5–0.9)
EOSINOPHILS ABSOLUTE: 0.1 K/UL (ref 0–0.4)
EOSINOPHILS RELATIVE PERCENT: 2 % (ref 0.7–5.8)
GFR AFRICAN AMERICAN: >60
GFR NON-AFRICAN AMERICAN: >60
GLOBULIN: 2.9 G/DL (ref 2.3–3.5)
GLUCOSE BLD-MCNC: 106 MG/DL (ref 70–99)
HCT VFR BLD CALC: 29.6 % (ref 37–47)
HEMOGLOBIN: 10.9 G/DL (ref 11.2–15.7)
IMMATURE GRANULOCYTES #: 0 K/UL
IMMATURE GRANULOCYTES %: 0.2 %
LYMPHOCYTES ABSOLUTE: 0.8 K/UL (ref 1.2–3.7)
LYMPHOCYTES RELATIVE PERCENT: 17.4 %
MAGNESIUM: 1.8 MG/DL (ref 1.7–2.4)
MCH RBC QN AUTO: 32.9 PG (ref 25.6–32.2)
MCHC RBC AUTO-ENTMCNC: 36.8 % (ref 32.2–35.5)
MCV RBC AUTO: 89.4 FL (ref 79.4–94.8)
MONOCYTES ABSOLUTE: 0.4 K/UL (ref 0.2–0.9)
MONOCYTES RELATIVE PERCENT: 8.7 % (ref 4.7–12.5)
NEUTROPHILS ABSOLUTE: 3.2 K/UL (ref 1.6–6.1)
NEUTROPHILS RELATIVE PERCENT: 71.5 % (ref 34–71.1)
PDW BLD-RTO: 14 % (ref 11.7–14.4)
PLATELET # BLD: 76 K/UL (ref 182–369)
PLATELET SLIDE REVIEW: ABNORMAL
POTASSIUM SERPL-SCNC: 4.4 MEQ/L (ref 3.4–4.9)
RBC # BLD: 3.31 M/UL (ref 3.93–5.22)
SODIUM BLD-SCNC: 141 MEQ/L (ref 135–144)
TOTAL PROTEIN: 5.9 G/DL (ref 6.3–8)
WBC # BLD: 4.5 K/UL (ref 4–10)

## 2021-11-25 PROCEDURE — 80053 COMPREHEN METABOLIC PANEL: CPT

## 2021-11-25 PROCEDURE — 36415 COLL VENOUS BLD VENIPUNCTURE: CPT

## 2021-11-25 PROCEDURE — 93325 DOPPLER ECHO COLOR FLOW MAPG: CPT | Performed by: INTERNAL MEDICINE

## 2021-11-25 PROCEDURE — 6360000002 HC RX W HCPCS: Performed by: INTERNAL MEDICINE

## 2021-11-25 PROCEDURE — 93312 ECHO TRANSESOPHAGEAL: CPT | Performed by: INTERNAL MEDICINE

## 2021-11-25 PROCEDURE — 94640 AIRWAY INHALATION TREATMENT: CPT

## 2021-11-25 PROCEDURE — 85025 COMPLETE CBC W/AUTO DIFF WBC: CPT

## 2021-11-25 PROCEDURE — 93320 DOPPLER ECHO COMPLETE: CPT | Performed by: INTERNAL MEDICINE

## 2021-11-25 PROCEDURE — 83735 ASSAY OF MAGNESIUM: CPT

## 2021-11-25 PROCEDURE — 6370000000 HC RX 637 (ALT 250 FOR IP): Performed by: INTERNAL MEDICINE

## 2021-11-25 PROCEDURE — 2580000003 HC RX 258: Performed by: INTERNAL MEDICINE

## 2021-11-25 RX ORDER — LISINOPRIL 40 MG/1
40 TABLET ORAL DAILY
Qty: 30 TABLET | Refills: 3 | Status: ON HOLD | OUTPATIENT
Start: 2021-11-26 | End: 2022-02-15 | Stop reason: HOSPADM

## 2021-11-25 RX ORDER — ASPIRIN 81 MG/1
81 TABLET, CHEWABLE ORAL DAILY
Qty: 30 TABLET | Refills: 3 | Status: SHIPPED | OUTPATIENT
Start: 2021-11-26 | End: 2022-10-24 | Stop reason: ALTCHOICE

## 2021-11-25 RX ADMIN — BUDESONIDE 250 MCG: 0.5 SUSPENSION RESPIRATORY (INHALATION) at 05:11

## 2021-11-25 RX ADMIN — HYDROCHLOROTHIAZIDE 25 MG: 25 TABLET ORAL at 08:25

## 2021-11-25 RX ADMIN — Medication 10 ML: at 08:26

## 2021-11-25 RX ADMIN — LISINOPRIL 40 MG: 20 TABLET ORAL at 08:25

## 2021-11-25 RX ADMIN — MAGNESIUM OXIDE 800 MG: 400 TABLET ORAL at 08:25

## 2021-11-25 RX ADMIN — METOPROLOL SUCCINATE 25 MG: 25 TABLET, FILM COATED, EXTENDED RELEASE ORAL at 08:25

## 2021-11-25 RX ADMIN — LEVOTHYROXINE SODIUM 75 MCG: 75 TABLET ORAL at 05:47

## 2021-11-25 NOTE — PROGRESS NOTES
Pt's son called back and will be in to transport pt around 2pm. Pt relieved we were able to contact him. Verbalized understanding of discharge instructions and medications.

## 2021-11-25 NOTE — DISCHARGE INSTR - COC
Continuity of Care Form    Patient Name: Ragini Shelley   :  1940  MRN:  [id number]    Admit date:  2021  Discharge date:  ***    Code Status Order: Prior   Advance Directives:      Admitting Physician:  oTva Cormier MD  PCP: Jessica Campbell MD    Discharging Nurse: [facility] Unit/Room#: 1054/7940-49  Discharging Unit Phone Number: ***    Emergency Contact:   Extended Emergency Contact Information  Primary Emergency Contact: Delmer Michaels [address] Phone: [phone]  Mobile Phone: [phone]  Relation: Child  Secondary Emergency Contact: Bj Francois  Mobile Phone: [phone]  Relation: Child    Past Surgical History:  Past Surgical History:   Procedure Laterality Date    HYSTERECTOMY, TOTAL ABDOMINAL      TONSILLECTOMY         Immunization History:   Immunization History   Administered Date(s) Administered    COVID-19, RAFITA Avalos, 30mcg/0.3mL 2021, 09/15/2021    Influenza, High Dose (Fluzone 65 yrs and older) 2018       Active Problems:  Patient Active Problem List   Diagnosis Code    Hypothyroidism E03.9    Allergic rhinitis J30.9    Hypertension I10    Hyperlipidemia C43.2    Lichen planus G34.8    Hearing loss H91.90    Asthma J45.909    TIN (acute kidney injury) ([facility] [address] N17.9    Syncope R55    E. coli UTI N39.0, B96.20    AMS (altered mental status) R41.82    Cerebrovascular accident (CVA) ([email or web address] I63.9       Isolation/Infection:   Isolation            No Isolation          Patient Infection Status       None to display            Nurse Assessment:  Last Vital Signs: BP (!) 167/72 Comment: nurse aware  Pulse 68   Temp 97.3 °F (36.3 °C) (Oral)   Resp 16   Ht 5' 4\" (1.626 m)   Wt 131 lb (59.4 kg)   SpO2 100%   BMI 22.49 kg/m²     Last documented pain score (0-10 scale): Pain Level: 0  Last Weight:   Wt Readings from Last 1 Encounters:   21 131 lb (59.4 kg)     Mental Status:  {IP PT MENTAL STATUS:}    IV Access:  [facility] IV BMBGHZ:183303556}    Nursing Mobility/ADLs:  Walking   {CHP DME SBUD:046949457}  Transfer  {CHP DME WOXM:742216678}  Bathing  {CHP DME OIWX:159565605}  Dressing  {CHP DME NZQS:870677122}  Toileting  {CHP DME OBCQ:314838447}  Feeding  {CHP DME QDVN:118442387}  Med Admin  {CHP DME JRAT:718952974}  Med Delivery   {[facility] MED Delivery:618946446}    Wound Care Documentation and Therapy:        Elimination:  Continence: Bowel: {YES / CJ:58358}  Bladder: {YES / KO:79927}  Urinary Catheter: {Urinary Catheter:020953050}   Colostomy/Ileostomy/Ileal Conduit: {YES / MO:71464}       Date of Last BM: ***  No intake or output data in the 24 hours ending 21 0919  No intake/output data recorded.     Safety Concerns:     [address] [employer] Safety Concerns:175080607}    Impairments/Disabilities:      [address] Impairments/Disabilities:139882695}    Nutrition Therapy:  Current Nutrition Therapy:   [address] [employer] Diet List:858303238}    Routes of Feeding: {[facility] DME Other Feedings:411591211}  Liquids: {Slp liquid thickness:06520}  Daily Fluid Restriction: {CHP DME Yes amt example:014586719}  Last Modified Barium Swallow with Video (Video Swallowing Test): {Done Not Done IVBK:881273481}    Treatments at the Time of Hospital Discharge:   Respiratory Treatments: ***  Oxygen Therapy:  {Therapy; copd oxygen:15576}  Ventilator:    {[facility] Vent GNLL:590292352}    Rehab Therapies: {THERAPEUTIC INTERVENTION:0655518535}  Weight Bearing Status/Restrictions: [address] [employer] Weight Bearin}  Other Medical Equipment (for information only, NOT a DME order):  {EQUIPMENT:562835368}  Other Treatments: ***    Patient's personal belongings (please select all that are sent with patient):  {[facility] DME Belongings:133459918}    RN SIGNATURE:  {Esignature:978214279}    CASE MANAGEMENT/SOCIAL WORK SECTION    Inpatient Status Date: ***    Readmission Risk Assessment Score:  Readmission Risk              Risk of Unplanned Readmission:  17           Discharging to Facility/ Agency Name:   Address:  Phone:  Fax:    Dialysis Facility (if applicable)   Name:  Address:  Dialysis Schedule:  Phone:  Fax:    / signature: {Esignature:057100912}    PHYSICIAN SECTION    Prognosis: Good    Condition at Discharge: Stable    Rehab Potential (if transferring to Rehab): Good    Recommended Labs or Other Treatments After Discharge:     Physician Certification: I certify the above information and transfer of [facility]  is necessary for the continuing treatment of the diagnosis listed and that she requires Home Care for greater 30 days.      Update Admission H&P: No change in H&P    PHYSICIAN SIGNATURE:  Electronically signed by Juana Ortiz MD on 11/25/21 at 9:20 AM EST

## 2021-11-25 NOTE — PROGRESS NOTES
Attempted to reach pt's son Jose Gibson multiple times. Phone goes straight to voicemail. Left a general message asking for call back. [employer] service is closed until Saturday. Attempted to call [employer], the person who answered said they are technically closed today but he will attempt to find a  and give us a return call. Will continue to monitor.

## 2021-11-25 NOTE — DISCHARGE SUMMARY
Discharge Summary    Patient:  Hilda Gayle  YOB: 1940    MRN: [id number]   Acct: [de-identified]    Primary Care Physician: Brandi Irving MD    Admit date:  11/22/2021    Discharge date:   11/25/21      Discharge Diagnoses:   <principal problem not specified>  Active Problems:    Cerebrovascular accident (CVA) [facility])  Resolved Problems:    * No resolved hospital problems. *      Admitted for: [facility] Course: patient was admitted with CVA, Wernicke aphasia, was found CVA in different vascular territories. Was evaluated by neurology and cardiology. Underwent MAIK which were WNL. After completing her acute stay will be DC home with [facility].  Will follow up with neurology service as outpatient       Consultants:  Neuro/cardio    Discharge Medications:       Medication List      START taking these medications    aspirin 81 MG chewable tablet  Take 1 tablet by mouth daily  Start taking on: November 26, 2021        CHANGE how you take these medications    lisinopril 40 MG tablet  Commonly known as: PRINIVIL;ZESTRIL  Take 1 tablet by mouth daily  Start taking on: November 26, 2021  What changed:   · medication strength  · how much to take        CONTINUE taking these medications    atorvastatin 20 MG tablet  Commonly known as: LIPITOR     Flovent HFA 44 MCG/ACT inhaler  Generic drug: fluticasone     hydrALAZINE 100 MG tablet  Commonly known as: APRESOLINE     levothyroxine 75 MCG tablet  Commonly known as: SYNTHROID     metoprolol succinate 100 MG extended release tablet  Commonly known as: TOPROL XL     Tears Again Advanced Eyelid Soln           Where to Get Your Medications      These medications were sent to [address] [place] [address], [address] [place] [address]  [address]    Phone: [phone]   · aspirin 81 MG chewable tablet  · lisinopril 40 MG tablet         Physical Exam:    Vitals:  Vitals:    11/24/21 1433 11/24/21 1754 11/25/21 0511 11/25/21 0606   BP:  (!) 167/80  (!) 167/72   Pulse:  65  68   Resp:  16  16   Temp:  98.1 °F (36.7 °C)  97.3 °F (36.3 °C)   TempSrc:  Oral  Oral   SpO2:  97% 98% 100%   Weight:       Height: 5' 4\" (1.626 m)        Weight: Weight: 131 lb (59.4 kg)     24 hour intake/output:No intake or output data in the 24 hours ending 11/25/21 7476    General appearance - alert, well appearing, and in no distress  Chest - clear to auscultation, no wheezes, rales or rhonchi, symmetric air entry  Heart - normal rate, regular rhythm, normal S1, S2, no murmurs, rubs, clicks or gallops  Abdomen - soft, nontender, nondistended, no masses or organomegaly  Obese: No; Protuberant: No   Neurological - alert, oriented, normal speech, no focal findings or movement disorder noted  Extremities - peripheral pulses normal, no pedal edema, no clubbing or cyanosis  Skin - normal coloration and turgor, no rashes, no suspicious skin lesions noted        Radiology reports as per the Radiologist  Radiology: No results found.      Results for orders placed or performed during the hospital encounter of 11/22/21   CBC Auto Differential   Result Value Ref Range    WBC 5.1 4.0 - 10.0 K/uL    RBC 3.57 (L) 3.93 - 5.22 M/uL    Hemoglobin 11.8 11.2 - 15.7 g/dL    Hematocrit 31.9 (L) 37.0 - 47.0 %    MCV 89.4 79.4 - 94.8 fL    MCH 33.1 (H) 25.6 - 32.2 pg    MCHC 37.0 (H) 32.2 - 35.5 %    RDW 14.1 11.7 - 14.4 %    Platelets 943 984 - 987 K/uL    Neutrophils % 71.3 (H) 34.0 - 71.1 %    Immature Granulocytes % 0.2 %    Lymphocytes % 16.6 %    Monocytes % 10.1 4.7 - 12.5 %    Eosinophils % 1.4 0.7 - 5.8 %    Basophils % 0.4 0.1 - 1.2 %    Neutrophils Absolute 3.7 1.6 - 6.1 K/uL    Immature Granulocytes # 0.0 K/uL    Lymphocytes Absolute 0.9 (L) 1.2 - 3.7 K/uL    Monocytes Absolute 0.5 0.2 - 0.9 K/uL    Eosinophils Absolute 0.1 0.0 - 0.4 K/uL    Basophils Absolute 0.0 0.0 - 0.1 K/uL   Comprehensive Metabolic Panel   Result Value Ref Range Sodium 142 135 - 144 mEq/L    Potassium 3.5 3.4 - 4.9 mEq/L    Chloride 107 95 - 107 mEq/L    CO2 26 20 - 31 mEq/L    Anion Gap 9 9 - 15 mEq/L    Glucose 127 (H) 70 - 99 mg/dL    BUN 8 8 - 23 mg/dL    CREATININE 0.79 0.50 - 0.90 mg/dL    GFR Non-African American >60.0 >60    GFR  >60.0 >60    Calcium 7.9 (L) 8.5 - 9.9 mg/dL    Total Protein 5.9 (L) 6.3 - 8.0 g/dL    Albumin 3.1 (L) 3.5 - 4.6 g/dL    Total Bilirubin 0.3 0.2 - 0.7 mg/dL    Alkaline Phosphatase 48 40 - 130 U/L    ALT 10 0 - 33 U/L    AST 27 0 - 35 U/L    Globulin 2.8 2.3 - 3.5 g/dL   Magnesium   Result Value Ref Range    Magnesium 1.5 (L) 1.7 - 2.4 mg/dL   CBC Auto Differential   Result Value Ref Range    WBC 6.5 4.0 - 10.0 K/uL    RBC 3.33 (L) 3.93 - 5.22 M/uL    Hemoglobin 11.1 (L) 11.2 - 15.7 g/dL    Hematocrit 29.6 (L) 37.0 - 47.0 %    MCV 88.9 79.4 - 94.8 fL    MCH 33.3 (H) 25.6 - 32.2 pg    MCHC 37.5 (H) 32.2 - 35.5 %    RDW 14.3 11.7 - 14.4 %    Platelets 990 907 - 933 K/uL    Neutrophils % 72.6 (H) 34.0 - 71.1 %    Immature Granulocytes % 0.2 %    Lymphocytes % 14.8 %    Monocytes % 10.2 4.7 - 12.5 %    Eosinophils % 1.9 0.7 - 5.8 %    Basophils % 0.3 0.1 - 1.2 %    Neutrophils Absolute 4.7 1.6 - 6.1 K/uL    Immature Granulocytes # 0.0 K/uL    Lymphocytes Absolute 1.0 (L) 1.2 - 3.7 K/uL    Monocytes Absolute 0.7 0.2 - 0.9 K/uL    Eosinophils Absolute 0.1 0.0 - 0.4 K/uL    Basophils Absolute 0.0 0.0 - 0.1 K/uL   Comprehensive Metabolic Panel   Result Value Ref Range    Sodium 140 135 - 144 mEq/L    Potassium 4.3 3.4 - 4.9 mEq/L    Chloride 108 (H) 95 - 107 mEq/L    CO2 25 20 - 31 mEq/L    Anion Gap 7 (L) 9 - 15 mEq/L    Glucose 112 (H) 70 - 99 mg/dL    BUN 8 8 - 23 mg/dL    CREATININE 0.86 0.50 - 0.90 mg/dL    GFR Non-African American >60.0 >60    GFR  >60.0 >60    Calcium 8.0 (L) 8.5 - 9.9 mg/dL    Total Protein 5.5 (L) 6.3 - 8.0 g/dL    Albumin 2.8 (L) 3.5 - 4.6 g/dL    Total Bilirubin <0.2 0.2 - 0.7 mg/dL Alkaline Phosphatase 45 40 - 130 U/L    ALT 13 0 - 33 U/L    AST 29 0 - 35 U/L    Globulin 2.7 2.3 - 3.5 g/dL   Magnesium   Result Value Ref Range    Magnesium 1.8 1.7 - 2.4 mg/dL   Lipid Panel   Result Value Ref Range    Cholesterol, Total 110 0 - 199 mg/dL    Triglycerides 95 0 - 150 mg/dL    HDL 37 (L) 40 - 59 mg/dL    LDL Calculated 54 0 - 129 mg/dL   CBC Auto Differential   Result Value Ref Range    WBC 4.5 4.0 - 10.0 K/uL    RBC 3.31 (L) 3.93 - 5.22 M/uL    Hemoglobin 10.9 (L) 11.2 - 15.7 g/dL    Hematocrit 29.6 (L) 37.0 - 47.0 %    MCV 89.4 79.4 - 94.8 fL    MCH 32.9 (H) 25.6 - 32.2 pg    MCHC 36.8 (H) 32.2 - 35.5 %    RDW 14.0 11.7 - 14.4 %    Platelets 76 (L) 813 - 369 K/uL    PLATELET SLIDE REVIEW Clumped     Neutrophils % 71.5 (H) 34.0 - 71.1 %    Immature Granulocytes % 0.2 %    Lymphocytes % 17.4 %    Monocytes % 8.7 4.7 - 12.5 %    Eosinophils % 2.0 0.7 - 5.8 %    Basophils % 0.2 0.1 - 1.2 %    Neutrophils Absolute 3.2 1.6 - 6.1 K/uL    Immature Granulocytes # 0.0 K/uL    Lymphocytes Absolute 0.8 (L) 1.2 - 3.7 K/uL    Monocytes Absolute 0.4 0.2 - 0.9 K/uL    Eosinophils Absolute 0.1 0.0 - 0.4 K/uL    Basophils Absolute 0.0 0.0 - 0.1 K/uL   Comprehensive Metabolic Panel   Result Value Ref Range    Sodium 141 135 - 144 mEq/L    Potassium 4.4 3.4 - 4.9 mEq/L    Chloride 105 95 - 107 mEq/L    CO2 27 20 - 31 mEq/L    Anion Gap 9 9 - 15 mEq/L    Glucose 106 (H) 70 - 99 mg/dL    BUN 6 (L) 8 - 23 mg/dL    CREATININE 0.86 0.50 - 0.90 mg/dL    GFR Non-African American >60.0 >60    GFR  >60.0 >60    Calcium 8.7 8.5 - 9.9 mg/dL    Total Protein 5.9 (L) 6.3 - 8.0 g/dL    Albumin 3.0 (L) 3.5 - 4.6 g/dL    Total Bilirubin 0.3 0.2 - 0.7 mg/dL    Alkaline Phosphatase 52 40 - 130 U/L    ALT 15 0 - 33 U/L    AST 29 0 - 35 U/L    Globulin 2.9 2.3 - 3.5 g/dL   Magnesium   Result Value Ref Range    Magnesium 1.8 1.7 - 2.4 mg/dL       Diet:  ADULT DIET;  Regular    Activity:  Activity as tolerated (Patient may move about with assist as indicated or with supervision.)    Follow-up:  in 1 weeks with Deep Vaca MD,      Disposition: home    Condition: Stable    Time Spent: 50 minutes    Electronically signed by Kenneth Ag MD on 11/25/2021 at 9:22 AM    Discharging Hospitalist

## 2021-11-25 NOTE — PROGRESS NOTES
Freedom of choice for  HHC offered to the patient. Patient reported that she is not sure if she would like [facility] to come to her house. Patient said that she will let me know if she change her mind. Continue to fallow.

## 2021-12-08 NOTE — PROGRESS NOTES
Physician Progress Note      PATIENT:               Flores Parker  CSN #:                  [id number]  :                       1940  ADMIT DATE:       2021 8:29 PM  [address] [place] DATE:        2021 1:10 PM  RESPONDING  PROVIDER #:        Alejandra Ontiveros MD          QUERY TEXT:    Pt admitted with AMS, difficulty speaking and UTI and questionable stroke   noted in H&P. Noted documentation of ischemia on brain MRI 2021. If If   possible, please document in progress notes and discharge summary:    The medical record reflects the following:  Risk Factors: hypertension, hyperlipidemia, hearing loss  Clinical Indicators: per H&P \"Blood pressure on presentation was   uncontrolled-high as 200/72 in the ER\", MRI results \"There are 2 foci of   subacute ischemia and different vascular territories. The largest ischemic   area is centered in the left frontal lobe deep white matter and extends to the   cortex. There is a 3 mm area of ischemia in the subcortical white matter of the right   occipital lobe. \"  Treatment: MRI brain, CT    Alicia Reeves RN, BSN, CCDS, CDI  [email or web address]  Options provided:  -- Stroke confirmed present on admission  -- Stroke ruled out  -- Other - I will add my own diagnosis  -- Disagree - Not applicable / Not valid  -- Disagree - Clinically unable to determine / Unknown  -- Refer to Clinical Documentation Reviewer    PROVIDER RESPONSE TEXT:    The patient has a stroke confirmed present on admission.     Query created by: Gema Del Angel on 2021 9:10 AM      Electronically signed by:  Alejandra Ontiveros MD 2021 7:50 PM

## 2022-02-14 ENCOUNTER — APPOINTMENT (OUTPATIENT)
Dept: CT IMAGING | Age: 82
End: 2022-02-14
Payer: MEDICARE

## 2022-02-14 ENCOUNTER — OFFICE VISIT (OUTPATIENT)
Dept: FAMILY MEDICINE CLINIC | Age: 82
End: 2022-02-14
Payer: MEDICARE

## 2022-02-14 ENCOUNTER — HOSPITAL ENCOUNTER (OUTPATIENT)
Age: 82
Setting detail: OBSERVATION
Discharge: HOME | End: 2022-02-15
Attending: EMERGENCY MEDICINE | Admitting: INTERNAL MEDICINE
Payer: MEDICARE

## 2022-02-14 ENCOUNTER — APPOINTMENT (OUTPATIENT)
Dept: GENERAL RADIOLOGY | Age: 82
End: 2022-02-14
Payer: MEDICARE

## 2022-02-14 VITALS
SYSTOLIC BLOOD PRESSURE: 240 MMHG | WEIGHT: 111.4 LBS | HEART RATE: 65 BPM | OXYGEN SATURATION: 98 % | TEMPERATURE: 97.6 F | DIASTOLIC BLOOD PRESSURE: 100 MMHG | BODY MASS INDEX: 19.12 KG/M2

## 2022-02-14 DIAGNOSIS — I63.89 CEREBROVASCULAR ACCIDENT (CVA) DUE TO OTHER MECHANISM (HCC): ICD-10-CM

## 2022-02-14 DIAGNOSIS — H91.93 BILATERAL HEARING LOSS, UNSPECIFIED HEARING LOSS TYPE: ICD-10-CM

## 2022-02-14 DIAGNOSIS — I16.0 HYPERTENSIVE URGENCY: Primary | ICD-10-CM

## 2022-02-14 DIAGNOSIS — I10 PRIMARY HYPERTENSION: ICD-10-CM

## 2022-02-14 DIAGNOSIS — I10 HTN (HYPERTENSION), MALIGNANT: Primary | ICD-10-CM

## 2022-02-14 DIAGNOSIS — E03.9 HYPOTHYROIDISM, UNSPECIFIED TYPE: ICD-10-CM

## 2022-02-14 DIAGNOSIS — F41.1 ANXIETY STATE: ICD-10-CM

## 2022-02-14 LAB
ALBUMIN SERPL-MCNC: 4.4 G/DL (ref 3.5–4.6)
ALP BLD-CCNC: 77 U/L (ref 40–130)
ALT SERPL-CCNC: 8 U/L (ref 0–33)
ANION GAP SERPL CALCULATED.3IONS-SCNC: 14 MEQ/L (ref 9–15)
AST SERPL-CCNC: 19 U/L (ref 0–35)
BACTERIA: ABNORMAL /HPF
BASOPHILS ABSOLUTE: 0 K/UL (ref 0–0.1)
BASOPHILS RELATIVE PERCENT: 0.4 % (ref 0.1–1.2)
BILIRUB SERPL-MCNC: 0.7 MG/DL (ref 0.2–0.7)
BILIRUBIN URINE: NEGATIVE
BLOOD, URINE: NEGATIVE
BUN BLDV-MCNC: 14 MG/DL (ref 8–23)
CALCIUM SERPL-MCNC: 9.9 MG/DL (ref 8.5–9.9)
CHLORIDE BLD-SCNC: 102 MEQ/L (ref 95–107)
CLARITY: CLEAR
CO2: 24 MEQ/L (ref 20–31)
COLOR: YELLOW
CREAT SERPL-MCNC: 0.84 MG/DL (ref 0.5–0.9)
EKG ATRIAL RATE: 60 BPM
EKG P AXIS: 64 DEGREES
EKG P-R INTERVAL: 166 MS
EKG Q-T INTERVAL: 420 MS
EKG QRS DURATION: 74 MS
EKG QTC CALCULATION (BAZETT): 420 MS
EKG R AXIS: -41 DEGREES
EKG T AXIS: 27 DEGREES
EKG VENTRICULAR RATE: 60 BPM
EOSINOPHILS ABSOLUTE: 0.1 K/UL (ref 0–0.4)
EOSINOPHILS RELATIVE PERCENT: 0.9 % (ref 0.7–5.8)
EPITHELIAL CELLS, UA: ABNORMAL /HPF
GFR AFRICAN AMERICAN: >60
GFR NON-AFRICAN AMERICAN: >60
GLOBULIN: 4.4 G/DL (ref 2.3–3.5)
GLUCOSE BLD-MCNC: 112 MG/DL (ref 70–99)
GLUCOSE URINE: NEGATIVE MG/DL
HCT VFR BLD CALC: 36.4 % (ref 37–47)
HEMOGLOBIN: 12.9 G/DL (ref 11.2–15.7)
IMMATURE GRANULOCYTES #: 0 K/UL
IMMATURE GRANULOCYTES %: 0.2 %
INR BLD: 0.9
KETONES, URINE: ABNORMAL MG/DL
LEUKOCYTE ESTERASE, URINE: ABNORMAL
LYMPHOCYTES ABSOLUTE: 0.8 K/UL (ref 1.2–3.7)
LYMPHOCYTES RELATIVE PERCENT: 14.5 %
MAGNESIUM: 1.6 MG/DL (ref 1.7–2.4)
MCH RBC QN AUTO: 32.5 PG (ref 25.6–32.2)
MCHC RBC AUTO-ENTMCNC: 35.4 % (ref 32.2–35.5)
MCV RBC AUTO: 91.7 FL (ref 79.4–94.8)
MONOCYTES ABSOLUTE: 0.4 K/UL (ref 0.2–0.9)
MONOCYTES RELATIVE PERCENT: 6.6 % (ref 4.7–12.5)
NEUTROPHILS ABSOLUTE: 4.4 K/UL (ref 1.6–6.1)
NEUTROPHILS RELATIVE PERCENT: 77.4 % (ref 34–71.1)
NITRITE, URINE: NEGATIVE
PDW BLD-RTO: 12.6 % (ref 11.7–14.4)
PH UA: 5.5 (ref 5–9)
PHOSPHORUS: 3.4 MG/DL (ref 2.3–4.8)
PLATELET # BLD: 197 K/UL (ref 182–369)
POTASSIUM SERPL-SCNC: 3.9 MEQ/L (ref 3.4–4.9)
PROTEIN UA: 100 MG/DL
PROTHROMBIN TIME: 12.4 SEC (ref 12.3–14.9)
RBC # BLD: 3.97 M/UL (ref 3.93–5.22)
RBC UA: ABNORMAL /HPF (ref 0–2)
SARS-COV-2, NAAT: NOT DETECTED
SODIUM BLD-SCNC: 140 MEQ/L (ref 135–144)
SPECIFIC GRAVITY UA: 1.02 (ref 1–1.03)
TOTAL PROTEIN: 8.8 G/DL (ref 6.3–8)
TROPONIN: <0.01 NG/ML (ref 0–0.01)
URINE REFLEX TO CULTURE: ABNORMAL
UROBILINOGEN, URINE: 0.2 E.U./DL
WBC # BLD: 5.6 K/UL (ref 4–10)
WBC UA: ABNORMAL /HPF (ref 0–5)

## 2022-02-14 PROCEDURE — 99204 OFFICE O/P NEW MOD 45 MIN: CPT | Performed by: FAMILY MEDICINE

## 2022-02-14 PROCEDURE — 36415 COLL VENOUS BLD VENIPUNCTURE: CPT

## 2022-02-14 PROCEDURE — 87635 SARS-COV-2 COVID-19 AMP PRB: CPT

## 2022-02-14 PROCEDURE — 85025 COMPLETE CBC W/AUTO DIFF WBC: CPT

## 2022-02-14 PROCEDURE — 80053 COMPREHEN METABOLIC PANEL: CPT

## 2022-02-14 PROCEDURE — 85610 PROTHROMBIN TIME: CPT

## 2022-02-14 PROCEDURE — 99282 EMERGENCY DEPT VISIT SF MDM: CPT

## 2022-02-14 PROCEDURE — 93000 ELECTROCARDIOGRAM COMPLETE: CPT | Performed by: FAMILY MEDICINE

## 2022-02-14 PROCEDURE — G0378 HOSPITAL OBSERVATION PER HR: HCPCS

## 2022-02-14 PROCEDURE — 82088 ASSAY OF ALDOSTERONE: CPT

## 2022-02-14 PROCEDURE — 84244 ASSAY OF RENIN: CPT

## 2022-02-14 PROCEDURE — 93005 ELECTROCARDIOGRAM TRACING: CPT

## 2022-02-14 PROCEDURE — 2580000003 HC RX 258: Performed by: EMERGENCY MEDICINE

## 2022-02-14 PROCEDURE — 96375 TX/PRO/DX INJ NEW DRUG ADDON: CPT

## 2022-02-14 PROCEDURE — 83735 ASSAY OF MAGNESIUM: CPT

## 2022-02-14 PROCEDURE — 84100 ASSAY OF PHOSPHORUS: CPT

## 2022-02-14 PROCEDURE — 6360000002 HC RX W HCPCS: Performed by: EMERGENCY MEDICINE

## 2022-02-14 PROCEDURE — 84484 ASSAY OF TROPONIN QUANT: CPT

## 2022-02-14 PROCEDURE — 96365 THER/PROPH/DIAG IV INF INIT: CPT

## 2022-02-14 PROCEDURE — 70450 CT HEAD/BRAIN W/O DYE: CPT

## 2022-02-14 PROCEDURE — 6370000000 HC RX 637 (ALT 250 FOR IP): Performed by: INTERNAL MEDICINE

## 2022-02-14 PROCEDURE — 81001 URINALYSIS AUTO W/SCOPE: CPT

## 2022-02-14 PROCEDURE — 71045 X-RAY EXAM CHEST 1 VIEW: CPT

## 2022-02-14 PROCEDURE — 2500000003 HC RX 250 WO HCPCS: Performed by: EMERGENCY MEDICINE

## 2022-02-14 RX ORDER — ASPIRIN 81 MG/1
81 TABLET, CHEWABLE ORAL DAILY
Status: DISCONTINUED | OUTPATIENT
Start: 2022-02-14 | End: 2022-02-15 | Stop reason: HOSPADM

## 2022-02-14 RX ORDER — LEVOTHYROXINE SODIUM 0.07 MG/1
75 TABLET ORAL DAILY
Status: DISCONTINUED | OUTPATIENT
Start: 2022-02-14 | End: 2022-02-15 | Stop reason: HOSPADM

## 2022-02-14 RX ORDER — SPIRONOLACTONE 25 MG/1
25 TABLET ORAL DAILY
Status: DISCONTINUED | OUTPATIENT
Start: 2022-02-14 | End: 2022-02-15 | Stop reason: HOSPADM

## 2022-02-14 RX ORDER — METOPROLOL SUCCINATE 50 MG/1
100 TABLET, EXTENDED RELEASE ORAL DAILY
Status: DISCONTINUED | OUTPATIENT
Start: 2022-02-14 | End: 2022-02-15 | Stop reason: HOSPADM

## 2022-02-14 RX ORDER — FLUTICASONE PROPIONATE 44 UG/1
1 AEROSOL, METERED RESPIRATORY (INHALATION) 2 TIMES DAILY
Status: DISCONTINUED | OUTPATIENT
Start: 2022-02-14 | End: 2022-02-15 | Stop reason: HOSPADM

## 2022-02-14 RX ORDER — SODIUM CHLORIDE 9 MG/ML
25 INJECTION, SOLUTION INTRAVENOUS PRN
Status: DISCONTINUED | OUTPATIENT
Start: 2022-02-14 | End: 2022-02-15 | Stop reason: HOSPADM

## 2022-02-14 RX ORDER — ACETAMINOPHEN 650 MG/1
650 SUPPOSITORY RECTAL EVERY 6 HOURS PRN
Status: DISCONTINUED | OUTPATIENT
Start: 2022-02-14 | End: 2022-02-15 | Stop reason: HOSPADM

## 2022-02-14 RX ORDER — LABETALOL HYDROCHLORIDE 5 MG/ML
20 INJECTION, SOLUTION INTRAVENOUS ONCE
Status: COMPLETED | OUTPATIENT
Start: 2022-02-14 | End: 2022-02-14

## 2022-02-14 RX ORDER — ONDANSETRON 2 MG/ML
4 INJECTION INTRAMUSCULAR; INTRAVENOUS EVERY 6 HOURS PRN
Status: DISCONTINUED | OUTPATIENT
Start: 2022-02-14 | End: 2022-02-15 | Stop reason: HOSPADM

## 2022-02-14 RX ORDER — PROMETHAZINE HYDROCHLORIDE 12.5 MG/1
12.5 TABLET ORAL EVERY 6 HOURS PRN
Status: DISCONTINUED | OUTPATIENT
Start: 2022-02-14 | End: 2022-02-15 | Stop reason: HOSPADM

## 2022-02-14 RX ORDER — MAGNESIUM SULFATE 1 G/100ML
1000 INJECTION INTRAVENOUS ONCE
Status: COMPLETED | OUTPATIENT
Start: 2022-02-14 | End: 2022-02-14

## 2022-02-14 RX ORDER — LORAZEPAM 2 MG/ML
0.5 INJECTION INTRAMUSCULAR ONCE
Status: COMPLETED | OUTPATIENT
Start: 2022-02-14 | End: 2022-02-14

## 2022-02-14 RX ORDER — ATORVASTATIN CALCIUM 10 MG/1
20 TABLET, FILM COATED ORAL NIGHTLY
Status: DISCONTINUED | OUTPATIENT
Start: 2022-02-14 | End: 2022-02-15 | Stop reason: HOSPADM

## 2022-02-14 RX ORDER — LISINOPRIL 20 MG/1
40 TABLET ORAL DAILY
Status: DISCONTINUED | OUTPATIENT
Start: 2022-02-14 | End: 2022-02-15 | Stop reason: HOSPADM

## 2022-02-14 RX ORDER — SODIUM CHLORIDE 0.9 % (FLUSH) 0.9 %
10 SYRINGE (ML) INJECTION EVERY 12 HOURS SCHEDULED
Status: DISCONTINUED | OUTPATIENT
Start: 2022-02-14 | End: 2022-02-15 | Stop reason: HOSPADM

## 2022-02-14 RX ORDER — SODIUM CHLORIDE 0.9 % (FLUSH) 0.9 %
10 SYRINGE (ML) INJECTION PRN
Status: DISCONTINUED | OUTPATIENT
Start: 2022-02-14 | End: 2022-02-15 | Stop reason: HOSPADM

## 2022-02-14 RX ORDER — ACETAMINOPHEN 325 MG/1
650 TABLET ORAL EVERY 6 HOURS PRN
Status: DISCONTINUED | OUTPATIENT
Start: 2022-02-14 | End: 2022-02-15 | Stop reason: HOSPADM

## 2022-02-14 RX ORDER — POLYETHYLENE GLYCOL 3350 17 G/17G
17 POWDER, FOR SOLUTION ORAL DAILY PRN
Status: DISCONTINUED | OUTPATIENT
Start: 2022-02-14 | End: 2022-02-15 | Stop reason: HOSPADM

## 2022-02-14 RX ORDER — SODIUM CHLORIDE 0.9 % (FLUSH) 0.9 %
3 SYRINGE (ML) INJECTION EVERY 8 HOURS
Status: DISCONTINUED | OUTPATIENT
Start: 2022-02-14 | End: 2022-02-15 | Stop reason: HOSPADM

## 2022-02-14 RX ADMIN — ATORVASTATIN CALCIUM 20 MG: 10 TABLET, FILM COATED ORAL at 21:35

## 2022-02-14 RX ADMIN — LISINOPRIL 40 MG: 20 TABLET ORAL at 16:50

## 2022-02-14 RX ADMIN — Medication 3 ML: at 13:41

## 2022-02-14 RX ADMIN — METOPROLOL SUCCINATE 100 MG: 50 TABLET, EXTENDED RELEASE ORAL at 16:50

## 2022-02-14 RX ADMIN — SPIRONOLACTONE 25 MG: 25 TABLET ORAL at 21:35

## 2022-02-14 RX ADMIN — LORAZEPAM 0.5 MG: 2 INJECTION INTRAMUSCULAR; INTRAVENOUS at 13:41

## 2022-02-14 RX ADMIN — Medication 3 ML: at 21:36

## 2022-02-14 RX ADMIN — LABETALOL HYDROCHLORIDE 20 MG: 5 INJECTION, SOLUTION INTRAVENOUS at 13:41

## 2022-02-14 RX ADMIN — MAGNESIUM SULFATE HEPTAHYDRATE 1000 MG: 1 INJECTION, SOLUTION INTRAVENOUS at 14:28

## 2022-02-14 SDOH — ECONOMIC STABILITY: FOOD INSECURITY: WITHIN THE PAST 12 MONTHS, YOU WORRIED THAT YOUR FOOD WOULD RUN OUT BEFORE YOU GOT MONEY TO BUY MORE.: NEVER TRUE

## 2022-02-14 SDOH — ECONOMIC STABILITY: FOOD INSECURITY: WITHIN THE PAST 12 MONTHS, THE FOOD YOU BOUGHT JUST DIDN'T LAST AND YOU DIDN'T HAVE MONEY TO GET MORE.: NEVER TRUE

## 2022-02-14 ASSESSMENT — ENCOUNTER SYMPTOMS
CHEST TIGHTNESS: 0
CONSTIPATION: 0
EYE ITCHING: 0
SHORTNESS OF BREATH: 0
EYE PAIN: 0
EYE DISCHARGE: 0
BLOOD IN STOOL: 0
CHOKING: 0
COUGH: 0
DIARRHEA: 0
STRIDOR: 0
SORE THROAT: 0
DIARRHEA: 0
ABDOMINAL PAIN: 0
COUGH: 0
BACK PAIN: 0
APNEA: 0
VOICE CHANGE: 0
CONSTIPATION: 0
NAUSEA: 0
FACIAL SWELLING: 0
WHEEZING: 0
EYE PAIN: 0
TROUBLE SWALLOWING: 0
EYE DISCHARGE: 0
VOMITING: 0
ABDOMINAL PAIN: 0
SHORTNESS OF BREATH: 0
SINUS PRESSURE: 0
VOMITING: 0
EYE REDNESS: 0
CHEST TIGHTNESS: 0
BLOOD IN STOOL: 0

## 2022-02-14 ASSESSMENT — PATIENT HEALTH QUESTIONNAIRE - PHQ9
SUM OF ALL RESPONSES TO PHQ QUESTIONS 1-9: 0
SUM OF ALL RESPONSES TO PHQ9 QUESTIONS 1 & 2: 0
1. LITTLE INTEREST OR PLEASURE IN DOING THINGS: 0
2. FEELING DOWN, DEPRESSED OR HOPELESS: 0
SUM OF ALL RESPONSES TO PHQ QUESTIONS 1-9: 0

## 2022-02-14 ASSESSMENT — PAIN SCALES - GENERAL: PAINLEVEL_OUTOF10: 0

## 2022-02-14 ASSESSMENT — SOCIAL DETERMINANTS OF HEALTH (SDOH): HOW HARD IS IT FOR YOU TO PAY FOR THE VERY BASICS LIKE FOOD, HOUSING, MEDICAL CARE, AND HEATING?: NOT HARD AT ALL

## 2022-02-14 NOTE — ED NOTES
Dr. Kristofer Vivas called back to Dr. Pedro Way.   Dreek Wagner [facility] Farshad  02/14/22 3688

## 2022-02-14 NOTE — ED TRIAGE NOTES
Pt presents to ED with HTN; pt was at Dr. Zackary Singletary' office and ambulance was called for elevated pressure. Pt states she has been taking medications as ordered.

## 2022-02-14 NOTE — PROGRESS NOTES
Subjective:      Patient ID: Papo Crowe is a [de-identified] y.o. female who presents today for:     Chief Complaint   Patient presents with   [facility].     would like ENT referral        HPI  Patient is a very pleasant 72-year-old female presents today with her son to establish care. She has a history of hypertension which has been uncontrolled and a recent admission in November 2021 for altered mental status and slurred speech. Was found to have a subacute CVA and neurology was consulted. Cardiology also performed a MAIK which showed normal ejection fraction. Blood pressure has been historically uncontrolled and patient states that she is unable to tolerate hydralazine and lisinopril was increased from 20 mg to 40 mg during her medicine. She previously has been seen by [facility] primary care and cardiology. She is supposed to follow-up with neurology post discharge but has not done so. She does not check her blood pressure and she is scared of the readings. She endorses a mild headache and eye watering. Patient also has a history of bilateral hearing loss and cochlear implants have been recommended.   Due to insurance restrictions she is not able to follow-up with [facility] ENT and therefore is requesting a new referral    Past Medical History:   Diagnosis Date    Allergic rhinitis     Asthma     uses flovent    Hearing loss     Hyperlipidemia     Hypertension     Hypothyroidism     Lichen planus     Dr. Ted Smith     Past Surgical History:   Procedure Laterality Date    HYSTERECTOMY, TOTAL ABDOMINAL      TONSILLECTOMY       Family History   Problem Relation Age of Onset    High Blood Pressure Mother      Social History     Socioeconomic History    Marital status: Single     Spouse name: Not on file    Number of children: 2    Years of education: Not on file    Highest education level: Not on file   Occupational History    Not on file   Tobacco Use    Smoking status: Never Smoker  Smokeless tobacco: Never Used   Substance and Sexual Activity    Alcohol use: No    Drug use: No    Sexual activity: Not Currently   Other Topics Concern    Not on file   Social History Narrative    Has 2 sons. One of her sons lives with her in a mobile home. Social Determinants of Health     Financial Resource Strain: Low Risk     Difficulty of Paying Living Expenses: Not hard at all   Food Insecurity: No Food Insecurity    Worried About Running Out of Food in the Last Year: Never true    Genevieve of Food in the Last Year: Never true   Transportation Needs:     Lack of Transportation (Medical): Not on file    Lack of Transportation (Non-Medical):  Not on file   Physical Activity:     Days of Exercise per Week: Not on file    Minutes of Exercise per Session: Not on file   Stress:     Feeling of Stress : Not on file   Social Connections:     Frequency of Communication with Friends and Family: Not on file    Frequency of Social Gatherings with Friends and Family: Not on file    Attends Quaker Services: Not on file    Active Member of [address] [place] [address] or Organizations: Not on file    Attends Club or Organization Meetings: Not on file    Marital Status: Not on file   Intimate Partner Violence:     Fear of Current or Ex-Partner: Not on file    Emotionally Abused: Not on file    Physically Abused: Not on file    Sexually Abused: Not on file   Housing Stability:     Unable to Pay for Housing in the Last Year: Not on file    Number of [place] in the Last Year: Not on file    Unstable Housing in the Last Year: Not on file     Current Outpatient Medications on File Prior to Visit   Medication Sig Dispense Refill    lisinopril (PRINIVIL;ZESTRIL) 40 MG tablet Take 1 tablet by mouth daily 30 tablet 3    aspirin 81 MG chewable tablet Take 1 tablet by mouth daily 30 tablet 3    hydrALAZINE (APRESOLINE) 100 MG tablet Take 100 mg by mouth 2 times daily Pt thinks she may have a possible allergy to this medication. Itching      Artificial Tear Solution (TEARS AGAIN ADVANCED EYELID) SOLN Apply to eye      FLOVENT HFA 44 MCG/ACT inhaler INHALE 1 PUFF BY MOUTH TWICE DAILY FOR 90 DAYS.  metoprolol succinate (TOPROL XL) 100 MG extended release tablet Take 100 mg by mouth daily      levothyroxine (SYNTHROID) 75 MCG tablet Take 75 mcg by mouth Daily      atorvastatin (LIPITOR) 20 MG tablet Take 20 mg by mouth daily       No current facility-administered medications on file prior to visit. Allergies:  Albuterol, Amlodipine, Hydralazine, Metformin and related, and Zoster vac recomb adjuvanted    Review of Systems   Constitutional: Negative for activity change, appetite change and fatigue. HENT: Positive for hearing loss. Eyes: Negative for pain, discharge and itching. Respiratory: Negative for apnea, cough, chest tightness and shortness of breath. Cardiovascular: Negative for chest pain, palpitations and leg swelling. Gastrointestinal: Negative for abdominal pain, blood in stool, constipation, diarrhea, nausea and vomiting. Musculoskeletal: Negative for arthralgias. Neurological: Positive for headaches. Negative for seizures and light-headedness. Psychiatric/Behavioral: Negative for hallucinations and suicidal ideas. Objective:   BP (!) 240/100   Pulse 65   Temp 97.6 °F (36.4 °C) (Infrared)   Wt 111 lb 6.4 oz (50.5 kg)   SpO2 98%   BMI 19.12 kg/m²     Physical Exam  Vitals and nursing note reviewed. Constitutional:       General: She is not in acute distress. Appearance: Normal appearance. She is well-developed. She is not diaphoretic. HENT:      Head: Normocephalic and atraumatic. Nose: Nose normal.      Mouth/Throat:      Mouth: Mucous membranes are moist.      Pharynx: Oropharynx is clear. Eyes:      Conjunctiva/sclera: Conjunctivae normal.      Pupils: Pupils are equal, round, and reactive to light.    Cardiovascular:      Rate and Rhythm: Normal rate and regular rhythm. Heart sounds: Normal heart sounds. No murmur heard. No friction rub. No gallop. Pulmonary:      Effort: Pulmonary effort is normal. No respiratory distress. Breath sounds: Normal breath sounds. No wheezing or rales. Chest:      Chest wall: No tenderness. Abdominal:      General: Abdomen is flat. Bowel sounds are normal.      Palpations: Abdomen is soft. Tenderness: There is no abdominal tenderness. Musculoskeletal:      Cervical back: Normal range of motion. Skin:     General: Skin is warm and dry. Neurological:      Mental Status: She is alert and oriented to person, place, and time. Psychiatric:         Behavior: Behavior normal.         Thought Content: Thought content normal.         Judgment: Judgment normal.         Assessment & Plan:     1. HTN (hypertension), malignant  Uncontrolled: Blood pressure checked 3 times. Given how high blood pressure is along with history of past CVA the patient should be evaluated in the emergency room for further treatment in order to ensure there is no end-organ damage and for blood pressure to be lowered slowly and monitored  Patient unable to tolerate hydralazine. Patient may respond to a diuretic, or potentially an addition of a calcium channel blocker    - EKG 12 Lead  - Metanephrines Plasma Free; Future    2. Cerebrovascular accident (CVA) due to other mechanism [facility])  Given history, patient should establish with neurology    - Amb External Referral To Neurology    3. Bilateral hearing loss, unspecified hearing loss type  Follow-up with ENT  - Liliane Garrison MD, Otolaryngology, [facility]    4. Hypothyroidism, unspecified type  We will check current thyroid function and titrate medication accordingly  - TSH without Reflex; Future  - T4, Free; Future      Return in about 1 month (around 3/14/2022), or if symptoms worsen or fail to improve, for F/u HTN, AWV.     El Jiang MD

## 2022-02-14 NOTE — ED NOTES
Called to let Supervisor know patient is ready to be transported to the floor.   Mary Ann  02/14/22 0838

## 2022-02-14 NOTE — ED PROVIDER NOTES
[facility] ED  eMERGENCY dEPARTMENT eNCOUnter      Pt Name: Jude Israel  MRN: [id number]  [place] 1940  Date of evaluation: 2/14/2022  Provider: Arie Spears MD    [address] [place] [address]       Chief Complaint   Patient presents with    Hypertension     2  HISTORY OF PRESENT ILLNESS   (Location/Symptom, Timing/Onset,Context/Setting, Quality, Duration, Modifying Factors, Severity)  Note limiting factors. Jude Israel is a [de-identified] y.o. female who presents to the emergency department patient well-known to us from previous encounters emergency for similar situation history of form hypertension taking her medications but states that she she is allergic to one of the medication causing a rash and itching in the left forearm hypothyroidism hypertension hyperlipidemia hard of hearing history of syncope CVA in the past went to see a primary care doctor because blood pressure situation and sent here for further evaluation because uncontrolled blood pressure patient did have slight headache no nausea no vomiting no visual symptoms no double vision denies any chest tightness chest pain no fever no chills, patient is a non-smoker status post remote hysterectomy    HPI    NursingNotes were reviewed. REVIEW OF SYSTEMS    (2-9 systems for level 4, 10 or more for level 5)     Review of Systems   Constitutional: Negative. Negative for activity change and fever. HENT: Negative for congestion, drooling, facial swelling, mouth sores, nosebleeds, sinus pressure, sore throat, trouble swallowing and voice change. Eyes: Negative for pain, discharge, redness and visual disturbance. Respiratory: Negative for cough, choking, chest tightness, shortness of breath, wheezing and stridor. Cardiovascular: Negative for chest pain, palpitations and leg swelling. Gastrointestinal: Negative for abdominal pain, blood in stool, constipation, diarrhea and vomiting.    Endocrine: Negative for cold intolerance, polyphagia and polyuria. Genitourinary: Negative for dysuria, flank pain, frequency, genital sores and urgency. Musculoskeletal: Negative for back pain, joint swelling, neck pain and neck stiffness. Skin: Negative for pallor and rash. Neurological: Positive for headaches. Negative for tremors, seizures, syncope, weakness and numbness. Hematological: Negative for adenopathy. Does not bruise/bleed easily. Psychiatric/Behavioral: Negative for agitation, behavioral problems, hallucinations and sleep disturbance. The patient is nervous/anxious. The patient is not hyperactive. All other systems reviewed and are negative. Except as noted above the remainder of the review of systems was reviewed and negative. PAST MEDICAL HISTORY     Past Medical History:   Diagnosis Date    Allergic rhinitis     Asthma     uses flovent    Hearing loss     Hyperlipidemia     Hypertension     Hypothyroidism     Lichen planus     Dr. Sukhdev Sloan       Past Surgical History:   Procedure Laterality Date    HYSTERECTOMY, TOTAL ABDOMINAL      TONSILLECTOMY           CURRENT MEDICATIONS       Previous Medications    ARTIFICIAL TEAR SOLUTION (TEARS AGAIN ADVANCED EYELID) SOLN    Apply to eye    ASPIRIN 81 MG CHEWABLE TABLET    Take 1 tablet by mouth daily    ATORVASTATIN (LIPITOR) 20 MG TABLET    Take 20 mg by mouth daily    FLOVENT HFA 44 MCG/ACT INHALER    INHALE 1 PUFF BY MOUTH TWICE DAILY FOR 90 DAYS. HYDRALAZINE (APRESOLINE) 100 MG TABLET    Take 100 mg by mouth 2 times daily Pt thinks she may have a possible allergy to this medication.  Itching    LEVOTHYROXINE (SYNTHROID) 75 MCG TABLET    Take 75 mcg by mouth Daily    LISINOPRIL (PRINIVIL;ZESTRIL) 40 MG TABLET    Take 1 tablet by mouth daily    METOPROLOL SUCCINATE (TOPROL XL) 100 MG EXTENDED RELEASE TABLET    Take 100 mg by mouth daily       ALLERGIES     Albuterol, Amlodipine, Hydralazine, Metformin and related, and Zoster vac recomb adjuvanted    FAMILY HISTORY       Family History   Problem Relation Age of Onset    High Blood Pressure Mother           SOCIAL HISTORY       Social History     Socioeconomic History    Marital status: Single     Spouse name: None    Number of children: 2    Years of education: None    Highest education level: None   Occupational History    None   Tobacco Use    Smoking status: Never Smoker    Smokeless tobacco: Never Used   Substance and Sexual Activity    Alcohol use: No    Drug use: No    Sexual activity: Not Currently   Other Topics Concern    None   Social History Narrative    Has 2 sons. One of her sons lives with her in a mobile home. Social Determinants of Health     Financial Resource Strain: Low Risk     Difficulty of Paying Living Expenses: Not hard at all   Food Insecurity: No Food Insecurity    Worried About Running Out of Food in the Last Year: Never true    Genevieve of Food in the Last Year: Never true   Transportation Needs:     Lack of Transportation (Medical): Not on file    Lack of Transportation (Non-Medical):  Not on file   Physical Activity:     Days of Exercise per Week: Not on file    Minutes of Exercise per Session: Not on file   Stress:     Feeling of Stress : Not on file   Social Connections:     Frequency of Communication with Friends and Family: Not on file    Frequency of Social Gatherings with Friends and Family: Not on file    Attends Jewish Services: Not on file    Active Member of Clubs or Organizations: Not on file    Attends Club or Organization Meetings: Not on file    Marital Status: Not on file   Intimate Partner Violence:     Fear of Current or Ex-Partner: Not on file    Emotionally Abused: Not on file    Physically Abused: Not on file    Sexually Abused: Not on file   Housing Stability:     Unable to Pay for Housing in the Last Year: Not on file    Number of [place] in the Last Year: Not on file    Unstable Housing in the Last Year: Not on file       SCREENINGS      @FLOW([id number])@      PHYSICAL EXAM    (up to 7 for level 4, 8 or more for level 5)     ED Triage Vitals [02/14/22 1308]   BP Temp Temp Source Pulse Resp SpO2 Height Weight   (!) 221/107 98.2 °F (36.8 °C) Oral 67 20 98 % 5' 4\" (1.626 m) 112 lb (50.8 kg)       Physical Exam  Vitals and nursing note reviewed. Constitutional:       Appearance: Normal appearance. She is well-developed. She is not ill-appearing, toxic-appearing or diaphoretic. Comments: Alert cooperative patient ambulatory thin built talking full sentences moving all extremities very well   HENT:      Head: Normocephalic and atraumatic. Right Ear: Tympanic membrane, ear canal and external ear normal.      Left Ear: Tympanic membrane, ear canal and external ear normal.      Nose: Congestion present. No rhinorrhea. Mouth/Throat:      Mouth: Mucous membranes are moist.      Pharynx: No oropharyngeal exudate or posterior oropharyngeal erythema. Eyes:      General:         Right eye: No discharge. Left eye: No discharge. Pupils: Pupils are equal, round, and reactive to light. Neck:      Vascular: No carotid bruit. Cardiovascular:      Rate and Rhythm: Normal rate and regular rhythm. Heart sounds: Normal heart sounds. No murmur heard. No gallop. Pulmonary:      Effort: No respiratory distress. Breath sounds: Normal breath sounds. No wheezing or rhonchi. Chest:      Chest wall: No tenderness. Abdominal:      General: Bowel sounds are normal. There is no distension. Palpations: Abdomen is soft. There is no mass. Tenderness: There is no abdominal tenderness. There is no right CVA tenderness, guarding or rebound. Musculoskeletal:         General: No swelling, tenderness or deformity. Normal range of motion. Cervical back: Neck supple. No rigidity or tenderness. Right lower leg: No edema. Lymphadenopathy:      Cervical: No cervical adenopathy.    Skin: General: Skin is warm. Capillary Refill: Capillary refill takes less than 2 seconds. Coloration: Skin is not jaundiced. Findings: No bruising, erythema, lesion or rash. Neurological:      General: No focal deficit present. Mental Status: She is alert and oriented to person, place, and time. Cranial Nerves: No cranial nerve deficit. Sensory: No sensory deficit. Motor: No abnormal muscle tone. Coordination: Coordination normal.      Gait: Gait normal.      Deep Tendon Reflexes: Reflexes normal.   Psychiatric:         Behavior: Behavior normal.         Thought Content: Thought content normal.         DIAGNOSTIC RESULTS     EKG: All EKG's are interpreted by the Emergency Department Physician who either signs or Co-signsthis chart in the absence of a cardiologist.        RADIOLOGY:   [facility] such as CT, Ultrasound and MRI are read by the radiologist. Plain radiographic images are visualized and preliminarily interpreted by the emergency physician with the below findings:        Interpretation per the Radiologist below, if available at the time ofthis note:    XR CHEST PORTABLE   Final Result   1. Cortical atrophy and periventricular microangiopathy. 2. No acute hemorrhage is seen. All CT scans at this facility use dose modulation, iterative reconstruction, and/or weight based dosing when appropriate to reduce radiation dose to as low as reasonably achievable. PORTABLE CHEST      COMPARISON: June 12, 2021      HISTORY: Headache, hypertension      TECHNIQUE: AP view         FINDINGS:   No consolidating pneumonia, pleural effusion or pneumothorax is seen. In the right upper lobe there is scarring. The cardiac silhouette is within normal limits of size. The bony structures are grossly intact. IMPRESSION: No evidence of acute cardiopulmonary process               CT Head WO Contrast   Final Result   1. Cortical atrophy and periventricular microangiopathy. 2. No acute hemorrhage is seen. All CT scans at this facility use dose modulation, iterative reconstruction, and/or weight based dosing when appropriate to reduce radiation dose to as low as reasonably achievable. PORTABLE CHEST      COMPARISON: June 12, 2021      HISTORY: Headache, hypertension      TECHNIQUE: AP view         FINDINGS:   No consolidating pneumonia, pleural effusion or pneumothorax is seen. In the right upper lobe there is scarring. The cardiac silhouette is within normal limits of size. The bony structures are grossly intact. IMPRESSION: No evidence of acute cardiopulmonary process                     ED BEDSIDE ULTRASOUND:   Performed by ED Physician - none    LABS:  Labs Reviewed   COMPREHENSIVE METABOLIC PANEL - Abnormal; Notable for the following components:       Result Value    Glucose 112 (*)     Total Protein 8.8 (*)     Globulin 4.4 (*)     All other components within normal limits   CBC WITH AUTO DIFFERENTIAL - Abnormal; Notable for the following components:    Hematocrit 36.4 (*)     MCH 32.5 (*)     Neutrophils % 77.4 (*)     Lymphocytes Absolute 0.8 (*)     All other components within normal limits   MAGNESIUM - Abnormal; Notable for the following components:    Magnesium 1.6 (*)     All other components within normal limits   COVID-19, RAPID   PROTIME-INR   TROPONIN   URINE RT REFLEX TO CULTURE       All other labs were within normal range or not returned as of this dictation.     EMERGENCY DEPARTMENT COURSE and DIFFERENTIAL DIAGNOSIS/MDM:   Vitals:    Vitals:    02/14/22 1344 02/14/22 1419 02/14/22 1430 02/14/22 1441   BP: (!) 177/104 (!) 147/82 (!) 165/118 (!) 133/57   Pulse:       Resp:       Temp:       TempSrc:       SpO2: 97% 97% 95% 93%   Weight:       Height:               MDM  Number of Diagnoses or Management Options  Diagnosis management comments: Patient history of hypertension remote CVA very hard of hearing seen at the primary care physician's office and sent here for uncontrolled hypertension no slurred speech no extremity weakness no dizziness slight headache EKG performed on arrival normal sinus rhythm left axis deviation rate of 60/min NC interval 166 ms QRS duration 74 ms while QT interval 430 ms no PVCs no ST elevation ischemia on EKG, as per patient she is much relaxed at this time patient was on labetalol along with Ativan which relax her and blood pressure coming down to normal patient had no neuro deficit      CRITICAL CARE TIME   Total Critical Care time was  minutes, excluding separately reportableprocedures. There was a high probability of clinicallysignificant/life threatening deterioration in the patient's condition which required my urgent intervention. NSULTS:  None    PROCEDURES:  Unless otherwise noted below, none     Procedures    FINAL IMPRESSION      1. Hypertensive urgency    2. Primary hypertension    3. Anxiety state          DISPOSITION/PLAN   DISPOSITION        PATIENT REFERRED TO:  No follow-up provider specified.     DISCHARGE MEDICATIONS:  New Prescriptions    No medications on file          (Please note that portions of this note were completed with a voice recognition program.  Efforts were made to edit the dictations but occasionally words are mis-transcribed.)    Nestor Desai MD (electronically signed)  Attending Emergency Physician       Nestor Desai MD  02/14/22 0919

## 2022-02-14 NOTE — ED NOTES
Called Supervisor, bed 204 assigned to this patient. Waiting on admission order.   Lisbeth Linus REED     Soraya Jez  02/14/22 1509

## 2022-02-14 NOTE — PROGRESS NOTES
Patient arrived with son at bedside, no complaint of pain, transferred easily from wheelchair to bed. Has clothes on and her gown. Her son helped with her admission regarding questions due to patients hearing issues. Pt did not take her home meds today. Will medicate and continue to monitor her BP.

## 2022-02-15 VITALS
HEART RATE: 75 BPM | RESPIRATION RATE: 18 BRPM | HEIGHT: 64 IN | WEIGHT: 108.3 LBS | SYSTOLIC BLOOD PRESSURE: 168 MMHG | OXYGEN SATURATION: 96 % | BODY MASS INDEX: 18.49 KG/M2 | TEMPERATURE: 97.7 F | DIASTOLIC BLOOD PRESSURE: 68 MMHG

## 2022-02-15 LAB
ANION GAP SERPL CALCULATED.3IONS-SCNC: 11 MEQ/L (ref 9–15)
BUN BLDV-MCNC: 13 MG/DL (ref 8–23)
CALCIUM SERPL-MCNC: 9.2 MG/DL (ref 8.5–9.9)
CHLORIDE BLD-SCNC: 104 MEQ/L (ref 95–107)
CO2: 26 MEQ/L (ref 20–31)
CREAT SERPL-MCNC: 0.78 MG/DL (ref 0.5–0.9)
GFR AFRICAN AMERICAN: >60
GFR NON-AFRICAN AMERICAN: >60
GLUCOSE BLD-MCNC: 122 MG/DL (ref 70–99)
HCT VFR BLD CALC: 33.1 % (ref 37–47)
HEMOGLOBIN: 11.8 G/DL (ref 11.2–15.7)
MAGNESIUM: 1.8 MG/DL (ref 1.7–2.4)
MCH RBC QN AUTO: 32.4 PG (ref 25.6–32.2)
MCHC RBC AUTO-ENTMCNC: 35.6 % (ref 32.2–35.5)
MCV RBC AUTO: 90.9 FL (ref 79.4–94.8)
PDW BLD-RTO: 12.6 % (ref 11.7–14.4)
PLATELET # BLD: 195 K/UL (ref 182–369)
POTASSIUM REFLEX MAGNESIUM: 4 MEQ/L (ref 3.4–4.9)
RBC # BLD: 3.64 M/UL (ref 3.93–5.22)
SODIUM BLD-SCNC: 141 MEQ/L (ref 135–144)
WBC # BLD: 4.4 K/UL (ref 4–10)

## 2022-02-15 PROCEDURE — 36415 COLL VENOUS BLD VENIPUNCTURE: CPT

## 2022-02-15 PROCEDURE — G0378 HOSPITAL OBSERVATION PER HR: HCPCS

## 2022-02-15 PROCEDURE — 97162 PT EVAL MOD COMPLEX 30 MIN: CPT

## 2022-02-15 PROCEDURE — 83835 ASSAY OF METANEPHRINES: CPT

## 2022-02-15 PROCEDURE — 85027 COMPLETE CBC AUTOMATED: CPT

## 2022-02-15 PROCEDURE — 6370000000 HC RX 637 (ALT 250 FOR IP): Performed by: INTERNAL MEDICINE

## 2022-02-15 PROCEDURE — 96372 THER/PROPH/DIAG INJ SC/IM: CPT

## 2022-02-15 PROCEDURE — 83735 ASSAY OF MAGNESIUM: CPT

## 2022-02-15 PROCEDURE — 2580000003 HC RX 258: Performed by: INTERNAL MEDICINE

## 2022-02-15 PROCEDURE — 80048 BASIC METABOLIC PNL TOTAL CA: CPT

## 2022-02-15 PROCEDURE — 6360000002 HC RX W HCPCS: Performed by: INTERNAL MEDICINE

## 2022-02-15 PROCEDURE — 97530 THERAPEUTIC ACTIVITIES: CPT

## 2022-02-15 RX ORDER — SPIRONOLACTONE 25 MG/1
25 TABLET ORAL DAILY
Qty: 30 TABLET | Refills: 3 | Status: SHIPPED | OUTPATIENT
Start: 2022-02-16 | End: 2022-03-22

## 2022-02-15 RX ORDER — LISINOPRIL AND HYDROCHLOROTHIAZIDE 20; 12.5 MG/1; MG/1
1 TABLET ORAL 2 TIMES DAILY
Qty: 60 TABLET | Refills: 1 | Status: SHIPPED | OUTPATIENT
Start: 2022-02-15 | End: 2022-03-15

## 2022-02-15 RX ADMIN — Medication 10 ML: at 09:16

## 2022-02-15 RX ADMIN — METOPROLOL SUCCINATE 100 MG: 50 TABLET, EXTENDED RELEASE ORAL at 09:02

## 2022-02-15 RX ADMIN — LISINOPRIL 40 MG: 20 TABLET ORAL at 09:02

## 2022-02-15 RX ADMIN — ENOXAPARIN SODIUM 40 MG: 40 INJECTION SUBCUTANEOUS at 09:02

## 2022-02-15 RX ADMIN — LEVOTHYROXINE SODIUM 75 MCG: 75 TABLET ORAL at 06:57

## 2022-02-15 RX ADMIN — ASPIRIN 81 MG 81 MG: 81 TABLET ORAL at 09:02

## 2022-02-15 RX ADMIN — SPIRONOLACTONE 25 MG: 25 TABLET ORAL at 09:02

## 2022-02-15 ASSESSMENT — PAIN SCALES - GENERAL: PAINLEVEL_OUTOF10: 0

## 2022-02-15 NOTE — PROGRESS NOTES
Pt informed this nurse that patient's BP was elevated. This nurse to bedside, /78 at this time. Physician aware. Patient received oral medications around 0900 today. Physician ordered to monitor BP in one hour.

## 2022-02-15 NOTE — PROGRESS NOTES
Patient awake in chair this morning. Patient has no complaints at this time. Morning medications provided and assessment completed. Call light and belongings within reach. PT at bedside for eval at this time.

## 2022-02-15 NOTE — H&P
Hospital Medicine  History and Physical    Patient:  Bo Murphy  MRN: [id number]    CHIEF COMPLAINT:    Chief Complaint   Patient presents with    Hypertension       History Obtained From:  Patient, EMR  Primary Care Physician: Matt Porras MD    HISTORY OF PRESENT ILLNESS:   The patient is a [de-identified] y.o. female with PMH of hypertension. Patient had a routine visit with her PCP yesterday morning. She did not take her morning BP meds. At the PCP office the patient was noted to have very high blood pressure and was sent to the emergency room where she was found to have a blood pressure of 221/107. Patient was given intravenous medication and the recommended to be observed over 24 hours. Patient denies any chest pain or palpitation. No headaches, loss of consciousness or seizure. No change in mental status. Patient is hearing deficit. Past Medical History:      Diagnosis Date    Allergic rhinitis     Asthma     uses flovent    Hearing loss     Hyperlipidemia     Hypertension     Hypothyroidism     Lichen planus     Dr. Alley Maldonado       Past Surgical History:      Procedure Laterality Date    HYSTERECTOMY, TOTAL ABDOMINAL      TONSILLECTOMY         Medications Prior to Admission:    Prior to Admission medications    Medication Sig Start Date End Date Taking?  Authorizing Provider   spironolactone (ALDACTONE) 25 MG tablet Take 1 tablet by mouth daily 2/16/22  Yes Ministerio Castellanos MD   lisinopril-hydroCHLOROthiazide (PRINZIDE;ZESTORETIC) 20-12.5 MG per tablet Take 1 tablet by mouth 2 times daily 2/15/22  Yes Ministerio Castellanos MD   aspirin 81 MG chewable tablet Take 1 tablet by mouth daily 11/26/21  Yes Indigo Rodriges MD   Artificial Tear Solution (TEARS AGAIN ADVANCED EYELID) SOLN Apply to eye   Yes Historical Provider, MD   FLOVENT HFA 44 MCG/ACT inhaler INHALE 1 PUFF BY MOUTH TWICE DAILY FOR 90 DAYS. 4/28/21  Yes Historical Provider, MD   metoprolol succinate (TOPROL XL) 100 MG extended release tablet Take 100 mg by mouth daily   Yes Historical Provider, MD   levothyroxine (SYNTHROID) 75 MCG tablet Take 75 mcg by mouth Daily   Yes Historical Provider, MD   atorvastatin (LIPITOR) 20 MG tablet Take 20 mg by mouth daily   Yes Historical Provider, MD       Allergies:  Albuterol, Amlodipine, Hydralazine, Metformin and related, and Zoster vac recomb adjuvanted    Social History:   TOBACCO:   reports that she has never smoked. She has never used smokeless tobacco.  ETOH:   reports no history of alcohol use. Family History:       Problem Relation Age of Onset    High Blood Pressure Mother        REVIEW OF SYSTEMS:  Ten systems reviewed and negative except for stated in HPI    Physical Exam:    Vitals: BP (!) 148/61   Pulse 75   Temp 97.7 °F (36.5 °C) (Oral)   Resp 18   Ht 5' 4\" (1.626 m)   Wt 108 lb 4.8 oz (49.1 kg)   SpO2 96%   BMI 18.59 kg/m²   General appearance: alert, appears stated age and cooperative, no apparent distress. Hearing loss. Skin: Skin color, texture, turgor normal. No rashes or lesions  HEENT: Head: Normocephalic, no lesions, without obvious abnormality. Neck: no adenopathy, no carotid bruit, no JVD, supple, symmetrical, trachea midline and thyroid not enlarged, symmetric, no tenderness/mass/nodules  Lungs: clear to auscultation bilaterally  Heart: regular rate and rhythm, S1, S2 normal, no murmur, click, rub or gallop  Abdomen: soft, non-tender; bowel sounds normal; no masses,  no organomegaly  Extremities: extremities normal, atraumatic, no cyanosis or edema  Neurologic: Mental status: Alert, oriented, thought content appropriate.      Recent Labs     02/14/22  1348 02/15/22  0710   WBC 5.6 4.4   HGB 12.9 11.8    195     Recent Labs     02/14/22  1348 02/15/22  0711    141   K 3.9 4.0    104   CO2 24 26   BUN 14 13   CREATININE 0.84 0.78   GLUCOSE 112* 122*   AST 19  --    ALT 8  --    BILITOT 0.7  --    ALKPHOS 77  --      Troponin T:   Recent Labs     02/14/22  1348 TROPONINI <0.010       ABGs: No results found for: PHART, PO2ART, NTA0ELA  INR:   Recent Labs     02/14/22  1348   INR 0.9     URINALYSIS:  Recent Labs     02/14/22  1537   COLORU Yellow   PHUR 5.5   WBCUA 3-5   RBCUA 0-2   BACTERIA FEW*   CLARITYU Clear   SPECGRAV 1.020   LEUKOCYTESUR Small   UROBILINOGEN 0.2   BILIRUBINUR Negative   BLOODU Negative   GLUCOSEU Negative     -----------------------------------------------------------------   CT Head WO Contrast    Result Date: 2/14/2022  CT OF THE BRAIN WITHOUT CONTRAST HISTORY: Vincent Urbina is a Female of [de-identified] years age with history of headache and hypertension. COMPARISON:  November 21, 2021. TECHNIQUE:  Spiral CT examination of the brain was performed without intravenous contrast.  Images were obtained from the base of the skull up to the convexities in axial slices, and then examined in the blood, brain parenchymal and bony windows. FINDINGS: There is motion artifact on this examination which limits the evaluation. .  There is no evidence for mass, mass effect or midline shift. No abnormal extra-axial fluid collections are appreciated. Age-appropriate cortical volume loss is seen. There are patchy areas of hypoattenuation in a periventricular distribution consistent with nonspecific ischemic small vessel disease. There are no acute parenchymal alterations, blood byproducts or abnormal extracerebral fluid. The calvarium is grossly  intact. The visualized paranasal sinuses are clear. 1.Cortical atrophy and periventricular microangiopathy. 2. No acute hemorrhage is seen. All CT scans at this facility use dose modulation, iterative reconstruction, and/or weight based dosing when appropriate to reduce radiation dose to as low as reasonably achievable. PORTABLE CHEST COMPARISON: June 12, 2021 HISTORY: Headache, hypertension TECHNIQUE: AP view FINDINGS: No consolidating pneumonia, pleural effusion or pneumothorax is seen.  In the right upper lobe there is scarring. The cardiac silhouette is within normal limits of size. The bony structures are grossly intact. IMPRESSION: No evidence of acute cardiopulmonary process     XR CHEST PORTABLE    Result Date: 2/14/2022  CT OF THE BRAIN WITHOUT CONTRAST HISTORY: Melanie Parnell is a Female of [de-identified] years age with history of headache and hypertension. COMPARISON:  November 21, 2021. TECHNIQUE:  Spiral CT examination of the brain was performed without intravenous contrast.  Images were obtained from the base of the skull up to the convexities in axial slices, and then examined in the blood, brain parenchymal and bony windows. FINDINGS: There is motion artifact on this examination which limits the evaluation. .  There is no evidence for mass, mass effect or midline shift. No abnormal extra-axial fluid collections are appreciated. Age-appropriate cortical volume loss is seen. There are patchy areas of hypoattenuation in a periventricular distribution consistent with nonspecific ischemic small vessel disease. There are no acute parenchymal alterations, blood byproducts or abnormal extracerebral fluid. The calvarium is grossly  intact. The visualized paranasal sinuses are clear. 1.Cortical atrophy and periventricular microangiopathy. 2. No acute hemorrhage is seen. All CT scans at this facility use dose modulation, iterative reconstruction, and/or weight based dosing when appropriate to reduce radiation dose to as low as reasonably achievable. PORTABLE CHEST COMPARISON: June 12, 2021 HISTORY: Headache, hypertension TECHNIQUE: AP view FINDINGS: No consolidating pneumonia, pleural effusion or pneumothorax is seen. In the right upper lobe there is scarring. The cardiac silhouette is within normal limits of size. The bony structures are grossly intact. IMPRESSION: No evidence of acute cardiopulmonary process           Assessment and Plan     *Hypertensive urgency, resolved.   Patient was found to have elevated blood pressure at her doctor's office yesterday morning. Patient admitted that she did not take her BP meds before she left home to see her PCP which is probably the cause of her hypertensive urgency. No ACS. No acute stroke. Negative troponin. No evidence of endorgan damage. Patient is already on Toprol and lisinopril. I added Aldactone 25 mg daily. I also requested metanephrine to rule out pheochromocytoma. Aldosterone and renin level to rule out hyperaldosterone anemia as a cause of her hypertensive urgency. I also changed her lisinopril to 20 mg to lisinopril HCTZ 20 mg/12.5 twice a day these results will need to be reviewed by her PCP when they become available. I do not believe that patient has renal artery stenosis as a cause of her hypertensive urgency given the fact that the patient has normal kidney function while she is on high dose of lisinopril. *Hypomagnesemia, corrected. *Hearing loss    *Multiple other medical problems not listed above    Patient Active Problem List   Diagnosis Code    Hypothyroidism E03.9    Allergic rhinitis J30.9    Hypertension I10    Hyperlipidemia Z95.3    Lichen planus L61.3    Hearing loss H91.90    Asthma J45.909    TIN (acute kidney injury) ([address] N17.9    Syncope R55    E. coli UTI N39.0, B96.20    AMS (altered mental status) R41.82    Cerebrovascular accident (CVA) ([facility] [address] I63.9    Hypertensive urgency I16.0       Nahed Taylor MD, MD  Admitting Hospitalist    TTS: 85mins where I focused more than 75% of my attention on rendering care, and planning treatment course for this patient, in addition to talking to RN team, mid levels, consulting with other physicians and following up on labs and imaging. High Risk Readmission Screening Tool Score Noted.      Emergency Contact:

## 2022-02-15 NOTE — DISCHARGE INSTR - COC
Continuity of Care Form    Patient Name: [place] Cote   :  1940  MRN:  [id number]    Admit date:  2022  Discharge date:  ***    Code Status Order: Full Code   Advance Directives:      Admitting Physician:  Nelia Zambrano MD  PCP: Judi Crabtree MD    Discharging Nurse: [facility] Unit/Room#: 0204/0204-01  Discharging Unit Phone Number: ***    Emergency Contact:   Extended Emergency Contact Information  Primary Emergency Contact: [address] Phone: [phone]  Mobile Phone: [phone]  Relation: Child  Secondary Emergency Contact: Tessa Carlos  Mobile Phone: [phone]  Relation: Child    Past Surgical History:  Past Surgical History:   Procedure Laterality Date    HYSTERECTOMY, TOTAL ABDOMINAL      TONSILLECTOMY         Immunization History:   Immunization History   Administered Date(s) Administered    COVID-19, Pfizer Purple top, DILUTE for use, 12+ yrs, 30mcg/0.3mL dose 2021, 09/15/2021    Influenza, High Dose (Fluzone 65 yrs and older) 2018       Active Problems:  Patient Active Problem List   Diagnosis Code    Hypothyroidism E03.9    Allergic rhinitis J30.9    Hypertension I10    Hyperlipidemia H54.2    Lichen planus C03.5    Hearing loss H91.90    Asthma J45.909    TIN (acute kidney injury) ([facility] [address] N17.9    Syncope R55    E. coli UTI N39.0, B96.20    AMS (altered mental status) R41.82    Cerebrovascular accident (CVA) ([facility] [address] I63.9    Hypertensive urgency I16.0       Isolation/Infection:   Isolation            No Isolation          Patient Infection Status       Infection Onset Added Last Indicated Last Indicated By Review Planned Expiration Resolved Resolved By    None active    Resolved    COVID-19 (Rule Out) 22 COVID-19, Rapid (Ordered)   22 Rule-Out Test Resulted            Nurse Assessment:  Last Vital Signs: BP (!) 148/61   Pulse 75   Temp 97.7 °F (36.5 °C) (Oral)   Resp 18   Ht 5' 4\" (1.626 m)   Wt 108 lb 4.8 oz (49.1 kg)   SpO2 96%   BMI 18.59 kg/m²     Last documented pain score (0-10 scale): Pain Level: 0  Last Weight:   Wt Readings from Last 1 Encounters:   22 108 lb 4.8 oz (49.1 kg)     Mental Status:  {IP PT MENTAL STATUS:}    IV Access:  { MADELAINE IV ACCESS:032630257}    Nursing Mobility/ADLs:  Walking   {CHP DME DXZE:225927793}  Transfer  {CHP DME TNPP:389556009}  Bathing  {CHP DME RKDW:295624310}  Dressing  {CHP DME IRXB:137420690}  Toileting  {CHP DME OBEX:699830556}  Feeding  {CHP DME XXFY:536353734}  Med Admin  {CHP DME ZEZ}  Med Delivery   { MADELAINE MED Delivery:235519394}    Wound Care Documentation and Therapy:        Elimination:  Continence: Bowel: {YES / CX:62287}  Bladder: {YES / HS:08896}  Urinary Catheter: {Urinary Catheter:039665266}   Colostomy/Ileostomy/Ileal Conduit: {YES / XH:98032}       Date of Last BM: ***    Intake/Output Summary (Last 24 hours) at 2/15/2022 0925  Last data filed at 2022 2241  Gross per 24 hour   Intake 240 ml   Output --   Net 240 ml     I/O last 3 completed shifts:   In: 240 [P.O.:240]  Out: -     Safety Concerns:     [address] [employer] Safety Concerns:004036165}    Impairments/Disabilities:      [address] [employer] Impairments/Disabilities:170311703}    Nutrition Therapy:  Current Nutrition Therapy:   [address] [employer] Diet List:604967643}    Routes of Feeding: {[facility] DME Other Feedings:647840174}  Liquids: {Slp liquid thickness:24235}  Daily Fluid Restriction: {CHP DME Yes amt example:793178271}  Last Modified Barium Swallow with Video (Video Swallowing Test): {Done Not Done HZQK:036217133}    Treatments at the Time of Hospital Discharge:   Respiratory Treatments: ***  Oxygen Therapy:  {Therapy; copd oxygen:58919}  Ventilator:    { CC Vent LHED:698210322}    Rehab Therapies: {THERAPEUTIC INTERVENTION:8047852204}  Weight Bearing Status/Restrictions: [address]RI[address] Weight Bearin}  Other Medical Equipment (for information only, NOT a DME order): {EQUIPMENT:363506628}  Other Treatments: ***    Patient's personal belongings (please select all that are sent with patient):  {CHP DME Belongings:575214497}    RN SIGNATURE:  {Esignature:520977458}    CASE MANAGEMENT/SOCIAL WORK SECTION    Inpatient Status Date: ***    Readmission Risk Assessment Score:  Readmission Risk              Risk of Unplanned Readmission:  0           Discharging to Facility/ Agency   Name:   Address:  Phone:  Fax:    Dialysis Facility (if applicable)   Name:  Address:  Dialysis Schedule:  Phone:  Fax:    / signature: {Esignature:154260564}    PHYSICIAN SECTION    Prognosis: Good    Condition at Discharge: Stable    Rehab Potential (if transferring to Rehab): Good    Recommended Labs or Other Treatments After Discharge:     Physician Certification: I certify the above information and transfer of Dulce George  is necessary for the continuing treatment of the diagnosis listed and that she requires Home Care for greater 30 days.      Update Admission H&P: No change in H&P    PHYSICIAN SIGNATURE:  Electronically signed by Sohail Lucia MD on 2/15/22 at 9:26 AM EST

## 2022-02-15 NOTE — PROGRESS NOTES
Pt pleasant. Able to make needs known however very [place]. B.p better . Last b.p 151/59. No needs at this time. Took HS pills whole with water. Bed alarm on and call light in reach.

## 2022-02-15 NOTE — PROGRESS NOTES
Discharge instructions reviewed with patient and son. Understanding verbalized. Patient requested that prescriptions be transferred to [facility] in [place]. Patient discharged from unit via wheelchair accompanied by nursing staff and son.

## 2022-02-15 NOTE — DISCHARGE SUMMARY
Hospital Medicine Discharge Summary    Tonya Avendaño  :  1940  MRN:  [id number]    Admit date:  2022  Discharge date:  2/15/2022    Admitting Physician:  Grover Cunningham MD  Primary Care Physician:  Rik Begum MD      Discharge Diagnoses:         *Hypertensive urgency, resolved. Patient was found to have elevated blood pressure at her doctor's office yesterday morning. Patient admitted that she did not take her BP meds before she left home to see her PCP which is probably the cause of her hypertensive urgency. No ACS. No acute stroke. Negative troponin. No evidence of endorgan damage. Patient is already on Toprol and lisinopril. I added Aldactone 25 mg daily. I also requested metanephrine to rule out pheochromocytoma. Aldosterone and renin level to rule out hyperaldosterone anemia as a cause of her hypertensive urgency. These results will need to be reviewed by her PCP when they become available. I also changed her lisinopril to 20 mg to lisinopril HCTZ 20 mg/12.5 twice a day. I do not believe that patient has renal artery stenosis as a cause of her hypertensive urgency given the fact that the patient has normal kidney function while she is on high dose of lisinopril.     *Hypomagnesemia, corrected.     *Hearing loss     *Multiple other medical problems not listed above    Patient has multiple medical issues. I do not have clear or strong clinical justification to extend inpatient hospitalization. Patient however would definitely need and require close and frequent monitoring as well as additional work-up, investigation and therapeutic intervention that potentially could take place in the outpatient setting by primary care doctor in collaboration with other specialists. That is to prevent relapse, decompensation, rehospitalization and other medical implications. I ordered home health care. Home care nurses to check her blood pressure and collaborate care with the PCP.   Her BP meds may need to be titrated further. Pending metanephrine, Aldactone and Renin levels are to be reviewed by PCP when they become available      Hospital Course:   Tab Tirado is a [de-identified] y.o. female that was admitted and treated at [facility] for the aforementioned medical issues under observation. This morning patient is feeling great. No evidence of endorgan damage. Patient will be discharged home. Patient was seen by the following consultants while admitted to [facility]:   Consults:  IP CONSULT TO HOSPITALIST    Significant Diagnostic Studies:    CT Head WO Contrast    Result Date: 2/14/2022  CT OF THE BRAIN WITHOUT CONTRAST HISTORY: Nick Cheng is a Female of [de-identified] years age with history of headache and hypertension. COMPARISON:  November 21, 2021. TECHNIQUE:  Spiral CT examination of the brain was performed without intravenous contrast.  Images were obtained from the base of the skull up to the convexities in axial slices, and then examined in the blood, brain parenchymal and bony windows. FINDINGS: There is motion artifact on this examination which limits the evaluation. .  There is no evidence for mass, mass effect or midline shift. No abnormal extra-axial fluid collections are appreciated. Age-appropriate cortical volume loss is seen. There are patchy areas of hypoattenuation in a periventricular distribution consistent with nonspecific ischemic small vessel disease. There are no acute parenchymal alterations, blood byproducts or abnormal extracerebral fluid. The calvarium is grossly  intact. The visualized paranasal sinuses are clear. 1.Cortical atrophy and periventricular microangiopathy. 2. No acute hemorrhage is seen. All CT scans at this facility use dose modulation, iterative reconstruction, and/or weight based dosing when appropriate to reduce radiation dose to as low as reasonably achievable.  PORTABLE CHEST COMPARISON: June 12, 2021 HISTORY: Headache, hypertension TECHNIQUE: AP view FINDINGS: No consolidating pneumonia, pleural effusion or pneumothorax is seen. In the right upper lobe there is scarring. The cardiac silhouette is within normal limits of size. The bony structures are grossly intact. IMPRESSION: No evidence of acute cardiopulmonary process     XR CHEST PORTABLE    Result Date: 2/14/2022  CT OF THE BRAIN WITHOUT CONTRAST HISTORY: Joann Schwab is a Female of [de-identified] years age with history of headache and hypertension. COMPARISON:  November 21, 2021. TECHNIQUE:  Spiral CT examination of the brain was performed without intravenous contrast.  Images were obtained from the base of the skull up to the convexities in axial slices, and then examined in the blood, brain parenchymal and bony windows. FINDINGS: There is motion artifact on this examination which limits the evaluation. .  There is no evidence for mass, mass effect or midline shift. No abnormal extra-axial fluid collections are appreciated. Age-appropriate cortical volume loss is seen. There are patchy areas of hypoattenuation in a periventricular distribution consistent with nonspecific ischemic small vessel disease. There are no acute parenchymal alterations, blood byproducts or abnormal extracerebral fluid. The calvarium is grossly  intact. The visualized paranasal sinuses are clear. 1.Cortical atrophy and periventricular microangiopathy. 2. No acute hemorrhage is seen. All CT scans at this facility use dose modulation, iterative reconstruction, and/or weight based dosing when appropriate to reduce radiation dose to as low as reasonably achievable. PORTABLE CHEST COMPARISON: June 12, 2021 HISTORY: Headache, hypertension TECHNIQUE: AP view FINDINGS: No consolidating pneumonia, pleural effusion or pneumothorax is seen. In the right upper lobe there is scarring. The cardiac silhouette is within normal limits of size. The bony structures are grossly intact.  IMPRESSION: No evidence of acute cardiopulmonary process       Discharge Medications:         Medication List      START taking these medications    lisinopril-hydroCHLOROthiazide 20-12.5 MG per tablet  Commonly known as: PRINZIDE;ZESTORETIC  Take 1 tablet by mouth 2 times daily     spironolactone 25 MG tablet  Commonly known as: ALDACTONE  Take 1 tablet by mouth daily  Start taking on: February 16, 2022        CONTINUE taking these medications    aspirin 81 MG chewable tablet  Take 1 tablet by mouth daily     atorvastatin 20 MG tablet  Commonly known as: LIPITOR     Flovent HFA 44 MCG/ACT inhaler  Generic drug: fluticasone     levothyroxine 75 MCG tablet  Commonly known as: SYNTHROID     metoprolol succinate 100 MG extended release tablet  Commonly known as: TOPROL XL     Tears Again Advanced Eyelid Soln        STOP taking these medications    lisinopril 40 MG tablet  Commonly known as: PRINIVIL;ZESTRIL           Where to Get Your Medications      These medications were sent to [address] [place] [address]    Phone: [phone]   · lisinopril-hydroCHLOROthiazide 20-12.5 MG per tablet  · spironolactone 25 MG tablet         Disposition:   Discharged to Home. Any C needs that were indicated and/or required as been addressed and set up by Social Work. Condition at discharge: Pt was medically stable at the time of discharge. Significant improvement in clinical condition compared to initial condition at presentation to hospital    Activity: activity as tolerated, fall precautions. Total time taken for discharging this patient: 40 minutes. Greater than 70% of time was spent focused exclusively on this patient. Time was taken to review chart, discuss plans with consultants, reconciling medications, discussing plan answering questions with patient.      Lillian Vasquez MD  2/15/2022, 9:29 AM  ----------------------------------------------------------------------------------------------------------------------    Reda Reaper,     Please return to ER or call 911 if you develop any significant signs or symptoms. I may not have addressed all of your medical illnesses or the abnormal blood work or imaging therefore please ask your PCP Lyric Ramirez MD and other out patient specialists and providers  to obtain [facility] record entirely to follow up on all of the abnormal labs, imaging and findings that I have and have not addressed during your hospitalization. Discharging you from the hospital does not mean that your medical care ends here and now. You may still need additional work up, investigation, monitoring, and treatment to be handled from this point on by outside providers including your PCP, Lyric Ramirez MD , Specialists and other healthcare providers. Please review your list of discharge medications prior to resuming medications you might still have at home, as the medications you need to be taking, dosages or how often you must take them may have changed. For medication questions, contact your retail pharmacy and your PCP, Lyric Ramirez MD .     ** I STRONGLY RECOMMEND that you follow up with Lyric Ramirez MD within 3 to 5 days for a post hospitalization evaluation. This specific office visit is covered by your insurance, and is not the same as your annual doctor visit/ check up. This office visit is important, as it may prevent need for repeat and/or future hospitalizations. **    Your medical team at [facility] ([facility]) appreciates the opportunity to work with you to get well! Sincerely,  Danisha Vázquez MD Follow up with Dr. Lyric Ramirez MD in the next 7 days or sooner if needed. Please return to ER or call 911 if you develop any significant signs or symptoms.     I may or may not have addressed all of your symptoms, medical issues,  Illnesses, or all of the abnormal blood work or imaging therefore please ask your PCP and other specialists to obtain [facility] record entirely to follow up on all of your symptoms, illnesses, abnormal labs, imaging and findings that I have and have not addressed during your hospitalization. Discharging you from the hospital does not mean that your medical care ends here and now. You may still need to have additional work up, investigation, monitoring, surveillance, and treatment to be handled from this point on by in the out patient setting by  out patient providers including your PCP, Specialists and other healthcare providers. For medication questions, contact your retail pharmacy and your PCP. Your medical team at [facility] ([facility]) appreciates the opportunity to work with you to get well!     Alysia Hall MD

## 2022-02-15 NOTE — PROGRESS NOTES
Called [facility]  in [place] to have prescriptions transferred to [facility] in [place] per patient's request.  [address] would not transfer prescriptions to

## 2022-02-15 NOTE — PROGRESS NOTES
Physical Therapy    Facility/Department: [facility] MED SURG UNIT  Initial Assessment    NAME: Marni Walker  : 1940  MRN: [id number]    Date of Service: 2/15/2022    Discharge Recommendations:  Continue to assess pending progress        Assessment   Body structures, Functions, Activity limitations: Decreased functional mobility ; Decreased balance;Decreased strength  Assessment: [de-identified]year old female adm with Htn demonstrating slight decreased dynamic balance during amb and would benefit from PT to address impairments and improve functional mobiliiy to allow safe D/C home  Treatment Diagnosis: weakness,decreased balance  Prognosis: Good  PT Education: Goals;PT Role;Plan of Care  REQUIRES PT FOLLOW UP: Yes       Patient Diagnosis(es): The primary encounter diagnosis was Hypertensive urgency. Diagnoses of Primary hypertension and Anxiety state were also pertinent to this visit. has a past medical history of Allergic rhinitis, Asthma, Hearing loss, Hyperlipidemia, Hypertension, Hypothyroidism, and Lichen planus. has a past surgical history that includes Hysterectomy, total abdominal and Tonsillectomy.     Restrictions  Restrictions/Precautions  Restrictions/Precautions: Fall Risk  Vision/Hearing  Vision: Within Functional Limits  Hearing: Exceptions to [facility]  Hearing Exceptions: Hard of hearing/hearing concerns     Subjective  General  Chart Reviewed: Yes  Family / Caregiver Present: No     Orientation  Orientation  Overall Orientation Status: Within Functional Limits  Social/Functional History  Social/Functional History  Lives With: Son (lives with 2 sons-one who comes and goes,the other one home all the time)  Type of Home: House  Home Layout: Multi-level,Able to Live on Main level with bedroom/bathroom  Home Access: Stairs to enter with rails  Entrance Stairs - Number of Steps: 3  Entrance Stairs - Rails: Both  Bathroom Shower/Tub: Tub/Shower unit (takes a bath)  Bathroom Toilet: Handicap height  Bathroom Equipment: Shower chair,Grab bars in shower  Home Equipment: Rolling walker,Cane  ADL Assistance: [address]: [address] [facility] Responsibilities: Yes  Ambulation Assistance: Independent  Transfer Assistance: Independent  Active : No  Patient's  Info: pt's sons drive pt to Drs appts and do the grocery shopping  Occupation: Retired    Objective     Observation/Palpation  Observation: R UE    AROM RLE (degrees)  RLE AROM: WFL  AROM LLE (degrees)  LLE AROM : WFL  Strength RLE  Comment: hip flex 4-/5, knee strength 4/5, ankle df 4/5  Strength LLE  Comment: hip flex 4-/5, knee strength 4/5, ankle df 4/5     Sensation  Overall Sensation Status: WFL  Bed mobility  Supine to Sit: Independent  Sit to Supine: Independent  Transfers  Sit to Stand: Stand by assistance;Supervision  Stand to sit: Stand by assistance;Supervision  Bed to Chair: Supervision;Stand by assistance  Comment: Mild impuslive behavior- quickly stands up and moves quickly during transfers;   Ambulation  Ambulation?: Yes  Ambulation 1  Surface: level tile  Device: No Device  Assistance: Stand by assistance;Contact guard assistance  Quality of Gait: slow pace, slight veering R and L occasionally,  no major LOB, near equal step length bilat  Gait Deviations: Slow Capri  Distance: 70 feet x 2, 50 feet x 2  Comments: Pt concerned about BP- BP assessed post amb- 197/74- nursing notified of BP reading  Stairs/Curb  Stairs?: Yes  Stairs  # Steps :  (3  4 inch steps 2 6 inch steps)  Rails: Bilateral  Device: No Device  Assistance: Contact guard assistance  Comment: Slow pace, reciprocal pattern ascending and descending 4 inch and 6 inch steps     Balance  Sitting - Static: Good  Sitting - Dynamic: Good  Standing - Static: Fair;+  Standing - Dynamic: [address] [facility]  Times per week: 5-7  Times per day:  (1-2)  Current Treatment Recommendations: Strengthening,Balance Training,Transfer Training,Functional Mobility Training,Gait Training,Stair training,Home Exercise Program,Safety Education & Training,Patient/Caregiver Education & Training  Safety Devices  Type of devices: Chair alarm in place,Call light within reach,Left in chair     Goals  Short term goals  Time Frame for Short term goals: 3 days  Short term goal 1: I transfers  Short term goal 2: Pt ambulate 150 feet without AD safely and I demonstrating good balance to allow safe I amb at home  Short term goal 3: Pt ascend and descend 3 steps with HR with supervision to allow safe entrance and exit out of home       Therapy Time   Individual Concurrent Group Co-treatment   Time In  910         Time Out  1000         Minutes  [facility] [place], [place], [place]

## 2022-02-15 NOTE — CARE COORDINATION
Spoke to patient about discharge home with [place] [phone]. Patient refused HHC. Patient stated \"I have stuff all over the place. I just move there. I don't want anyone coming to my home\". This CM tried to reinforce importance of safety and HHC . Patient still refusing [place] [address] visit. Physician and RN notified.

## 2022-02-15 NOTE — PROGRESS NOTES
Called [facility] in [place] to have prescriptions transferred to [facility] in [place] per patient's request.

## 2022-02-16 ENCOUNTER — TELEPHONE (OUTPATIENT)
Dept: FAMILY MEDICINE CLINIC | Age: 82
End: 2022-02-16

## 2022-02-16 NOTE — PROGRESS NOTES
Please refer to discharge summary for details. Given her labile blood pressure and the new blood pressure medications regimen I strongly recommended the patient to have home health care that is to monitor her blood pressure frequently and to collaborate with her PCP to titrate her BP meds accordingly to achieve optimal control over the next several weeks.       Unfortunately patient refused to have home health care understanding the potential risk and implications including but not limited to recurrent episodes of uncontrolled hypertension that could lead to heart attack, stroke and other dangerous health implications

## 2022-02-18 LAB
ALDOSTERONE/DIRECT RENIN CALCULATION: NORMAL RATIO (ref 0.1–3.7)
ALDOSTERONE: 6.2 NG/DL
RENIN PLASMA: <2.5 PG/ML (ref 2.5–45.7)

## 2022-02-19 LAB
METANEPH/PLASMA INTERP: NORMAL
METANEPHRINE FREE PLASMA: 0.14 NMOL/L (ref 0–0.49)
NORMETANEPHRINE FREE PLASMA: 0.51 NMOL/L (ref 0–0.89)

## 2022-02-21 NOTE — TELEPHONE ENCOUNTER
[facility] Initial Follow Up Call    Outreach made within 2 business days of discharge: Yes    Patient: Anat Luna Patient : 1940   MRN: [id number]  Reason for Admission: There are no discharge diagnoses documented for the most recent discharge.   Discharge Date: 2/15/22       Spoke with: Juanis Martins department/facility: med surg    Scheduled appointment with PCP within 7-14 days    Follow Up  Future Appointments   Date Time Provider Junaid Light   2022 11:00 AM Frank Collins MD [address] [place] [address]
Attempted to call Saira Mcdowell stating that DR Army Alaniz would see pt on the 23rd. Nothing further needed from me at this time.  If he would like to call back he can though
LMTCB
LMTCB 3rd attempt
Shaista Masterson pt's son returning your call, please call him back at [phone].
Tried calling Ghazala, contact listed in previous message, phone just rings busy. I tried calling patient as well, and left a voicemail to return call to schedule.
Scheduling)?  Yes

## 2022-02-23 ENCOUNTER — APPOINTMENT (OUTPATIENT)
Dept: GENERAL RADIOLOGY | Age: 82
End: 2022-02-23
Payer: MEDICARE

## 2022-02-23 ENCOUNTER — OFFICE VISIT (OUTPATIENT)
Dept: FAMILY MEDICINE CLINIC | Age: 82
End: 2022-02-23
Payer: COMMERCIAL

## 2022-02-23 ENCOUNTER — HOSPITAL ENCOUNTER (EMERGENCY)
Age: 82
Discharge: HOME | End: 2022-02-23
Attending: EMERGENCY MEDICINE
Payer: MEDICARE

## 2022-02-23 ENCOUNTER — TELEPHONE (OUTPATIENT)
Dept: FAMILY MEDICINE CLINIC | Age: 82
End: 2022-02-23

## 2022-02-23 VITALS
DIASTOLIC BLOOD PRESSURE: 80 MMHG | WEIGHT: 108 LBS | OXYGEN SATURATION: 94 % | SYSTOLIC BLOOD PRESSURE: 200 MMHG | HEIGHT: 64 IN | BODY MASS INDEX: 18.44 KG/M2 | HEART RATE: 74 BPM

## 2022-02-23 VITALS
OXYGEN SATURATION: 99 % | DIASTOLIC BLOOD PRESSURE: 78 MMHG | TEMPERATURE: 97.3 F | HEART RATE: 60 BPM | HEIGHT: 64 IN | WEIGHT: 108 LBS | SYSTOLIC BLOOD PRESSURE: 158 MMHG | BODY MASS INDEX: 18.44 KG/M2 | RESPIRATION RATE: 16 BRPM

## 2022-02-23 DIAGNOSIS — I16.1 HYPERTENSIVE EMERGENCY: Primary | ICD-10-CM

## 2022-02-23 DIAGNOSIS — I10 PRIMARY HYPERTENSION: Primary | ICD-10-CM

## 2022-02-23 LAB
ALBUMIN SERPL-MCNC: 4.1 G/DL (ref 3.5–4.6)
ALP BLD-CCNC: 75 U/L (ref 40–130)
ALT SERPL-CCNC: 8 U/L (ref 0–33)
ANION GAP SERPL CALCULATED.3IONS-SCNC: 13 MEQ/L (ref 9–15)
AST SERPL-CCNC: 26 U/L (ref 0–35)
BASOPHILS ABSOLUTE: 0 K/UL (ref 0–0.1)
BASOPHILS RELATIVE PERCENT: 0.4 % (ref 0.1–1.2)
BILIRUB SERPL-MCNC: 0.6 MG/DL (ref 0.2–0.7)
BUN BLDV-MCNC: 23 MG/DL (ref 8–23)
CALCIUM SERPL-MCNC: 9.6 MG/DL (ref 8.5–9.9)
CHLORIDE BLD-SCNC: 99 MEQ/L (ref 95–107)
CO2: 20 MEQ/L (ref 20–31)
CREAT SERPL-MCNC: 1.06 MG/DL (ref 0.5–0.9)
EKG ATRIAL RATE: 59 BPM
EKG P AXIS: 63 DEGREES
EKG P-R INTERVAL: 188 MS
EKG Q-T INTERVAL: 392 MS
EKG QRS DURATION: 72 MS
EKG QTC CALCULATION (BAZETT): 388 MS
EKG R AXIS: -20 DEGREES
EKG T AXIS: 39 DEGREES
EKG VENTRICULAR RATE: 59 BPM
EOSINOPHILS ABSOLUTE: 0.1 K/UL (ref 0–0.4)
EOSINOPHILS RELATIVE PERCENT: 1 % (ref 0.7–5.8)
GFR AFRICAN AMERICAN: >60
GFR NON-AFRICAN AMERICAN: 49.8
GLOBULIN: 4.4 G/DL (ref 2.3–3.5)
GLUCOSE BLD-MCNC: 126 MG/DL (ref 70–99)
HCT VFR BLD CALC: 36 % (ref 37–47)
HEMOGLOBIN: 12.5 G/DL (ref 11.2–15.7)
IMMATURE GRANULOCYTES #: 0 K/UL
IMMATURE GRANULOCYTES %: 0.2 %
LYMPHOCYTES ABSOLUTE: 0.7 K/UL (ref 1.2–3.7)
LYMPHOCYTES RELATIVE PERCENT: 14.7 %
MAGNESIUM: 1.8 MG/DL (ref 1.7–2.4)
MCH RBC QN AUTO: 32.1 PG (ref 25.6–32.2)
MCHC RBC AUTO-ENTMCNC: 34.7 % (ref 32.2–35.5)
MCV RBC AUTO: 92.5 FL (ref 79.4–94.8)
MONOCYTES ABSOLUTE: 0.4 K/UL (ref 0.2–0.9)
MONOCYTES RELATIVE PERCENT: 8.5 % (ref 4.7–12.5)
NEUTROPHILS ABSOLUTE: 3.6 K/UL (ref 1.6–6.1)
NEUTROPHILS RELATIVE PERCENT: 75.2 % (ref 34–71.1)
PDW BLD-RTO: 12.3 % (ref 11.7–14.4)
PLATELET # BLD: 209 K/UL (ref 182–369)
POTASSIUM SERPL-SCNC: 5.5 MEQ/L (ref 3.4–4.9)
POTASSIUM SERPL-SCNC: 5.8 MEQ/L (ref 3.4–4.9)
RBC # BLD: 3.89 M/UL (ref 3.93–5.22)
SODIUM BLD-SCNC: 132 MEQ/L (ref 135–144)
TOTAL PROTEIN: 8.5 G/DL (ref 6.3–8)
TROPONIN: <0.01 NG/ML (ref 0–0.01)
WBC # BLD: 4.8 K/UL (ref 4–10)

## 2022-02-23 PROCEDURE — 2580000003 HC RX 258: Performed by: EMERGENCY MEDICINE

## 2022-02-23 PROCEDURE — 99495 TRANSJ CARE MGMT MOD F2F 14D: CPT | Performed by: FAMILY MEDICINE

## 2022-02-23 PROCEDURE — 6360000002 HC RX W HCPCS: Performed by: EMERGENCY MEDICINE

## 2022-02-23 PROCEDURE — 84132 ASSAY OF SERUM POTASSIUM: CPT

## 2022-02-23 PROCEDURE — 85025 COMPLETE CBC W/AUTO DIFF WBC: CPT

## 2022-02-23 PROCEDURE — 96374 THER/PROPH/DIAG INJ IV PUSH: CPT

## 2022-02-23 PROCEDURE — 84484 ASSAY OF TROPONIN QUANT: CPT

## 2022-02-23 PROCEDURE — 93005 ELECTROCARDIOGRAM TRACING: CPT

## 2022-02-23 PROCEDURE — 80053 COMPREHEN METABOLIC PANEL: CPT

## 2022-02-23 PROCEDURE — 93010 ELECTROCARDIOGRAM REPORT: CPT | Performed by: INTERNAL MEDICINE

## 2022-02-23 PROCEDURE — 36415 COLL VENOUS BLD VENIPUNCTURE: CPT

## 2022-02-23 PROCEDURE — 6370000000 HC RX 637 (ALT 250 FOR IP): Performed by: EMERGENCY MEDICINE

## 2022-02-23 PROCEDURE — 71045 X-RAY EXAM CHEST 1 VIEW: CPT

## 2022-02-23 PROCEDURE — 99283 EMERGENCY DEPT VISIT LOW MDM: CPT

## 2022-02-23 PROCEDURE — 1111F DSCHRG MED/CURRENT MED MERGE: CPT | Performed by: FAMILY MEDICINE

## 2022-02-23 PROCEDURE — 83735 ASSAY OF MAGNESIUM: CPT

## 2022-02-23 RX ORDER — DEXTROSE AND SODIUM CHLORIDE 5; .2 G/100ML; G/100ML
INJECTION, SOLUTION INTRAVENOUS CONTINUOUS
Status: DISCONTINUED | OUTPATIENT
Start: 2022-02-23 | End: 2022-02-23 | Stop reason: HOSPADM

## 2022-02-23 RX ORDER — DEXTROSE AND SODIUM CHLORIDE 5; .45 G/100ML; G/100ML
INJECTION, SOLUTION INTRAVENOUS CONTINUOUS
Status: DISCONTINUED | OUTPATIENT
Start: 2022-02-23 | End: 2022-02-23

## 2022-02-23 RX ORDER — SODIUM CHLORIDE 0.9 % (FLUSH) 0.9 %
3 SYRINGE (ML) INJECTION EVERY 8 HOURS
Status: DISCONTINUED | OUTPATIENT
Start: 2022-02-23 | End: 2022-02-23 | Stop reason: HOSPADM

## 2022-02-23 RX ORDER — SODIUM POLYSTYRENE SULFONATE 15 G/60ML
15 SUSPENSION ORAL; RECTAL ONCE
Status: COMPLETED | OUTPATIENT
Start: 2022-02-23 | End: 2022-02-23

## 2022-02-23 RX ORDER — LORAZEPAM 2 MG/ML
0.5 INJECTION INTRAMUSCULAR ONCE
Status: COMPLETED | OUTPATIENT
Start: 2022-02-23 | End: 2022-02-23

## 2022-02-23 RX ORDER — SODIUM POLYSTYRENE SULFONATE 15 G/60ML
15 SUSPENSION ORAL; RECTAL ONCE
Status: DISCONTINUED | OUTPATIENT
Start: 2022-02-23 | End: 2022-02-23

## 2022-02-23 RX ORDER — LABETALOL HYDROCHLORIDE 5 MG/ML
10 INJECTION, SOLUTION INTRAVENOUS ONCE
Status: DISCONTINUED | OUTPATIENT
Start: 2022-02-23 | End: 2022-02-23

## 2022-02-23 RX ORDER — SODIUM CHLORIDE 450 MG/100ML
INJECTION, SOLUTION INTRAVENOUS CONTINUOUS
Status: DISCONTINUED | OUTPATIENT
Start: 2022-02-23 | End: 2022-02-23

## 2022-02-23 RX ADMIN — LORAZEPAM 0.5 MG: 2 INJECTION INTRAMUSCULAR; INTRAVENOUS at 13:02

## 2022-02-23 RX ADMIN — DEXTROSE AND SODIUM CHLORIDE: 5; 200 INJECTION, SOLUTION INTRAVENOUS at 14:40

## 2022-02-23 RX ADMIN — SODIUM POLYSTYRENE SULFONATE 15 G: 15 SUSPENSION ORAL; RECTAL at 14:41

## 2022-02-23 ASSESSMENT — ENCOUNTER SYMPTOMS
FACIAL SWELLING: 0
WHEEZING: 1
EYE DISCHARGE: 0
NAUSEA: 0
STRIDOR: 0
EYE PAIN: 0
CONSTIPATION: 0
COUGH: 0
VOICE CHANGE: 0
CHEST TIGHTNESS: 0
SINUS PRESSURE: 0
BLOOD IN STOOL: 0
ABDOMINAL PAIN: 0
COUGH: 0
CHEST TIGHTNESS: 0
BACK PAIN: 0
SHORTNESS OF BREATH: 0
WHEEZING: 0
DIARRHEA: 0
CHOKING: 0
SORE THROAT: 0
VOMITING: 0
CONSTIPATION: 1
EYE REDNESS: 0
TROUBLE SWALLOWING: 0
SHORTNESS OF BREATH: 0
SORE THROAT: 0

## 2022-02-23 NOTE — ED PROVIDER NOTES
[facility] ED  eMERGENCY dEPARTMENT eNCOUnter      Pt Name: Gordo Pablo  MRN: [id number]  [place] 1940  Date of evaluation: 2/23/2022  Provider: Elen Porras MD    CHIEF COMPLAINT       Chief Complaint   Patient presents with    Hypertension     high BP for a long time        HISTORY OF PRESENT ILLNESS   (Location/Symptom, Timing/Onset,Context/Setting, Quality, Duration, Modifying Factors, Severity)  Note limiting factors. Gordo Pablo is a [de-identified] y.o. female who presents to the emergency department patient well-known to review records emergency patient has a history of uncontrolled hypertension in the past admitted the 4 days ago to the hospital and was released the next day all the testing came back negative patient underwent a CT of the abdomen and pelvis and as well as CT of the head along with ultrasound of the kidneys patient has history of hypertension hyperlipidemia lichen planus hypothyroidism anxiety syncope and remote CVA patient has been taking lisinopril hydrochlorothiazide spironolactone Toprol- mg along with Synthroid went to see primary care doctor for follow-up the hospital noted to have a systolic blood pressure I sent it here for the evaluation    HPI    NursingNotes were reviewed. REVIEW OF SYSTEMS    (2-9 systems for level 4, 10 or more for level 5)     Review of Systems   Constitutional: Negative. Negative for activity change and fever. HENT: Negative for congestion, drooling, facial swelling, mouth sores, nosebleeds, sinus pressure, sore throat, trouble swallowing and voice change. Eyes: Negative for pain, discharge, redness and visual disturbance. Respiratory: Negative for cough, choking, chest tightness, shortness of breath, wheezing and stridor. Cardiovascular: Negative for chest pain, palpitations and leg swelling. Gastrointestinal: Negative for abdominal pain, blood in stool, constipation, diarrhea and vomiting.    Endocrine: Negative for cold atorvastatin (LIPITOR) 20 MG tablet Take 20 mg by mouth dailyHistorical Med             ALLERGIES     Albuterol, Amlodipine, Hydralazine, Metformin and related, and Zoster vac recomb adjuvanted    FAMILY HISTORY       Family History   Problem Relation Age of Onset    High Blood Pressure Mother           SOCIAL HISTORY       Social History     Socioeconomic History    Marital status: Single     Spouse name: None    Number of children: 2    Years of education: None    Highest education level: None   Occupational History    None   Tobacco Use    Smoking status: Never Smoker    Smokeless tobacco: Never Used   Substance and Sexual Activity    Alcohol use: No    Drug use: No    Sexual activity: Not Currently   Other Topics Concern    None   Social History Narrative    Has 2 sons. One of her sons lives with her in a mobile home. Social Determinants of Health     Financial Resource Strain: Low Risk     Difficulty of Paying Living Expenses: Not hard at all   Food Insecurity: No Food Insecurity    Worried About Running Out of Food in the Last Year: Never true    Genevieve of Food in the Last Year: Never true   Transportation Needs:     Lack of Transportation (Medical): Not on file    Lack of Transportation (Non-Medical):  Not on file   Physical Activity:     Days of Exercise per Week: Not on file    Minutes of Exercise per Session: Not on file   Stress:     Feeling of Stress : Not on file   Social Connections:     Frequency of Communication with Friends and Family: Not on file    Frequency of Social Gatherings with Friends and Family: Not on file    Attends [employer] Services: Not on file    Active Member of Clubs or Organizations: Not on file    Attends Club or Organization Meetings: Not on file    Marital Status: Not on file   Intimate Partner Violence:     Fear of Current or Ex-Partner: Not on file    Emotionally Abused: Not on file    Physically Abused: Not on file    Sexually Abused: Not on file   Housing Stability:     Unable to Pay for Housing in the Last Year: Not on file    Number of Daniel in the Last Year: Not on file    Unstable Housing in the Last Year: Not on file       SCREENINGS   NIH Stroke Scale  Interval: Baseline  Level of Consciousness (1a. ): Alert  LOC Questions (1b. ): Answers both correctly  LOC Commands (1c. ): Performs both tasks correctly  Best Gaze (2. ): Normal  Visual (3. ): No visual loss  Facial Palsy (4. ): Normal symmetrical movement  Motor Arm, Left (5a. ): No drift  Motor Arm, Right (5b. ): No drift  Motor Leg, Left (6a. ): No drift  Motor Leg, Right (6b. ): No drift  Limb Ataxia (7. ): Absent  Sensory (8. ): Normal  Best Language (9. ): No aphasia  Dysarthria (10. ): Normal  Extinction and Inattention (11): No abnormality  Total: 0  @FLOW([id number])@      PHYSICAL EXAM    (up to 7 for level 4, 8 or more for level 5)     ED Triage Vitals [02/23/22 1210]   BP Temp Temp Source Pulse Resp SpO2 Height Weight   (!) 224/84 97.3 °F (36.3 °C) Temporal 78 16 (!) 86 % 5' 4\" (1.626 m) 108 lb (49 kg)       Physical Exam  Constitutional:       Appearance: She is well-developed. HENT:      Head: Normocephalic. Eyes:      General:         Right eye: No discharge. Left eye: No discharge. Cardiovascular:      Rate and Rhythm: Normal rate. Heart sounds: Normal heart sounds. No murmur heard. No gallop. Pulmonary:      Effort: No respiratory distress. Breath sounds: Normal breath sounds. No wheezing. Abdominal:      General: Bowel sounds are normal.      Palpations: Abdomen is soft. There is no mass. Tenderness: There is no rebound. Musculoskeletal:         General: No tenderness. Normal range of motion. Cervical back: Neck supple. Skin:     General: Skin is warm. Coloration: Skin is not jaundiced. Findings: No bruising, erythema, lesion or rash.    Neurological:      Mental Status: She is alert and oriented to person, place, and time. Cranial Nerves: No cranial nerve deficit. Sensory: No sensory deficit. Motor: No weakness or abnormal muscle tone. Coordination: Coordination normal.      Gait: Gait normal.      Deep Tendon Reflexes: Reflexes normal.   Psychiatric:         Behavior: Behavior normal.         Thought Content: Thought content normal.         DIAGNOSTIC RESULTS     EKG: All EKG's are interpreted by the Emergency Department Physician who either signs or Co-signsthis chart in the absence of a cardiologist.        RADIOLOGY:   Non-plain filmimages such as CT, Ultrasound and MRI are read by the radiologist. Plain radiographic images are visualized and preliminarily interpreted by the emergency physician with the below findings:        Interpretation per the Radiologist below, if available at the time ofthis note:    XR CHEST PORTABLE   Final Result   NO ACUTE CARDIOPULMONARY DISEASE. ED BEDSIDE ULTRASOUND:   Performed by ED Physician - none    LABS:  Labs Reviewed   COMPREHENSIVE METABOLIC PANEL - Abnormal; Notable for the following components:       Result Value    Sodium 132 (*)     Potassium 5.5 (*)     Glucose 126 (*)     CREATININE 1.06 (*)     GFR Non- 49.8 (*)     Total Protein 8.5 (*)     Globulin 4.4 (*)     All other components within normal limits   CBC WITH AUTO DIFFERENTIAL - Abnormal; Notable for the following components:    RBC 3.89 (*)     Hematocrit 36.0 (*)     Neutrophils % 75.2 (*)     Lymphocytes Absolute 0.7 (*)     All other components within normal limits   POTASSIUM - Abnormal; Notable for the following components:    Potassium 5.8 (*)     All other components within normal limits   MAGNESIUM   TROPONIN       All other labs were within normal range or not returned as of this dictation.     EMERGENCY DEPARTMENT COURSE and DIFFERENTIAL DIAGNOSIS/MDM:   Vitals:    Vitals:    02/23/22 1345 02/23/22 1400 02/23/22 1415 02/23/22 1500   BP: 129/72 136/64 130/69 (!) 158/78   Pulse: 61 61 62 60   Resp: 14 16 16    Temp:       TempSrc:       SpO2: 99%      Weight:       Height:           MDM  Number of Diagnoses or Management Options  Primary hypertension  Diagnosis management comments: On arrival to has a cold upper extremities pulse ox initially 86 patient has no short of breath no chest pain both hands are warm with warm blanket and pulse ox picked up 96% saturation patient noted slightly anxious but denies any anxiety  at the bedside patient blood pressure 41 no on any blood pressure medication came down nicely patient EKG performed on arrival sinus bradycardia rate of 59/min CT interval 188 ms QRS duration 70 ms and QT interval 392 ms patient has no ischemia no ST elevation or PVCs PVCs on EKG slight artifact due to the shakiness noted,,     NOTE, patient is a difficult stick initial study specimen hemolyzed 2nd specimen also since exposure patient bronchiolitic and some fluid patient has no EKG changes asymptomatic    CRITICAL CARE TIME   Total Critical Care time was  minutes, excluding separately reportableprocedures. There was a high probability of clinicallysignificant/life threatening deterioration in the patient's condition which required my urgent intervention. CONSULTS:  None    PROCEDURES:  Unless otherwise noted below, none     Procedures    FINAL IMPRESSION      1.  Primary hypertension          DISPOSITION/PLAN   DISPOSITION        PATIENT REFERRED TO:  Bryon Monet MD  [facility] [phone]    In 2 days  For blood pressure check      DISCHARGE MEDICATIONS:  Discharge Medication List as of 2/23/2022  2:15 PM             (Please note that portions of this note were completed with a voice recognition program.  Efforts were made to edit the dictations but occasionally words are mis-transcribed.)    Montana Siddiqui MD (electronically signed)  Attending Emergency Physician       Montana Siddiqui MD  02/23/22 9740

## 2022-03-11 NOTE — TELEPHONE ENCOUNTER
patient requesting medication refill.  Please approve or deny this request.    Rx requested:  Requested Prescriptions     Pending Prescriptions Disp Refills    spironolactone (ALDACTONE) 25 MG tablet 30 tablet 3     Sig: Take 1 tablet by mouth daily    levothyroxine (SYNTHROID) 75 MCG tablet 30 tablet      Sig: Take 1 tablet by mouth Daily    atorvastatin (LIPITOR) 20 MG tablet 30 tablet      Sig: Take 1 tablet by mouth daily         Last Office Visit:   2/14/2022      Next Visit Date:  Future Appointments   Date Time Provider Junaid Light   3/15/2022  1:00 PM Marina Aragon [address]   5/5/2022  9:00 AM [address]

## 2022-03-11 NOTE — TELEPHONE ENCOUNTER
1st attempt to reach patient. Called patient @[phone] and left message on machine for patient to return call during normal business hours of 8:30 AM and 5 PM @ [phone] option 2.

## 2022-03-11 NOTE — TELEPHONE ENCOUNTER
----- Message from [employer] sent at 3/8/2022  3:00 PM EST -----  Subject: Message to Provider    QUESTIONS  Information for Provider? Patient's son Vinny Topete would like a call back to   discuss her medications as she is out of several and no appointment   available until 5/5  ---------------------------------------------------------------------------  --------------  [address]  What is the best way for the office to contact you? OK to leave message on   voicemail  Preferred Call Back Phone Number? [phone]  ---------------------------------------------------------------------------  --------------  SCRIPT ANSWERS  Relationship to Patient? Third Party  Representative Name?  Vinny Topete

## 2022-03-12 RX ORDER — ATORVASTATIN CALCIUM 20 MG/1
20 TABLET, FILM COATED ORAL DAILY
Qty: 30 TABLET | Refills: 1 | Status: SHIPPED | OUTPATIENT
Start: 2022-03-12 | End: 2022-05-18 | Stop reason: SDUPTHER

## 2022-03-12 RX ORDER — LEVOTHYROXINE SODIUM 0.07 MG/1
75 TABLET ORAL DAILY
Qty: 30 TABLET | Refills: 1 | Status: SHIPPED | OUTPATIENT
Start: 2022-03-12 | End: 2022-06-09

## 2022-03-14 RX ORDER — SPIRONOLACTONE 25 MG/1
25 TABLET ORAL DAILY
Qty: 30 TABLET | Refills: 3 | OUTPATIENT
Start: 2022-03-14

## 2022-03-14 NOTE — TELEPHONE ENCOUNTER
2nd attempt to reach patient. Called patient @ [phone] and left message on machine for patient to return call during normal business hours of 8:30 AM and 5 PM @ [phone] option 2.

## 2022-03-14 NOTE — TELEPHONE ENCOUNTER
This was last prescribed in the emergency room. I have not prescribed this medication before. Last potassium levels noted were elevated and they have not been rechecked.   Therefore I would want potassium levels to be rechecked and for her to follow-up with cardiology to deem if this medication is appropriate

## 2022-03-15 ENCOUNTER — HOSPITAL ENCOUNTER (OUTPATIENT)
Dept: LAB | Age: 82
Discharge: HOME | End: 2022-03-15
Payer: MEDICARE

## 2022-03-15 ENCOUNTER — OFFICE VISIT (OUTPATIENT)
Dept: CARDIOLOGY CLINIC | Age: 82
End: 2022-03-15
Payer: MEDICARE

## 2022-03-15 VITALS
HEIGHT: 64 IN | BODY MASS INDEX: 18.78 KG/M2 | HEART RATE: 72 BPM | DIASTOLIC BLOOD PRESSURE: 90 MMHG | SYSTOLIC BLOOD PRESSURE: 220 MMHG | WEIGHT: 110 LBS | RESPIRATION RATE: 18 BRPM

## 2022-03-15 DIAGNOSIS — I16.1 HYPERTENSIVE EMERGENCY: ICD-10-CM

## 2022-03-15 DIAGNOSIS — E78.5 DYSLIPIDEMIA: ICD-10-CM

## 2022-03-15 DIAGNOSIS — E03.9 HYPOTHYROIDISM, UNSPECIFIED TYPE: ICD-10-CM

## 2022-03-15 DIAGNOSIS — I16.0 HYPERTENSIVE URGENCY: ICD-10-CM

## 2022-03-15 DIAGNOSIS — Z86.73 HISTORY OF TRANSIENT ISCHEMIC ATTACK (TIA): ICD-10-CM

## 2022-03-15 DIAGNOSIS — I63.89 CEREBROVASCULAR ACCIDENT (CVA) DUE TO OTHER MECHANISM (HCC): ICD-10-CM

## 2022-03-15 LAB
ALBUMIN SERPL-MCNC: 4.5 G/DL (ref 3.5–4.6)
ALP BLD-CCNC: 80 U/L (ref 40–130)
ALT SERPL-CCNC: 8 U/L (ref 0–33)
ANION GAP SERPL CALCULATED.3IONS-SCNC: 17 MEQ/L (ref 9–15)
AST SERPL-CCNC: 17 U/L (ref 0–35)
BILIRUB SERPL-MCNC: 0.5 MG/DL (ref 0.2–0.7)
BUN BLDV-MCNC: 24 MG/DL (ref 8–23)
CALCIUM SERPL-MCNC: 9.9 MG/DL (ref 8.5–9.9)
CHLORIDE BLD-SCNC: 101 MEQ/L (ref 95–107)
CO2: 19 MEQ/L (ref 20–31)
CREAT SERPL-MCNC: 0.96 MG/DL (ref 0.5–0.9)
GFR AFRICAN AMERICAN: >60
GFR NON-AFRICAN AMERICAN: 55.9
GLOBULIN: 4.5 G/DL (ref 2.3–3.5)
GLUCOSE BLD-MCNC: 99 MG/DL (ref 70–99)
POTASSIUM SERPL-SCNC: 5.5 MEQ/L (ref 3.4–4.9)
SODIUM BLD-SCNC: 137 MEQ/L (ref 135–144)
T4 FREE: 1.39 NG/DL (ref 0.84–1.68)
TOTAL PROTEIN: 9 G/DL (ref 6.3–8)
TSH SERPL DL<=0.05 MIU/L-ACNC: 4.5 UIU/ML (ref 0.44–3.86)

## 2022-03-15 PROCEDURE — 99204 OFFICE O/P NEW MOD 45 MIN: CPT | Performed by: PHYSICIAN ASSISTANT

## 2022-03-15 PROCEDURE — 84439 ASSAY OF FREE THYROXINE: CPT

## 2022-03-15 PROCEDURE — 83835 ASSAY OF METANEPHRINES: CPT

## 2022-03-15 PROCEDURE — 36415 COLL VENOUS BLD VENIPUNCTURE: CPT

## 2022-03-15 PROCEDURE — 84443 ASSAY THYROID STIM HORMONE: CPT

## 2022-03-15 PROCEDURE — 80053 COMPREHEN METABOLIC PANEL: CPT

## 2022-03-15 RX ORDER — LISINOPRIL 40 MG/1
40 TABLET ORAL DAILY
Qty: 30 TABLET | Refills: 3 | Status: SHIPPED | OUTPATIENT
Start: 2022-03-15 | End: 2022-04-11 | Stop reason: SDUPTHER

## 2022-03-15 RX ORDER — CLONIDINE HYDROCHLORIDE 0.1 MG/1
0.1 TABLET ORAL 2 TIMES DAILY
Qty: 60 TABLET | Refills: 3 | Status: SHIPPED | OUTPATIENT
Start: 2022-03-15 | End: 2022-03-22

## 2022-03-15 RX ORDER — CARVEDILOL 12.5 MG/1
12.5 TABLET ORAL 2 TIMES DAILY
Qty: 30 TABLET | Refills: 5 | Status: SHIPPED | OUTPATIENT
Start: 2022-03-15 | End: 2022-03-22

## 2022-03-15 ASSESSMENT — ENCOUNTER SYMPTOMS
SHORTNESS OF BREATH: 0
ABDOMINAL DISTENTION: 0
COLOR CHANGE: 0
CHEST TIGHTNESS: 0
VOMITING: 0
NAUSEA: 0

## 2022-03-15 NOTE — TELEPHONE ENCOUNTER
Pt aware of PCP not  prescribing medication from the ED. Has an appointment today 3/15/2022, will discuss with cardio.

## 2022-03-15 NOTE — TELEPHONE ENCOUNTER
Pt aware of  Not prescribing medication. Has an appointment today 3/15/2022, will discuss with cardio.

## 2022-03-15 NOTE — TELEPHONE ENCOUNTER
2nd attempt to reach patient. Called patient @ [phone] and left message on machine for patient to return call during normal business hours of 8:30 AM and 5 PM @ [phone] option 2.

## 2022-03-15 NOTE — PROGRESS NOTES
Patient: Joao Crook  YOB: 1940  MRN: [id number]    Chief Complaint:  Chief Complaint   Patient presents with    New Patient     referral per Dr Segundo Quiroga Hypertension         Subjective/HPI        3/15/22: This is a very pleasant 78-year-old  female with past medical history significant for hypertension, dyslipidemia, history of TIA in November 2021 and hypothyroidism who presents for initial cardiac evaluation following referral by PCP due to uncontrolled hypertension. Patient is currently on Toprol- mg p.o. daily, Aldactone 25 mg p.o. daily and lisinopril 20 mg p.o. daily. She states that she previously took lisinopril/hydrochlorothiazide but had an allergic reaction due to hydrochlorothiazide. She subsequently went back to her previous dose of lisinopril 20. She initially saw Dr. Chrissy Redd on 2/14/2022 to establish with primary care physician. She was noted at that time to have significantly elevated blood pressure at 240/100 and was advised to present to ER following that office appointment. Blood work was also ordered including TSH, plasma metanephrines and CMP. During ER visit on 2/14/2022, patient was treated with labetalol and Ativan after which her blood pressure began improving. She was admitted to [facility] for observation and further BP management. Her lisinopril was changed to lisinopril hydrochlorothiazide 20/12.5 mg twice daily during this hospital admission and she was also added on Aldactone 25 mg daily. She was subsequently discharged home on 2/15/2022 in stable condition. She later had hospital follow-up appointment with primary care physician on 2/23/2022. At this office visit, patient again noted to have severely elevated blood pressure at 200/80 on initial evaluation and repeat BP of 210/85. Patient again advised ER at that time.   During her ER evaluation on 2/23/2022 it does not appear any medication adjustments were made and blood pressure later improved and she was discharged home with recommendation for outpatient follow-up with PCP. She was later referred to cardiology for further evaluation. At time of evaluation today, patient presents with her son. She is extremely hard of hearing so history obtained from both patient and son. Patient's blood pressure is extremely elevated in office today at 216/102 on the left and 220/90 on the right. Patient is completely asymptomatic. She has no neurologic complaints including headache, dizziness, lightheadedness, blurred vision, facial droop, slurred speech or any unilateral weakness. She denies chest pain, shortness of breath, palpitations, lower extremity edema, syncope, fever or chills. Given significantly elevated blood pressure, I recommended presenting to ER for further evaluation and management however patient and son declining at this time. Patient states that she has gone to ER before and nothing has been done for her. Patient and son are agreeable to make medication adjustments including discontinuing Toprol and starting carvedilol 12.5 mg p.o. twice daily, increasing lisinopril to 40 mg p.o. daily and adding clonidine 0.1 mg p.o. twice daily. I have advised patient and son that if patient develops any neurologic symptoms including headache, dizziness, slurred speech, blurred vision, facial droop or weakness she is to present immediately to the ER. Patient and son agreeable to plan. Patient denies any prior cardiac history including myocardial infarction, congestive heart failure or arrhythmia. She has had no prior stress test or cardiac catheterization. I manually repeated blood pressure at the end of our visit today and blood pressure in left arm had improved to 180/88. Most recent diagnostic testing reviewed and documented below.     EKG 2/23/22: SB 59, baseline artifact, no acute ischemic changes, QTc 388ms      CT Brain 2/14/22:  Impression 1.Cortical atrophy and periventricular microangiopathy.       2. No acute hemorrhage is seen. All CT scans at this facility use dose modulation, iterative reconstruction, and/or weight based dosing when appropriate to reduce radiation dose to as low as reasonably achievable.       PORTABLE CHEST       COMPARISON: June 12, 2021       HISTORY: Headache, hypertension       TECHNIQUE: AP view           FINDINGS:   No consolidating pneumonia, pleural effusion or pneumothorax is seen. In the right upper lobe there is scarring. The cardiac silhouette is within normal limits of size. The bony structures are grossly intact.       IMPRESSION: No evidence of acute cardiopulmonary process         CTA Neck 11/23/21:  IMPRESSION: There is increased tortuosity of the visualized vessels and atherosclerosis.       There are no hemodynamically significant stenoses of the right internal carotid artery.       There is 60% stenosis of proximal left internal carotid artery. CTA Head on 11/23/21:  Impression   The major intracranial arterial structures are patent without high-grade stenosis, large vessel cut off, or aneurysm. MAIK 11/24/21:  Conclusions   Summary   Left ventricular ejection fraction is visually estimated at 60%. No left ventricular thrombus was seen. Normal LV size   Left atrial appendage well visualized. Normal size with reduced Doppler   velocities. No evidence of thrombus or spontaneous contrast noted within   the JUANITO. No evidence of mass or thrombus within left atrium. No PFO or ASD negative bubble study   Aortic valve leaflets are mildly thickened. Aortic root is somewhat fibrocalcified.       Signature   ----------------------------------------------------------------   Electronically signed by Karlo Torres(Interpreting physician)   on 11/25/2021 08:03 PM   ----------------------------------------------------------------      Most recent labs reviewed and documented below  CMP 2/23/2022: Sodium low 132, potassium elevated at 5.5 but specimen hemolyzed, chloride 99, total CO2 20, BUN 23, creatinine 1.06, GFR 49.8, glucose 126  BMP 2/15/2022: Sodium 141, potassium 4.0, chloride 104, total CO2 26, BUN 13, creatinine 0.78, GFR greater than 60, glucose 122  CBC 2/15/2022: WBC 4.4, hemoglobin 11.8, hematocrit 33.1, platelets 465  Magnesium 2/15/2022: 1.8  Free metanephrine on 2/15/2022: Within normal range at 0.14  Free normetanephrine on 2/15/2022: Within normal range at 0.51      Blood pressure significantly elevated in office today at 216/102 on the left and 220/90 on the right. Heart rate 72 bpm, pulse ox of 97% on room air. Weight is 110 pounds. **I manually repeated blood pressure and left arm prior to patient leaving office today and BP had improved to 180/88.     PMHx:  HTN  Hx TIA in 11/2021  Dyslipidemia  Hypothyroidism  Borderline DM  No known CAD    PSHx:  Ear surgery  Hysterectomy  Tonsillectomy    Social Hx:  No tobacco  No alcohol  No drugs      Family Hx:  No known CAD    Allergies   Allergen Reactions    Albuterol     Amlodipine     Hydralazine Itching    Metformin And Related     Zoster Vac Recomb Adjuvanted Itching     Shingles Vaccine        Current Outpatient Medications   Medication Sig Dispense Refill    lisinopril (PRINIVIL;ZESTRIL) 40 MG tablet Take 1 tablet by mouth daily 30 tablet 3    carvedilol (COREG) 12.5 MG tablet Take 1 tablet by mouth 2 times daily 30 tablet 5    cloNIDine (CATAPRES) 0.1 MG tablet Take 1 tablet by mouth 2 times daily 60 tablet 3    levothyroxine (SYNTHROID) 75 MCG tablet Take 1 tablet by mouth Daily 30 tablet 1    atorvastatin (LIPITOR) 20 MG tablet Take 1 tablet by mouth daily 30 tablet 1    spironolactone (ALDACTONE) 25 MG tablet Take 1 tablet by mouth daily 30 tablet 3    aspirin 81 MG chewable tablet Take 1 tablet by mouth daily 30 tablet 3    Artificial Tear Solution (TEARS AGAIN ADVANCED EYELID) SOLN Apply to eye      FLOVENT HFA 44 MCG/ACT inhaler INHALE 1 PUFF BY MOUTH TWICE DAILY FOR 90 DAYS. No current facility-administered medications for this visit. Past Medical History:   Diagnosis Date    Allergic rhinitis     Asthma     uses flovent    Hearing loss     Hyperlipidemia     Hypertension     Hypothyroidism     Lichen planus     Dr. Naomy Heck       Past Surgical History:   Procedure Laterality Date    HYSTERECTOMY, TOTAL ABDOMINAL      TONSILLECTOMY         Social History     Socioeconomic History    Marital status: Single     Spouse name: None    Number of children: 2    Years of education: None    Highest education level: None   Occupational History    None   Tobacco Use    Smoking status: Never Smoker    Smokeless tobacco: Never Used   Substance and Sexual Activity    Alcohol use: No    Drug use: No    Sexual activity: Not Currently   Other Topics Concern    None   Social History Narrative    Has 2 sons. One of her sons lives with her in a mobile home. Social Determinants of Health     Financial Resource Strain: Low Risk     Difficulty of Paying Living Expenses: Not hard at all   Food Insecurity: No Food Insecurity    Worried About Running Out of Food in the Last Year: Never true    Genevieve of Food in the Last Year: Never true   Transportation Needs:     Lack of Transportation (Medical): Not on file    Lack of Transportation (Non-Medical):  Not on file   Physical Activity:     Days of Exercise per Week: Not on file    Minutes of Exercise per Session: Not on file   Stress:     Feeling of Stress : Not on file   Social Connections:     Frequency of Communication with Friends and Family: Not on file    Frequency of Social Gatherings with Friends and Family: Not on file    Attends [employer] Services: Not on file    Active Member of Clubs or Organizations: Not on file    Attends Club or Organization Meetings: Not on file    Marital Status: Not on file   Intimate Partner Violence:     Fear of Current or Ex-Partner: Not on file    Emotionally Abused: Not on file    Physically Abused: Not on file    Sexually Abused: Not on file   Housing Stability:     Unable to Pay for Housing in the Last Year: Not on file    Number of Places Lived in the Last Year: Not on file    Unstable Housing in the Last Year: Not on file       Family History   Problem Relation Age of Onset    High Blood Pressure Mother          Review of Systems:   Review of Systems   Constitutional: Negative for activity change, appetite change, fatigue and fever. HENT: Negative for congestion. Respiratory: Negative for chest tightness and shortness of breath. Cardiovascular: Negative for chest pain, palpitations and leg swelling. Occasional orthopnea; No PND   Gastrointestinal: Negative for abdominal distention, nausea and vomiting. Occasional heartburn with certain foods   Genitourinary: Negative for difficulty urinating. Musculoskeletal: Negative for arthralgias. Skin: Negative for color change. Neurological: Positive for headaches (occasional--not today). Negative for dizziness, syncope and light-headedness. Psychiatric/Behavioral: Negative for agitation. Physical Examination:    BP (!) 220/90 (Site: Right Upper Arm, Position: Sitting, Cuff Size: Small Adult)   Pulse 72   Resp 18   Ht 5' 4\" (1.626 m)   Wt 110 lb (49.9 kg)   BMI 18.88 kg/m²    Physical Exam  Constitutional:       General: She is not in acute distress. Appearance: Normal appearance. Comments: Extremely [place]   HENT:      Head: Normocephalic and atraumatic. Cardiovascular:      Rate and Rhythm: Normal rate and regular rhythm. Heart sounds: No murmur heard. Pulmonary:      Effort: Pulmonary effort is normal. No respiratory distress. Breath sounds: No wheezing, rhonchi or rales. Abdominal:      Palpations: Abdomen is soft. Tenderness: There is no abdominal tenderness.    Musculoskeletal:         General: Normal range of motion. Right lower leg: No edema. Left lower leg: No edema. Skin:     General: Skin is warm and dry. Neurological:      General: No focal deficit present. Mental Status: She is alert and oriented to person, place, and time. Cranial Nerves: No cranial nerve deficit.    Psychiatric:         Mood and Affect: Mood normal.         Behavior: Behavior normal.         LABS:  CBC:   Lab Results   Component Value Date    WBC 4.8 02/23/2022    RBC 3.89 02/23/2022    HGB 12.5 02/23/2022    HCT 36.0 02/23/2022    MCV 92.5 02/23/2022    MCH 32.1 02/23/2022    MCHC 34.7 02/23/2022    RDW 12.3 02/23/2022     02/23/2022    MPV 9.7 09/07/2014     Lipids:  Lab Results   Component Value Date    CHOL 110 11/24/2021    CHOL 171 09/07/2014     Lab Results   Component Value Date    TRIG 95 11/24/2021    TRIG 167 09/07/2014     Lab Results   Component Value Date    HDL 37 (L) 11/24/2021    HDL 69 (H) 09/07/2014     Lab Results   Component Value Date    LDLCALC 54 11/24/2021    LDLCALC 69 09/07/2014     No results found for: LABVLDL, VLDL  No results found for: CHOLHDLRATIO  CMP:    Lab Results   Component Value Date     02/23/2022    K 5.8 02/23/2022    K 4.0 02/15/2022    CL 99 02/23/2022    CO2 20 02/23/2022    BUN 23 02/23/2022    CREATININE 1.06 02/23/2022    GFRAA >60.0 02/23/2022    LABGLOM 49.8 02/23/2022    GLUCOSE 126 02/23/2022    PROT 8.5 02/23/2022    LABALBU 4.1 02/23/2022    CALCIUM 9.6 02/23/2022    BILITOT 0.6 02/23/2022    ALKPHOS 75 02/23/2022    AST 26 02/23/2022    ALT 8 02/23/2022     BMP:    Lab Results   Component Value Date     02/23/2022    K 5.8 02/23/2022    K 4.0 02/15/2022    CL 99 02/23/2022    CO2 20 02/23/2022    BUN 23 02/23/2022    LABALBU 4.1 02/23/2022    CREATININE 1.06 02/23/2022    CALCIUM 9.6 02/23/2022    GFRAA >60.0 02/23/2022    LABGLOM 49.8 02/23/2022    GLUCOSE 126 02/23/2022     Magnesium:    Lab Results   Component Value Date    MG 1.8 high with systolic blood pressure 069 mmHg or above or if diastolic blood pressure 85 mmHg or above  Please record heart rate with each blood pressure reading  -Will consider noninvasive coronary evaluation/stress testing in future given risk factors for CAD  -Status post MAIK on 11/24/2021 which revealed normal LV systolic function with EF of 60%, no evidence of ASD or PFO and no significant valvular heart disease  -Maintain routine follow-up with neurology as directed given history of TIA in November 2021  -Maintain routine follow-up with PCP  -Follow-up with cardiology in 1 week for reevaluation of blood pressure and symptoms      Counseling: The importance of daily weights, dietary sodium restriction, and contact with cardiology if weight is increased more than 3 lbs in any 48 hour period was stressed. The patient has been advised to contact us if theyexperience progressive SOB, orthopnea, PND or progressive edema.

## 2022-03-15 NOTE — PATIENT INSTRUCTIONS
-STOP Toprol XL (metoprolol succinate)  -START Coreg (carvedilol) 12.5mg PO twice daily beginning tomorrow 3/16/22  -START clonidine 0.1mg PO twice daily tonight 3/15/22  -Increase Lisinopril to 40mg PO daily      -Check blood pressure twice daily in a.m. and p.m. prior to taking medications and keep log of blood pressure trends to review at next office visit  Notify office if BP running low with  mmHg or below prior to taking medications  Notify office if BP running high with  mmHg or above or if DBP 90 mmHg or above  -Please record heart rate with each BP reading    -Recommend 2000 mg daily sodium restriction

## 2022-03-18 ENCOUNTER — TELEPHONE (OUTPATIENT)
Dept: CARDIOLOGY CLINIC | Age: 82
End: 2022-03-18

## 2022-03-18 DIAGNOSIS — I16.0 HYPERTENSIVE URGENCY: Primary | ICD-10-CM

## 2022-03-18 DIAGNOSIS — E87.5 HYPERKALEMIA: ICD-10-CM

## 2022-03-18 NOTE — TELEPHONE ENCOUNTER
Chrissy Lynn PA-C reviewed lab work and new orders received to stop aldactone and to hold lisinopril today and tomorrow. Repeat BMP on Monday 3-.     LMOM for son

## 2022-03-19 LAB
METANEPH/PLASMA INTERP: ABNORMAL
METANEPHRINE FREE PLASMA: 0.2 NMOL/L (ref 0–0.49)
NORMETANEPHRINE FREE PLASMA: 1.09 NMOL/L (ref 0–0.89)

## 2022-03-22 ENCOUNTER — HOSPITAL ENCOUNTER (OUTPATIENT)
Dept: LAB | Age: 82
Discharge: HOME | End: 2022-03-22
Payer: MEDICARE

## 2022-03-22 ENCOUNTER — OFFICE VISIT (OUTPATIENT)
Dept: CARDIOLOGY CLINIC | Age: 82
End: 2022-03-22
Payer: MEDICARE

## 2022-03-22 VITALS
HEART RATE: 65 BPM | DIASTOLIC BLOOD PRESSURE: 88 MMHG | SYSTOLIC BLOOD PRESSURE: 202 MMHG | BODY MASS INDEX: 18.88 KG/M2 | RESPIRATION RATE: 18 BRPM | WEIGHT: 110 LBS | OXYGEN SATURATION: 100 %

## 2022-03-22 DIAGNOSIS — N28.9 RENAL INSUFFICIENCY: ICD-10-CM

## 2022-03-22 DIAGNOSIS — Z86.73 HISTORY OF TIA (TRANSIENT ISCHEMIC ATTACK): ICD-10-CM

## 2022-03-22 DIAGNOSIS — I16.0 HYPERTENSIVE URGENCY: ICD-10-CM

## 2022-03-22 DIAGNOSIS — E87.5 HYPERKALEMIA: ICD-10-CM

## 2022-03-22 LAB
ANION GAP SERPL CALCULATED.3IONS-SCNC: 11 MEQ/L (ref 9–15)
BUN BLDV-MCNC: 18 MG/DL (ref 8–23)
CALCIUM SERPL-MCNC: 9.4 MG/DL (ref 8.5–9.9)
CHLORIDE BLD-SCNC: 105 MEQ/L (ref 95–107)
CO2: 21 MEQ/L (ref 20–31)
CREAT SERPL-MCNC: 1.02 MG/DL (ref 0.5–0.9)
GFR AFRICAN AMERICAN: >60
GFR NON-AFRICAN AMERICAN: 52.1
GLUCOSE BLD-MCNC: 134 MG/DL (ref 70–99)
POTASSIUM SERPL-SCNC: 4.6 MEQ/L (ref 3.4–4.9)
SODIUM BLD-SCNC: 137 MEQ/L (ref 135–144)

## 2022-03-22 PROCEDURE — 80048 BASIC METABOLIC PNL TOTAL CA: CPT

## 2022-03-22 PROCEDURE — 99214 OFFICE O/P EST MOD 30 MIN: CPT | Performed by: PHYSICIAN ASSISTANT

## 2022-03-22 PROCEDURE — 36415 COLL VENOUS BLD VENIPUNCTURE: CPT

## 2022-03-22 RX ORDER — CLONIDINE HYDROCHLORIDE 0.2 MG/1
0.2 TABLET ORAL 2 TIMES DAILY
Qty: 60 TABLET | Refills: 3 | Status: SHIPPED | OUTPATIENT
Start: 2022-03-22 | End: 2022-03-24 | Stop reason: SDUPTHER

## 2022-03-22 RX ORDER — CARVEDILOL 25 MG/1
25 TABLET ORAL 2 TIMES DAILY
Qty: 60 TABLET | Refills: 3 | Status: SHIPPED | OUTPATIENT
Start: 2022-03-22 | End: 2022-06-03 | Stop reason: SDUPTHER

## 2022-03-22 ASSESSMENT — ENCOUNTER SYMPTOMS
VOMITING: 0
CHEST TIGHTNESS: 0
NAUSEA: 0
SHORTNESS OF BREATH: 0
COLOR CHANGE: 0
ABDOMINAL DISTENTION: 0

## 2022-03-22 NOTE — PROGRESS NOTES
Patient: Anat Luna  YOB: 1940  MRN: [id number]    Chief Complaint:  Chief Complaint   Patient presents with    Hypertension     BP check         Subjective/HPI        3/22/22: Here for follow-up of hypertensive urgency. Was seen on 3/15/2022 at which time patient initially hypertensive with SBP greater than 200. Repeat manual blood pressure completed by myself at the completion of the office visit had shown slight improvement in SBP to around the 180s range. BP meds were adjusted including discontinuation of Toprol- mg daily and initiation of Coreg 12.5 mg p.o. twice daily as well as clonidine 0.1 mg p.o. twice daily and titration of lisinopril to 40 mg daily. Patient had blood work completed on 3/15/2022 which revealed hyperkalemia with a potassium elevated at 5.5. I had reviewed this blood work on 3/18/2022 and patient was called and advised to discontinue her Aldactone and hold her lisinopril for 2 days and repeat a BMP on Monday, 3/21/2022. Patient presents with her son again today. Reportedly were not able to go for blood work yesterday so we will completed later today. Patient states that her blood pressure has been running high over the weekend so she chose to take her lisinopril instead of holding it as previously recommended due to hyperkalemia. Patient has been very active over the past few days and reportedly did yard work for about 3 hours yesterday. She has no complaints and is completely asymptomatic despite elevated blood pressure. Denies shortness of breath or dyspnea exertion, chest pain, palpitations, diaphoresis, paroxysmal nocturnal dyspnea, orthopnea, lower extremity edema, dizziness, lightheadedness, or any other focal neurologic deficit. Blood pressure in office today elevated at 212/86 on the left and 202/88 on the right.   Patient brought with her her home BP machine and blood pressure was taken both arms on her machine as well with a reading of 200/95 on the right and 193/123 on the left. Patient reports that blood pressures have been elevated on and off at home but is unable to tell me exactly the ranges as she did not bring with her her BP log. Discussed with patient and son regarding further titration of BP medications including further increasing Coreg to 25 mg p.o. twice daily and increasing clonidine to 0.2 mg p.o. twice daily. She is to continue lisinopril at 40 mg p.o. daily for now. Patient and son again educated on importance of presenting immediately to ER if any neurologic symptoms develop and verbalized understanding. I manually repeated patient's blood pressure at the conclusion of her office visit and BP was 160/76 on the left and 158/76 on the right. Either BP is extremely labile or there is a component of whitecoat syndrome as initial BP in office visits are always extremely high. Recent labs reviewed and documented below. CMP on 3/15/2022: Sodium 137, potassium elevated 5.5, chloride 101, total CO2 of 19, BUN 24, creatinine 0.96, GFR 55.9, glucose 137--following my review of this elevated potassium level on 3/18/2022, patient was called and instructed to stop Aldactone and hold lisinopril x2 days and repeat BMP on Monday, 3/21/2022  TSH 3/15/2022: Elevated at 4.50  Free T4 3/15/2022: 1.39  Free normetanephrine on 3/15/2022: Elevated at 1.09 compared to 0.51 on 2/15/2022  Free metanephrine on 3/15/2022: 0.20 compared to 0.14 on 2/15/2022      BP elevated at 212/86 on the left and 202/88 on the right patient completely asymptomatic. Heart rate 65 bpm, pulse ox 100% on room air. Weight is 110 pounds. 3/15/22: This is a very pleasant 26-year-old  female with past medical history significant for hypertension, dyslipidemia, history of TIA in November 2021 and hypothyroidism who presents for initial cardiac evaluation following referral by PCP due to uncontrolled hypertension.   Patient is currently on Toprol- mg p.o. daily, Aldactone 25 mg p.o. daily and lisinopril 20 mg p.o. daily. She states that she previously took lisinopril/hydrochlorothiazide but had an allergic reaction due to hydrochlorothiazide. She subsequently went back to her previous dose of lisinopril 20. She initially saw Dr. Army Alaniz on 2/14/2022 to establish with primary care physician. She was noted at that time to have significantly elevated blood pressure at 240/100 and was advised to present to ER following that office appointment. Blood work was also ordered including TSH, plasma metanephrines and CMP. During ER visit on 2/14/2022, patient was treated with labetalol and Ativan after which her blood pressure began improving. She was admitted to [facility] for observation and further BP management. Her lisinopril was changed to lisinopril hydrochlorothiazide 20/12.5 mg twice daily during this hospital admission and she was also added on Aldactone 25 mg daily. She was subsequently discharged home on 2/15/2022 in stable condition. She later had hospital follow-up appointment with primary care physician on 2/23/2022. At this office visit, patient again noted to have severely elevated blood pressure at 200/80 on initial evaluation and repeat BP of 210/85. Patient again advised ER at that time. During her ER evaluation on 2/23/2022 it does not appear any medication adjustments were made and blood pressure later improved and she was discharged home with recommendation for outpatient follow-up with PCP. She was later referred to cardiology for further evaluation. At time of evaluation today, patient presents with her son. She is extremely hard of hearing so history obtained from both patient and son. Patient's blood pressure is extremely elevated in office today at 216/102 on the left and 220/90 on the right. Patient is completely asymptomatic.   She has no neurologic complaints including headache, dizziness, lightheadedness, blurred vision, facial droop, slurred speech or any unilateral weakness. She denies chest pain, shortness of breath, palpitations, lower extremity edema, syncope, fever or chills. Given significantly elevated blood pressure, I recommended presenting to ER for further evaluation and management however patient and son declining at this time. Patient states that she has gone to ER before and nothing has been done for her. Patient and son are agreeable to make medication adjustments including discontinuing Toprol and starting carvedilol 12.5 mg p.o. twice daily, increasing lisinopril to 40 mg p.o. daily and adding clonidine 0.1 mg p.o. twice daily. I have advised patient and son that if patient develops any neurologic symptoms including headache, dizziness, slurred speech, blurred vision, facial droop or weakness she is to present immediately to the ER. Patient and son agreeable to plan. Patient denies any prior cardiac history including myocardial infarction, congestive heart failure or arrhythmia. She has had no prior stress test or cardiac catheterization. I manually repeated blood pressure at the end of our visit today and blood pressure in left arm had improved to 180/88. Most recent diagnostic testing reviewed and documented below. EKG 2/23/22: SB 59, baseline artifact, no acute ischemic changes, QTc 388ms      CT Brain 2/14/22:  Impression   1. Cortical atrophy and periventricular microangiopathy.       2. No acute hemorrhage is seen. All CT scans at this facility use dose modulation, iterative reconstruction, and/or weight based dosing when appropriate to reduce radiation dose to as low as reasonably achievable.       PORTABLE CHEST       COMPARISON: June 12, 2021       HISTORY: Headache, hypertension       TECHNIQUE: AP view           FINDINGS:   No consolidating pneumonia, pleural effusion or pneumothorax is seen. In the right upper lobe there is scarring.  The cardiac silhouette is within normal limits of size. The bony structures are grossly intact.       IMPRESSION: No evidence of acute cardiopulmonary process         CTA Neck 11/23/21:  IMPRESSION: There is increased tortuosity of the visualized vessels and atherosclerosis.       There are no hemodynamically significant stenoses of the right internal carotid artery.       There is 60% stenosis of proximal left internal carotid artery. CTA Head on 11/23/21:  Impression   The major intracranial arterial structures are patent without high-grade stenosis, large vessel cut off, or aneurysm. MAIK 11/24/21:  Conclusions   Summary   Left ventricular ejection fraction is visually estimated at 60%. No left ventricular thrombus was seen. Normal LV size   Left atrial appendage well visualized. Normal size with reduced Doppler   velocities. No evidence of thrombus or spontaneous contrast noted within   the JUANITO. No evidence of mass or thrombus within left atrium. No PFO or ASD negative bubble study   Aortic valve leaflets are mildly thickened. Aortic root is somewhat fibrocalcified. Signature   ----------------------------------------------------------------   Electronically signed by Glory Torres(Interpreting physician)   on 11/25/2021 08:03 PM   ----------------------------------------------------------------      Most recent labs reviewed and documented below  CMP 2/23/2022: Sodium low 132, potassium elevated at 5.5 but specimen hemolyzed, chloride 99, total CO2 20, BUN 23, creatinine 1.06, GFR 49.8, glucose 126  BMP 2/15/2022: Sodium 141, potassium 4.0, chloride 104, total CO2 26, BUN 13, creatinine 0.78, GFR greater than 60, glucose 122  CBC 2/15/2022: WBC 4.4, hemoglobin 11.8, hematocrit 33.1, platelets 738  Magnesium 2/15/2022: 1.8  Free metanephrine on 2/15/2022: Within normal range at 0.14  Free normetanephrine on 2/15/2022:  Within normal range at 0.51      Blood pressure significantly elevated in office today at 216/102 on the left and 220/90 on the right. Heart rate 72 bpm, pulse ox of 97% on room air. Weight is 110 pounds. **I manually repeated blood pressure and left arm prior to patient leaving office today and BP had improved to 180/88. PMHx:  HTN  Hx TIA in 11/2021  Dyslipidemia  Hypothyroidism  Borderline DM  No known CAD    PSHx:  Ear surgery  Hysterectomy  Tonsillectomy    Social Hx:  No tobacco  No alcohol  No drugs      Family Hx:  No known CAD    Allergies   Allergen Reactions    Albuterol     Amlodipine     Hydralazine Itching    Metformin And Related     Zoster Vac Recomb Adjuvanted Itching     Shingles Vaccine        Current Outpatient Medications   Medication Sig Dispense Refill    carvedilol (COREG) 25 MG tablet Take 1 tablet by mouth 2 times daily 60 tablet 3    cloNIDine (CATAPRES) 0.2 MG tablet Take 1 tablet by mouth 2 times daily 60 tablet 3    lisinopril (PRINIVIL;ZESTRIL) 40 MG tablet Take 1 tablet by mouth daily 30 tablet 3    levothyroxine (SYNTHROID) 75 MCG tablet Take 1 tablet by mouth Daily 30 tablet 1    atorvastatin (LIPITOR) 20 MG tablet Take 1 tablet by mouth daily 30 tablet 1    aspirin 81 MG chewable tablet Take 1 tablet by mouth daily 30 tablet 3    Artificial Tear Solution (TEARS AGAIN ADVANCED EYELID) SOLN Apply to eye      FLOVENT HFA 44 MCG/ACT inhaler INHALE 1 PUFF BY MOUTH TWICE DAILY FOR 90 DAYS. No current facility-administered medications for this visit.        Past Medical History:   Diagnosis Date    Allergic rhinitis     Asthma     uses flovent    Hearing loss     Hyperlipidemia     Hypertension     Hypothyroidism     Lichen planus     Dr. Ruby Dennis       Past Surgical History:   Procedure Laterality Date    HYSTERECTOMY, TOTAL ABDOMINAL      TONSILLECTOMY         Social History     Socioeconomic History    Marital status: Single     Spouse name: Not on file    Number of children: 2    Years of education: Not on file    Highest education level: Not on file   Occupational History    Not on file   Tobacco Use    Smoking status: Never Smoker    Smokeless tobacco: Never Used   Substance and Sexual Activity    Alcohol use: No    Drug use: No    Sexual activity: Not Currently   Other Topics Concern    Not on file   Social History Narrative    Has 2 sons. One of her sons lives with her in a mobile home. Social Determinants of Health     Financial Resource Strain: Low Risk     Difficulty of Paying Living Expenses: Not hard at all   Food Insecurity: No Food Insecurity    Worried About Running Out of Food in the Last Year: Never true    Genevieve of Food in the Last Year: Never true   Transportation Needs:     Lack of Transportation (Medical): Not on file    Lack of Transportation (Non-Medical): Not on file   Physical Activity:     Days of Exercise per Week: Not on file    Minutes of Exercise per Session: Not on file   Stress:     Feeling of Stress : Not on file   Social Connections:     Frequency of Communication with Friends and Family: Not on file    Frequency of Social Gatherings with Friends and Family: Not on file    Attends Congregation Services: Not on file    Active Member of [address] [place] [address] or Organizations: Not on file    Attends Club or Organization Meetings: Not on file    Marital Status: Not on file   Intimate Partner Violence:     Fear of Current or Ex-Partner: Not on file    Emotionally Abused: Not on file    Physically Abused: Not on file    Sexually Abused: Not on file   Housing Stability:     Unable to Pay for Housing in the Last Year: Not on file    Number of [place] in the Last Year: Not on file    Unstable Housing in the Last Year: Not on file       Family History   Problem Relation Age of Onset    High Blood Pressure Mother          Review of Systems:   Review of Systems   Constitutional: Negative for activity change, appetite change, fatigue and fever. HENT: Negative for congestion.     Respiratory: Negative for chest tightness and shortness of breath. Cardiovascular: Negative for chest pain, palpitations and leg swelling. Occasional orthopnea; No PND   Gastrointestinal: Negative for abdominal distention, nausea and vomiting. Genitourinary: Negative for difficulty urinating. Musculoskeletal: Negative for arthralgias. Skin: Negative for color change. Neurological: Negative for dizziness, syncope, light-headedness and headaches. Psychiatric/Behavioral: Negative for agitation. Physical Examination:    BP (!) 202/88 (Site: Right Upper Arm, Position: Sitting, Cuff Size: Small Adult)   Pulse 65   Resp 18   Wt 110 lb (49.9 kg)   SpO2 100%   BMI 18.88 kg/m²    Physical Exam  Constitutional:       General: She is not in acute distress. Appearance: Normal appearance. Comments: Extremely [place]   HENT:      Head: Normocephalic and atraumatic. Cardiovascular:      Rate and Rhythm: Normal rate and regular rhythm. Heart sounds: No murmur heard. Pulmonary:      Effort: Pulmonary effort is normal. No respiratory distress. Breath sounds: No wheezing, rhonchi or rales. Abdominal:      Palpations: Abdomen is soft. Tenderness: There is no abdominal tenderness. Musculoskeletal:         General: Normal range of motion. Right lower leg: No edema. Left lower leg: No edema. Skin:     General: Skin is warm and dry. Neurological:      General: No focal deficit present. Mental Status: She is alert and oriented to person, place, and time. Cranial Nerves: No cranial nerve deficit.    Psychiatric:         Mood and Affect: Mood normal.         Behavior: Behavior normal.         LABS:  CBC:   Lab Results   Component Value Date    WBC 4.8 02/23/2022    RBC 3.89 02/23/2022    HGB 12.5 02/23/2022    HCT 36.0 02/23/2022    MCV 92.5 02/23/2022    MCH 32.1 02/23/2022    MCHC 34.7 02/23/2022    RDW 12.3 02/23/2022     02/23/2022    MPV 9.7 09/07/2014 Lipids:  Lab Results   Component Value Date    CHOL 110 11/24/2021    CHOL 171 09/07/2014     Lab Results   Component Value Date    TRIG 95 11/24/2021    TRIG 167 09/07/2014     Lab Results   Component Value Date    HDL 37 (L) 11/24/2021    HDL 69 (H) 09/07/2014     Lab Results   Component Value Date    LDLCALC 54 11/24/2021    LDLCALC 69 09/07/2014     No results found for: LABVLDL, VLDL  No results found for: CHOLHDLRATIO  CMP:    Lab Results   Component Value Date     03/22/2022    K 4.6 03/22/2022    K 4.0 02/15/2022     03/22/2022    CO2 21 03/22/2022    BUN 18 03/22/2022    CREATININE 1.02 03/22/2022    GFRAA >60.0 03/22/2022    LABGLOM 52.1 03/22/2022    GLUCOSE 134 03/22/2022    PROT 9.0 03/15/2022    LABALBU 4.5 03/15/2022    CALCIUM 9.4 03/22/2022    BILITOT 0.5 03/15/2022    ALKPHOS 80 03/15/2022    AST 17 03/15/2022    ALT 8 03/15/2022     BMP:    Lab Results   Component Value Date     03/22/2022    K 4.6 03/22/2022    K 4.0 02/15/2022     03/22/2022    CO2 21 03/22/2022    BUN 18 03/22/2022    LABALBU 4.5 03/15/2022    CREATININE 1.02 03/22/2022    CALCIUM 9.4 03/22/2022    GFRAA >60.0 03/22/2022    LABGLOM 52.1 03/22/2022    GLUCOSE 134 03/22/2022     Magnesium:    Lab Results   Component Value Date    MG 1.8 02/23/2022     TSH:  Lab Results   Component Value Date    TSH 4.500 (H) 03/15/2022     . result  No results for input(s): PROBNP in the last 72 hours. No results for input(s): INR in the last 72 hours. Patient Active Problem List   Diagnosis    Hypothyroidism    Allergic rhinitis    Hypertension    Hyperlipidemia    Lichen planus    Hearing loss    Asthma    TIN (acute kidney injury) ([address]    Syncope    E. coli UTI    AMS (altered mental status)    C[facility]brovascular accident (CVA) [facility]    Hypertensive urgency       Assessment/Plan:     Diagnosis Orders   1.  Hypertensive urgency      Titrate Coreg to 25 mg p.o. twice daily and clonidine 0.2 mg p.o. twice daily. Routine home BP monitoring advised and patient to keep BP log. 2. Hyperkalemia  Basic Metabolic Panel    Repeat BMP today to reevaluate hyperkalemia. Previously potassium was 5.5 on 3/15/2022. Aldactone has been discontinued. 3. Renal insufficiency      Mild renal insufficiency per BMP on 3/15/2022. Repeat BMP today to reevaluate. 4. History of TIA (transient ischemic attack)      Continue current meds       -Maximize medical therapy-aspirin 81 mg p.o. daily, increase Coreg to 25 mg p.o. twice daily, increase clonidine to 0.2 mg p.o. twice daily, lisinopril 40 mg p.o. daily, Lipitor 20 mg p.o. daily  -Aldactone recently discontinued due to hyperkalemia  -Cardiac/less than 2 g sodium diet recommended  -Repeat BMP today to reevaluate potassium. Patient hyperkalemic with potassium of 5.5 on 3/15/2022 and Aldactone discontinued  -Recommend routine blood pressure monitoring at home. Advised patient check blood pressure twice daily in a.m. and p.m. prior to taking medications and record log of blood pressure trends to review at next office visit  Notify office if BP running high with systolic blood pressure 488 mmHg or above or if diastolic blood pressure 85 mmHg or above  Please record heart rate with each blood pressure reading  -Will consider noninvasive coronary evaluation/stress testing in future given risk factors for CAD. No anginal complaints at this time  -Status post MAIK on 11/24/2021 which revealed normal LV systolic function with EF of 60%, no evidence of ASD or PFO and no significant valvular heart disease  **Patient is asymptomatic at this time.   BP meds adjusted and patient and son agreeable to present immediately to ER if neurologic symptoms develop  -Maintain routine follow-up with neurology as directed given history of TIA in November 2021  -Maintain routine follow-up with PCP  -Follow-up with cardiology in 1 week for BP check    Patient's hypertension is somewhat difficult to manage as she is consistently extremely hypertensive on initial vitals in office both on visit today 3/22/2022 on prior visit with me on 3/15/2022 as well as prior visits with PCP.   On repeat blood pressure at the conclusion of office visit, patient's SBP does improve but she is still at least mildly hypertensive  Further titration of BP medications made at this time but if episodes of hypotension noted on home monitoring, will consider decreasing at least one of the present antihypertensive medications

## 2022-03-22 NOTE — PATIENT INSTRUCTIONS
-Increase Coreg (carvedilol) to 25mg PO twice daily---may take 2--12.5mg tablets twice daily until current prescription runs out. New prescription sent ot pharmacy for higher dose  -Increase clonidine to 0.2mg PO twice daily--may take 2--0.1mg tablets twice daily until current prescription runs out.  New prescription sent to pharmacy for high dose      Check repeat labs (BMP) today      -Check blood pressure twice daily in a.m. and p.m. prior to taking medications and keep log of blood pressure trends to review at next office visit  Notify office if BP running low with  mmHg or below prior to taking medications  Notify office if BP running high with  mmHg or above or if DBP 90 mmHg or above    -Recommend 2000 mg daily sodium restriction

## 2022-03-23 ENCOUNTER — TELEPHONE (OUTPATIENT)
Dept: CARDIOLOGY CLINIC | Age: 82
End: 2022-03-23

## 2022-03-23 DIAGNOSIS — I16.0 HYPERTENSIVE URGENCY: Primary | ICD-10-CM

## 2022-03-23 DIAGNOSIS — I10 RESISTANT HYPERTENSION: ICD-10-CM

## 2022-03-23 DIAGNOSIS — R79.89 ELEVATED PLASMA METANEPHRINES: ICD-10-CM

## 2022-03-23 NOTE — TELEPHONE ENCOUNTER
Spoke with patient's son and confirmed the change of medications and advised that the labs were stable.

## 2022-03-24 ENCOUNTER — TELEPHONE (OUTPATIENT)
Dept: CARDIOLOGY CLINIC | Age: 82
End: 2022-03-24

## 2022-03-24 RX ORDER — CLONIDINE HYDROCHLORIDE 0.1 MG/1
0.1 TABLET ORAL 2 TIMES DAILY
Qty: 60 TABLET | Refills: 3 | Status: SHIPPED | OUTPATIENT
Start: 2022-03-24 | End: 2022-03-29

## 2022-03-24 NOTE — TELEPHONE ENCOUNTER
Called and spoke with son to get update on BP and HR    9am this morning left-122/79  HR 58                               Right-126/76 HR 58    Last night Left 136/77 HR 63                    Right 126/67 HR 60    Per C. Feli Lopes PA-C have patient decrease clonidine to 0.1mg BID and to call tomorrow and update BP and HR.     Patient son Lizeth [place] aware

## 2022-03-24 NOTE — RESULT ENCOUNTER NOTE
Please notify patient and son that recent plasma normetanephrine level returned very minimally elevated. This is a borderline result and difficult to use to make a diagnosis, however, with her significantly elevated BP readings resistant to multiple medications, the next step would be CT imaging to evaluate for an adrenal mass.   (An adrenal gland mass that is secreting hormones will elevate blood pressure.) I have left an order for imaging in her chart, please help arrange imaging

## 2022-03-25 ENCOUNTER — TELEPHONE (OUTPATIENT)
Dept: CARDIOLOGY CLINIC | Age: 82
End: 2022-03-25

## 2022-03-25 NOTE — TELEPHONE ENCOUNTER
Son calling with update on BP and HR.     Last night     Left 120/80 HR 68  Right 145/94 HR 60    This morning Left 138/64 HR 56                        Right 138/64 HR 56    Patient feels fine and is doing well

## 2022-03-29 ENCOUNTER — OFFICE VISIT (OUTPATIENT)
Dept: CARDIOLOGY CLINIC | Age: 82
End: 2022-03-29
Payer: MEDICARE

## 2022-03-29 VITALS
HEART RATE: 62 BPM | RESPIRATION RATE: 16 BRPM | BODY MASS INDEX: 18.78 KG/M2 | WEIGHT: 110 LBS | DIASTOLIC BLOOD PRESSURE: 92 MMHG | OXYGEN SATURATION: 97 % | HEIGHT: 64 IN | SYSTOLIC BLOOD PRESSURE: 216 MMHG

## 2022-03-29 DIAGNOSIS — I16.0 HYPERTENSIVE URGENCY: ICD-10-CM

## 2022-03-29 DIAGNOSIS — E87.5 HYPERKALEMIA: ICD-10-CM

## 2022-03-29 DIAGNOSIS — N28.9 RENAL INSUFFICIENCY: ICD-10-CM

## 2022-03-29 PROCEDURE — 99214 OFFICE O/P EST MOD 30 MIN: CPT | Performed by: PHYSICIAN ASSISTANT

## 2022-03-29 RX ORDER — CLONIDINE HYDROCHLORIDE 0.2 MG/1
0.2 TABLET ORAL 2 TIMES DAILY
Qty: 60 TABLET | Refills: 3 | Status: SHIPPED | OUTPATIENT
Start: 2022-03-29 | End: 2022-04-05 | Stop reason: SDUPTHER

## 2022-03-29 ASSESSMENT — ENCOUNTER SYMPTOMS
ABDOMINAL DISTENTION: 0
CHEST TIGHTNESS: 0
VOMITING: 0
NAUSEA: 0
COLOR CHANGE: 0
SHORTNESS OF BREATH: 0

## 2022-03-29 NOTE — PROGRESS NOTES
Patient: Avis Fontana  YOB: 1940  MRN: [id number]    Chief Complaint:  Chief Complaint   Patient presents with    Follow-up     1 week BP check    Hypertension         Subjective/HPI        3/29/22: Here for follow-up of uncontrolled hypertension. Following last office visit, patient's carvedilol was increased to 25 mg p.o. twice daily and clonidine increased to 0.2 mg p.o. twice daily for improved BP management. Few days after her visit, son called to notify us that her blood pressures were around 135 systolically. Due to concern for overmedication and possible hypotension, I decreased her clonidine back to 0.1 mg p.o. twice daily. She has been compliant with all of her medications. She denies any shortness of breath, chest pain, palpitations, dizziness, lightheadedness, syncope, fever or chills. She does complain of a slight headache today and does get occasional headaches. BP again significantly elevated in office today at 220/102 on the left and 216/92 on the right. I reviewed log of patient's blood pressure trends from home over the past week. She has been checking blood pressure twice daily in both arms and systolic blood pressure has been between 501-109D systolically and 86D to 33D diastolically. Heart rate typically in the 60s range. Patient states that her blood pressure is always high when she goes out or to the office visits because she becomes anxious as she is extremely hard of hearing and she feels like people treat her differently. I have repeated blood pressures at time of my evaluation with patient and BP remains significantly elevated at 240/90. Patient has been advised ER but is declining as she feels okay at this time and has been to the ER before without any significant adjustments made.     Unfortunately, we are limited as far as what antihypertensive medications we can use because patient has listed allergy to hydralazine and amlodipine and previously stated that she experienced a reaction to hydrochlorothiazide. Patient uncertain exactly what her reaction was to hydrochlorothiazide and I somewhat doubt a true allergy. Also son states that she would not be able to get any new medications until next week when she gets paid. Recent labs reviewed and documented below. BMP 3/22/2022: Sodium 137, potassium 4.6, chloride 105, total CO2 21, BUN 18, creatinine 1.02, GFR low at 52.1, glucose 134   Plasma metanephrines ordered by PCP on 3/15/2022: Free normetanephrine elevated at 1.09 with free metanephrine 0.20.--- Primary care physician ordered CT of the abdomen to further evaluate for possible adrenal mass given borderline elevated result. Blood pressure elevated in office today 220/102 on the left and 216/92 on the right, heart rate 62 bpm, pulse ox 97% on room air.      3/22/22: Here for follow-up of hypertensive urgency. Was seen on 3/15/2022 at which time patient initially hypertensive with SBP greater than 200. Repeat manual blood pressure completed by myself at the completion of the office visit had shown slight improvement in SBP to around the 180s range. BP meds were adjusted including discontinuation of Toprol- mg daily and initiation of Coreg 12.5 mg p.o. twice daily as well as clonidine 0.1 mg p.o. twice daily and titration of lisinopril to 40 mg daily. Patient had blood work completed on 3/15/2022 which revealed hyperkalemia with a potassium elevated at 5.5. I had reviewed this blood work on 3/18/2022 and patient was called and advised to discontinue her Aldactone and hold her lisinopril for 2 days and repeat a BMP on Monday, 3/21/2022. Patient presents with her son again today. Reportedly were not able to go for blood work yesterday so we will completed later today.   Patient states that her blood pressure has been running high over the weekend so she chose to take her lisinopril instead of holding it as previously recommended due to hyperkalemia. Patient has been very active over the past few days and reportedly did yard work for about 3 hours yesterday. She has no complaints and is completely asymptomatic despite elevated blood pressure. Denies shortness of breath or dyspnea exertion, chest pain, palpitations, diaphoresis, paroxysmal nocturnal dyspnea, orthopnea, lower extremity edema, dizziness, lightheadedness, or any other focal neurologic deficit. Blood pressure in office today elevated at 212/86 on the left and 202/88 on the right. Patient brought with her her home BP machine and blood pressure was taken both arms on her machine as well with a reading of 200/95 on the right and 193/123 on the left. Patient reports that blood pressures have been elevated on and off at home but is unable to tell me exactly the ranges as she did not bring with her her BP log. Discussed with patient and son regarding further titration of BP medications including further increasing Coreg to 25 mg p.o. twice daily and increasing clonidine to 0.2 mg p.o. twice daily. She is to continue lisinopril at 40 mg p.o. daily for now. Patient and son again educated on importance of presenting immediately to ER if any neurologic symptoms develop and verbalized understanding. I manually repeated patient's blood pressure at the conclusion of her office visit and BP was 160/76 on the left and 158/76 on the right. Either BP is extremely labile or there is a component of whitecoat syndrome as initial BP in office visits are always extremely high. Recent labs reviewed and documented below.   CMP on 3/15/2022: Sodium 137, potassium elevated 5.5, chloride 101, total CO2 of 19, BUN 24, creatinine 0.96, GFR 55.9, glucose 137--following my review of this elevated potassium level on 3/18/2022, patient was called and instructed to stop Aldactone and hold lisinopril x2 days and repeat BMP on Monday, 3/21/2022  TSH 3/15/2022: Elevated at 4.50  Free T4 3/15/2022: time.  During her ER evaluation on 2/23/2022 it does not appear any medication adjustments were made and blood pressure later improved and she was discharged home with recommendation for outpatient follow-up with PCP. She was later referred to cardiology for further evaluation. At time of evaluation today, patient presents with her son. She is extremely hard of hearing so history obtained from both patient and son. Patient's blood pressure is extremely elevated in office today at 216/102 on the left and 220/90 on the right. Patient is completely asymptomatic. She has no neurologic complaints including headache, dizziness, lightheadedness, blurred vision, facial droop, slurred speech or any unilateral weakness. She denies chest pain, shortness of breath, palpitations, lower extremity edema, syncope, fever or chills. Given significantly elevated blood pressure, I recommended presenting to ER for further evaluation and management however patient and son declining at this time. Patient states that she has gone to ER before and nothing has been done for her. Patient and son are agreeable to make medication adjustments including discontinuing Toprol and starting carvedilol 12.5 mg p.o. twice daily, increasing lisinopril to 40 mg p.o. daily and adding clonidine 0.1 mg p.o. twice daily. I have advised patient and son that if patient develops any neurologic symptoms including headache, dizziness, slurred speech, blurred vision, facial droop or weakness she is to present immediately to the ER. Patient and son agreeable to plan. Patient denies any prior cardiac history including myocardial infarction, congestive heart failure or arrhythmia. She has had no prior stress test or cardiac catheterization. I manually repeated blood pressure at the end of our visit today and blood pressure in left arm had improved to 180/88. Most recent diagnostic testing reviewed and documented below.     EKG 2/23/22: SB 59, baseline artifact, no acute ischemic changes, QTc 388ms      CT Brain 2/14/22:  Impression   1. Cortical atrophy and periventricular microangiopathy.       2. No acute hemorrhage is seen. All CT scans at this facility use dose modulation, iterative reconstruction, and/or weight based dosing when appropriate to reduce radiation dose to as low as reasonably achievable.       PORTABLE CHEST       COMPARISON: June 12, 2021       HISTORY: Headache, hypertension       TECHNIQUE: AP view           FINDINGS:   No consolidating pneumonia, pleural effusion or pneumothorax is seen. In the right upper lobe there is scarring. The cardiac silhouette is within normal limits of size. The bony structures are grossly intact.       IMPRESSION: No evidence of acute cardiopulmonary process         CTA Neck 11/23/21:  IMPRESSION: There is increased tortuosity of the visualized vessels and atherosclerosis.       There are no hemodynamically significant stenoses of the right internal carotid artery.       There is 60% stenosis of proximal left internal carotid artery. CTA Head on 11/23/21:  Impression   The major intracranial arterial structures are patent without high-grade stenosis, large vessel cut off, or aneurysm. MAIK 11/24/21:  Conclusions   Summary   Left ventricular ejection fraction is visually estimated at 60%. No left ventricular thrombus was seen. Normal LV size   Left atrial appendage well visualized. Normal size with reduced Doppler   velocities. No evidence of thrombus or spontaneous contrast noted within   the JUANITO. No evidence of mass or thrombus within left atrium. No PFO or ASD negative bubble study   Aortic valve leaflets are mildly thickened. Aortic root is somewhat fibrocalcified.       Signature   ----------------------------------------------------------------   Electronically signed by Bernice  Brian(Interpreting physician)   on 11/25/2021 08:03 PM ----------------------------------------------------------------      Most recent labs reviewed and documented below  CMP 2/23/2022: Sodium low 132, potassium elevated at 5.5 but specimen hemolyzed, chloride 99, total CO2 20, BUN 23, creatinine 1.06, GFR 49.8, glucose 126  BMP 2/15/2022: Sodium 141, potassium 4.0, chloride 104, total CO2 26, BUN 13, creatinine 0.78, GFR greater than 60, glucose 122  CBC 2/15/2022: WBC 4.4, hemoglobin 11.8, hematocrit 33.1, platelets 835  Magnesium 2/15/2022: 1.8  Free metanephrine on 2/15/2022: Within normal range at 0.14  Free normetanephrine on 2/15/2022: Within normal range at 0.51      Blood pressure significantly elevated in office today at 216/102 on the left and 220/90 on the right. Heart rate 72 bpm, pulse ox of 97% on room air. Weight is 110 pounds. **I manually repeated blood pressure and left arm prior to patient leaving office today and BP had improved to 180/88.     PMHx:  HTN  Hx TIA in 11/2021  Dyslipidemia  Hypothyroidism  Borderline DM  No known CAD    PSHx:  Ear surgery  Hysterectomy  Tonsillectomy    Social Hx:  No tobacco  No alcohol  No drugs      Family Hx:  No known CAD    Allergies   Allergen Reactions    Albuterol     Amlodipine     Hydralazine Itching    Metformin And Related     Zoster Vac Recomb Adjuvanted Itching     Shingles Vaccine        Current Outpatient Medications   Medication Sig Dispense Refill    cloNIDine (CATAPRES) 0.2 MG tablet Take 1 tablet by mouth 2 times daily 60 tablet 3    carvedilol (COREG) 25 MG tablet Take 1 tablet by mouth 2 times daily 60 tablet 3    lisinopril (PRINIVIL;ZESTRIL) 40 MG tablet Take 1 tablet by mouth daily 30 tablet 3    levothyroxine (SYNTHROID) 75 MCG tablet Take 1 tablet by mouth Daily 30 tablet 1    atorvastatin (LIPITOR) 20 MG tablet Take 1 tablet by mouth daily 30 tablet 1    aspirin 81 MG chewable tablet Take 1 tablet by mouth daily 30 tablet 3    Artificial Tear Solution (TEARS AGAIN ADVANCED EYELID) SOLN Apply to eye      FLOVENT HFA 44 MCG/ACT inhaler INHALE 1 PUFF BY MOUTH TWICE DAILY FOR 90 DAYS. No current facility-administered medications for this visit. Past Medical History:   Diagnosis Date    Allergic rhinitis     Asthma     uses flovent    Hearing loss     Hyperlipidemia     Hypertension     Hypothyroidism     Lichen planus     Dr. Artur Gonzalez       Past Surgical History:   Procedure Laterality Date    HYSTERECTOMY, TOTAL ABDOMINAL      TONSILLECTOMY         Social History     Socioeconomic History    Marital status: Single     Spouse name: None    Number of children: 2    Years of education: None    Highest education level: None   Occupational History    None   Tobacco Use    Smoking status: Never Smoker    Smokeless tobacco: Never Used   Substance and Sexual Activity    Alcohol use: No    Drug use: No    Sexual activity: Not Currently   Other Topics Concern    None   Social History Narrative    Has 2 sons. One of her sons lives with her in a mobile home. Social Determinants of Health     Financial Resource Strain: Low Risk     Difficulty of Paying Living Expenses: Not hard at all   Food Insecurity: No Food Insecurity    Worried About Running Out of Food in the Last Year: Never true    [place] of Food in the Last Year: Never true   Transportation Needs:     Lack of Transportation (Medical): Not on file    Lack of Transportation (Non-Medical):  Not on file   Physical Activity:     Days of Exercise per Week: Not on file    Minutes of Exercise per Session: Not on file   Stress:     Feeling of Stress : Not on file   Social Connections:     Frequency of Communication with Friends and Family: Not on file    Frequency of Social Gatherings with Friends and Family: Not on file    Attends Mu-ism Services: Not on file    Active Member of Clubs or Organizations: Not on file    Attends Club or Organization Meetings: Not on file    Marital Status: Not on file   Intimate Partner Violence:     Fear of Current or Ex-Partner: Not on file    Emotionally Abused: Not on file    Physically Abused: Not on file    Sexually Abused: Not on file   Housing Stability:     Unable to Pay for Housing in the Last Year: Not on file    Number of [place] in the Last Year: Not on file    Unstable Housing in the Last Year: Not on file       Family History   Problem Relation Age of Onset    High Blood Pressure Mother          Review of Systems:   Review of Systems   Constitutional: Negative for activity change, appetite change, fatigue and fever. HENT: Negative for congestion. Respiratory: Negative for chest tightness and shortness of breath. Cardiovascular: Negative for chest pain, palpitations and leg swelling. Occasional orthopnea; No PND   Gastrointestinal: Negative for abdominal distention, nausea and vomiting. Occasional heartburn   Genitourinary: Negative for difficulty urinating. Musculoskeletal: Negative for arthralgias. Skin: Negative for color change. Neurological: Positive for headaches (occasional). Negative for dizziness, syncope and light-headedness. Psychiatric/Behavioral: Negative for agitation. Physical Examination:    BP (!) 216/92 (Site: Right Upper Arm, Position: Sitting, Cuff Size: Medium Adult)   Pulse 62   Resp 16   Ht 5' 4\" (1.626 m)   Wt 110 lb (49.9 kg)   SpO2 97%   BMI 18.88 kg/m²    Physical Exam  Constitutional:       General: She is not in acute distress. Appearance: Normal appearance. Comments: Extremely [place]   HENT:      Head: Normocephalic and atraumatic. Cardiovascular:      Rate and Rhythm: Normal rate and regular rhythm. Heart sounds: No murmur heard. Pulmonary:      Effort: Pulmonary effort is normal. No respiratory distress. Breath sounds: No wheezing, rhonchi or rales. Abdominal:      Palpations: Abdomen is soft. Tenderness: There is no abdominal tenderness. Musculoskeletal:         General: Normal range of motion. Right lower leg: No edema. Left lower leg: No edema. Skin:     General: Skin is warm and dry. Neurological:      General: No focal deficit present. Mental Status: She is alert and oriented to person, place, and time. Cranial Nerves: No cranial nerve deficit.    Psychiatric:         Mood and Affect: Mood normal.         Behavior: Behavior normal.         LABS:  CBC:   Lab Results   Component Value Date    WBC 4.8 02/23/2022    RBC 3.89 02/23/2022    HGB 12.5 02/23/2022    HCT 36.0 02/23/2022    MCV 92.5 02/23/2022    MCH 32.1 02/23/2022    MCHC 34.7 02/23/2022    RDW 12.3 02/23/2022     02/23/2022    MPV 9.7 09/07/2014     Lipids:  Lab Results   Component Value Date    CHOL 110 11/24/2021    CHOL 171 09/07/2014     Lab Results   Component Value Date    TRIG 95 11/24/2021    TRIG 167 09/07/2014     Lab Results   Component Value Date    HDL 37 (L) 11/24/2021    HDL 69 (H) 09/07/2014     Lab Results   Component Value Date    LDLCALC 54 11/24/2021    LDLCALC 69 09/07/2014     No results found for: LABVLDL, VLDL  No results found for: CHOLHDLRATIO  CMP:    Lab Results   Component Value Date     03/22/2022    K 4.6 03/22/2022    K 4.0 02/15/2022     03/22/2022    CO2 21 03/22/2022    BUN 18 03/22/2022    CREATININE 1.02 03/22/2022    GFRAA >60.0 03/22/2022    LABGLOM 52.1 03/22/2022    GLUCOSE 134 03/22/2022    PROT 9.0 03/15/2022    LABALBU 4.5 03/15/2022    CALCIUM 9.4 03/22/2022    BILITOT 0.5 03/15/2022    ALKPHOS 80 03/15/2022    AST 17 03/15/2022    ALT 8 03/15/2022     BMP:    Lab Results   Component Value Date     03/22/2022    K 4.6 03/22/2022    K 4.0 02/15/2022     03/22/2022    CO2 21 03/22/2022    BUN 18 03/22/2022    LABALBU 4.5 03/15/2022    CREATININE 1.02 03/22/2022    CALCIUM 9.4 03/22/2022    GFRAA >60.0 03/22/2022    LABGLOM 52.1 03/22/2022    GLUCOSE 134 03/22/2022     Magnesium:    Lab Results   Component Value Date    MG 1.8 02/23/2022     TSH:  Lab Results   Component Value Date    TSH 4.500 (H) 03/15/2022     . result  No results for input(s): PROBNP in the last 72 hours. No results for input(s): INR in the last 72 hours. Patient Active Problem List   Diagnosis    Hypothyroidism    Allergic rhinitis    Hypertension    Hyperlipidemia    Lichen planus    Hearing loss    Asthma    [facility] [address]    Syncope    E. coli UTI    AMS (altered mental status)    Cerebrovascular accident (CVA) ([facility] [address]    Hypertensive urgency       Assessment/Plan:     Diagnosis Orders   1. Hypertensive urgency      Advised ER but patient declining at this time. Clonidine increased to 0.2mg PO BID   2. Hyperkalemia      Aldactone previously discontinued. Per BMP on 3/22/22 with potassium level within normal range now 4.6. 3. Renal insufficiency      Mild. Renal function stable per BMP 3/22/22       -Maximize medical therapy-aspirin 81 mg p.o. daily, Coreg 25 mg p.o. twice daily, increase clonidine to 0.2 mg p.o. twice daily, lisinopril 40 mg p.o. daily, Lipitor 20 mg p.o. daily  -Aldactone recently discontinued due to hyperkalemia  -Patient previously reported adverse reaction to hydrochlorothiazide/questionable allergy  **Will consider addition of chlorthalidone in future if BP remains elevated. Patient unable to remember exactly what adverse effect she had from hydrochlorothiazide so doubt true allergy to this class of medication  -Cardiac/less than 2 g sodium diet recommended  -Repeat BMP from 3/22/2022 reviewed and potassium now within normal range. -Recommend routine blood pressure monitoring at home.   Advised patient check blood pressure twice daily in a.m. and p.m. prior to taking medications and record log of blood pressure trends to review at next office visit  Notify office if BP running high with systolic blood pressure 254 mmHg or above or if diastolic blood pressure 85 mmHg or above  Please record heart rate with each blood pressure reading  -Will consider noninvasive coronary evaluation/stress testing in future given risk factors for CAD. No anginal complaints at this time  -Status post MAIK on 11/24/2021 which revealed normal LV systolic function with EF of 60%, no evidence of ASD or PFO and no significant valvular heart disease  **Patient advised ER in office today due to hypertensive urgency with SBP greater than 220 but has declined. Patient is asymptomatic at this time. BP meds adjusted and patient and son agreeable to present immediately to ER if neurologic symptoms develop and or if BP remains elevated upon returning home  -Maintain routine follow-up with neurology as directed given history of TIA in November 2021  -Maintain routine follow-up with PCP--recommend patient complete CT of the abdomen pelvis as ordered by Dr. Steven Dumont due to borderline elevated plasma metanephrine levels to rule out underlying adrenal mass as etiology of patient's uncontrolled hypertension  -May need to consider bilateral renal artery duplex ultrasound to evaluate for underlying renal artery stenosis  **Will refer patient to Dr. Radha Sandoval for further evaluation recommendations regarding BP management as BP remains uncontrolled despite multiple medications and recent medication adjustments  -Follow-up in 4 weeks or sooner if needed      Patient's hypertension is somewhat difficult to manage as she is consistently extremely hypertensive vital signs in office during the past 3 office visits with me as well as office visits with PCP with her systolic greater than 141 mmHg. Review of patient's home blood pressure log shows BP to be very well controlled typically with systolic blood pressure between 110-140s range. Uncertain if patient's hypertensive episodes in office are related to anxiety/possible whitecoat syndrome.

## 2022-04-04 ENCOUNTER — APPOINTMENT (OUTPATIENT)
Dept: ULTRASOUND IMAGING | Age: 82
End: 2022-04-04
Payer: COMMERCIAL

## 2022-04-04 ENCOUNTER — APPOINTMENT (OUTPATIENT)
Dept: CT IMAGING | Age: 82
End: 2022-04-04
Payer: COMMERCIAL

## 2022-04-04 ENCOUNTER — HOSPITAL ENCOUNTER (EMERGENCY)
Age: 82
Discharge: HOME | End: 2022-04-04
Payer: COMMERCIAL

## 2022-04-04 ENCOUNTER — APPOINTMENT (OUTPATIENT)
Dept: GENERAL RADIOLOGY | Age: 82
End: 2022-04-04
Payer: COMMERCIAL

## 2022-04-04 VITALS
SYSTOLIC BLOOD PRESSURE: 173 MMHG | WEIGHT: 110 LBS | TEMPERATURE: 97.9 F | DIASTOLIC BLOOD PRESSURE: 84 MMHG | RESPIRATION RATE: 14 BRPM | HEIGHT: 64 IN | OXYGEN SATURATION: 98 % | BODY MASS INDEX: 18.78 KG/M2 | HEART RATE: 56 BPM

## 2022-04-04 DIAGNOSIS — I16.0 HYPERTENSIVE URGENCY: Primary | ICD-10-CM

## 2022-04-04 LAB
ALBUMIN SERPL-MCNC: 4.2 G/DL (ref 3.5–4.6)
ALP BLD-CCNC: 82 U/L (ref 40–130)
ALT SERPL-CCNC: 11 U/L (ref 0–33)
ANION GAP SERPL CALCULATED.3IONS-SCNC: 11 MEQ/L (ref 9–15)
AST SERPL-CCNC: 22 U/L (ref 0–35)
BASOPHILS ABSOLUTE: 0 K/UL (ref 0–0.2)
BASOPHILS RELATIVE PERCENT: 0.8 %
BILIRUB SERPL-MCNC: 0.7 MG/DL (ref 0.2–0.7)
BUN BLDV-MCNC: 15 MG/DL (ref 8–23)
CALCIUM SERPL-MCNC: 9.8 MG/DL (ref 8.5–9.9)
CHLORIDE BLD-SCNC: 104 MEQ/L (ref 95–107)
CO2: 25 MEQ/L (ref 20–31)
CREAT SERPL-MCNC: 0.88 MG/DL (ref 0.5–0.9)
EKG ATRIAL RATE: 58 BPM
EKG P AXIS: 59 DEGREES
EKG P-R INTERVAL: 162 MS
EKG Q-T INTERVAL: 404 MS
EKG QRS DURATION: 78 MS
EKG QTC CALCULATION (BAZETT): 396 MS
EKG R AXIS: -28 DEGREES
EKG T AXIS: 48 DEGREES
EKG VENTRICULAR RATE: 58 BPM
EOSINOPHILS ABSOLUTE: 0.1 K/UL (ref 0–0.7)
EOSINOPHILS RELATIVE PERCENT: 1.5 %
GFR AFRICAN AMERICAN: >60
GFR AFRICAN AMERICAN: >60
GFR NON-AFRICAN AMERICAN: 53
GFR NON-AFRICAN AMERICAN: >60
GLOBULIN: 4.7 G/DL (ref 2.3–3.5)
GLUCOSE BLD-MCNC: 140 MG/DL (ref 70–99)
HCT VFR BLD CALC: 36.2 % (ref 37–47)
HEMOGLOBIN: 12.5 G/DL (ref 12–16)
LYMPHOCYTES ABSOLUTE: 0.8 K/UL (ref 1–4.8)
LYMPHOCYTES RELATIVE PERCENT: 12.5 %
MAGNESIUM: 1.8 MG/DL (ref 1.7–2.4)
MCH RBC QN AUTO: 32.1 PG (ref 27–31.3)
MCHC RBC AUTO-ENTMCNC: 34.6 % (ref 33–37)
MCV RBC AUTO: 92.6 FL (ref 82–100)
MONOCYTES ABSOLUTE: 0.4 K/UL (ref 0.2–0.8)
MONOCYTES RELATIVE PERCENT: 6 %
NEUTROPHILS ABSOLUTE: 4.8 K/UL (ref 1.4–6.5)
NEUTROPHILS RELATIVE PERCENT: 79.2 %
PDW BLD-RTO: 12.7 % (ref 11.5–14.5)
PERFORMED ON: ABNORMAL
PLATELET # BLD: 170 K/UL (ref 130–400)
POC CREATININE WHOLE BLOOD: 1
POC CREATININE: 1 MG/DL (ref 0.6–1.2)
POC SAMPLE TYPE: ABNORMAL
POTASSIUM SERPL-SCNC: 4.7 MEQ/L (ref 3.4–4.9)
PRO-BNP: 1868 PG/ML
RBC # BLD: 3.9 M/UL (ref 4.2–5.4)
SODIUM BLD-SCNC: 140 MEQ/L (ref 135–144)
TOTAL PROTEIN: 8.9 G/DL (ref 6.3–8)
TROPONIN: <0.01 NG/ML (ref 0–0.01)
WBC # BLD: 6.1 K/UL (ref 4.8–10.8)

## 2022-04-04 PROCEDURE — 74177 CT ABD & PELVIS W/CONTRAST: CPT

## 2022-04-04 PROCEDURE — 36415 COLL VENOUS BLD VENIPUNCTURE: CPT

## 2022-04-04 PROCEDURE — 84484 ASSAY OF TROPONIN QUANT: CPT

## 2022-04-04 PROCEDURE — 93005 ELECTROCARDIOGRAM TRACING: CPT | Performed by: EMERGENCY MEDICINE

## 2022-04-04 PROCEDURE — 99285 EMERGENCY DEPT VISIT HI MDM: CPT

## 2022-04-04 PROCEDURE — 6370000000 HC RX 637 (ALT 250 FOR IP): Performed by: PHYSICIAN ASSISTANT

## 2022-04-04 PROCEDURE — 2580000003 HC RX 258: Performed by: PHYSICIAN ASSISTANT

## 2022-04-04 PROCEDURE — 71045 X-RAY EXAM CHEST 1 VIEW: CPT

## 2022-04-04 PROCEDURE — 93010 ELECTROCARDIOGRAM REPORT: CPT | Performed by: INTERNAL MEDICINE

## 2022-04-04 PROCEDURE — 80053 COMPREHEN METABOLIC PANEL: CPT

## 2022-04-04 PROCEDURE — 83735 ASSAY OF MAGNESIUM: CPT

## 2022-04-04 PROCEDURE — 93975 VASCULAR STUDY: CPT

## 2022-04-04 PROCEDURE — 85025 COMPLETE CBC W/AUTO DIFF WBC: CPT

## 2022-04-04 PROCEDURE — 83880 ASSAY OF NATRIURETIC PEPTIDE: CPT

## 2022-04-04 PROCEDURE — 6360000004 HC RX CONTRAST MEDICATION: Performed by: PHYSICIAN ASSISTANT

## 2022-04-04 RX ORDER — SODIUM CHLORIDE 0.9 % (FLUSH) 0.9 %
10 SYRINGE (ML) INJECTION 2 TIMES DAILY
Status: DISCONTINUED | OUTPATIENT
Start: 2022-04-04 | End: 2022-04-04 | Stop reason: HOSPADM

## 2022-04-04 RX ORDER — ISOSORBIDE MONONITRATE 60 MG/1
60 TABLET, EXTENDED RELEASE ORAL DAILY
Status: DISCONTINUED | OUTPATIENT
Start: 2022-04-04 | End: 2022-04-04 | Stop reason: HOSPADM

## 2022-04-04 RX ORDER — LOSARTAN POTASSIUM 50 MG/1
50 TABLET ORAL DAILY
Status: DISCONTINUED | OUTPATIENT
Start: 2022-04-04 | End: 2022-04-04 | Stop reason: HOSPADM

## 2022-04-04 RX ORDER — LOSARTAN POTASSIUM 50 MG/1
50 TABLET ORAL DAILY
Status: DISCONTINUED | OUTPATIENT
Start: 2022-04-04 | End: 2022-04-04 | Stop reason: SDUPTHER

## 2022-04-04 RX ADMIN — IOPAMIDOL 100 ML: 612 INJECTION, SOLUTION INTRAVENOUS at 15:11

## 2022-04-04 RX ADMIN — ISOSORBIDE MONONITRATE 60 MG: 60 TABLET, EXTENDED RELEASE ORAL at 17:44

## 2022-04-04 RX ADMIN — LOSARTAN POTASSIUM 50 MG: 50 TABLET, FILM COATED ORAL at 14:11

## 2022-04-04 RX ADMIN — SODIUM CHLORIDE, PRESERVATIVE FREE 10 ML: 5 INJECTION INTRAVENOUS at 15:11

## 2022-04-04 NOTE — ED NOTES
Spoke to son \"Elliot\", instructed him to call Dr. Penelope Reyes office in the morning and take his mother to tomorrow's appointment per provider and Dr. Сергей Nguyen.   Instructed to take normal home medications     Kera Mcleod RN  04/04/22 [id number]

## 2022-04-04 NOTE — ED NOTES
Returned. Placed back onto monitor. Denies any complaints at this time. Aware we are waiting on results. Call light within reach.       Jose Berrios RN  04/04/22 [id number]

## 2022-04-04 NOTE — ED NOTES
Discharge instructions provided to patient. Patient aware needs to call Dr. Dinah Lee in the morning and go into office tomorrow. Verbalized understanding with no additional questions. No signs of acute distress noted at this time. [facility] bedside to drive patient home.       Edilma Schroeder RN  04/04/22 [id number]

## 2022-04-04 NOTE — ED PROVIDER NOTES
[address] [facility] ED  eMERGENCY dEPARTMENTeNCOUnter      Pt Name: Lien Motta  MRN: [id number]  [place] 1940  Date ofevaluation: 4/4/2022  Provider: Les Hernandez PA-C    CHIEF COMPLAINT       Chief Complaint   Patient presents with    Hypertension     250/110 per EMS         HISTORY OF PRESENT ILLNESS   (Location/Symptom, Timing/Onset,Context/Setting, Quality, Duration, Modifying Factors, Severity)  Note limiting factors. Lien Motta is a [de-identified] y.o. female who presents to the emergency department uncontrolled hypertension. Patient has been seeing her family doctor as well as cardiology with medication changes. She is a little bit limited on medications due to her allergies to amlodipine hydralazine and hydrochlorothiazide but she is unsure what those allergies are when I asked her. She denies having any chest pain dizziness vision changes shortness of breath. She was does have a CAT scan and an ultrasound done to see if that is the source of her resistant hypertension. HPI    NursingNotes were reviewed. REVIEW OF SYSTEMS    (2-9 systems for level 4, 10 or more for level 5)     Review of Systems   Constitutional: Negative for chills, diaphoresis, fatigue and fever. HENT: Negative for congestion, rhinorrhea and sore throat. Eyes: Negative for photophobia and pain. Respiratory: Negative for cough and shortness of breath. Cardiovascular: Negative for chest pain and palpitations. Gastrointestinal: Negative for abdominal pain, diarrhea, nausea and vomiting. Genitourinary: Negative for dysuria and flank pain. Musculoskeletal: Negative for back pain. Skin: Negative for rash. Neurological: Negative for dizziness, light-headedness and headaches. Psychiatric/Behavioral: Negative. All other systems reviewed and are negative. Except as noted above the remainder of the review of systems was reviewed and negative.        PAST MEDICAL HISTORY     Past Medical History: Diagnosis Date    Allergic rhinitis     Asthma     uses flovent    Hearing loss     Hyperlipidemia     Hypertension     Hypothyroidism     Lichen planus     Dr. Pro Chaparro       Past Surgical History:   Procedure Laterality Date    HYSTERECTOMY, TOTAL ABDOMINAL      TONSILLECTOMY           CURRENT MEDICATIONS       Previous Medications    ARTIFICIAL TEAR SOLUTION (TEARS AGAIN ADVANCED EYELID) SOLN    Apply to eye    ASPIRIN 81 MG CHEWABLE TABLET    Take 1 tablet by mouth daily    ATORVASTATIN (LIPITOR) 20 MG TABLET    Take 1 tablet by mouth daily    CARVEDILOL (COREG) 25 MG TABLET    Take 1 tablet by mouth 2 times daily    CLONIDINE (CATAPRES) 0.2 MG TABLET    Take 1 tablet by mouth 2 times daily    FLOVENT HFA 44 MCG/ACT INHALER    INHALE 1 PUFF BY MOUTH TWICE DAILY FOR 90 DAYS. LEVOTHYROXINE (SYNTHROID) 75 MCG TABLET    Take 1 tablet by mouth Daily    LISINOPRIL (PRINIVIL;ZESTRIL) 40 MG TABLET    Take 1 tablet by mouth daily       ALLERGIES     Albuterol, Amlodipine, Hydralazine, Metformin and related, and Zoster vac recomb adjuvanted    FAMILY HISTORY       Family History   Problem Relation Age of Onset    High Blood Pressure Mother           SOCIAL HISTORY       Social History     Socioeconomic History    Marital status: Single     Spouse name: Not on file    Number of children: 2    Years of education: Not on file    Highest education level: Not on file   Occupational History    Not on file   Tobacco Use    Smoking status: Never Smoker    Smokeless tobacco: Never Used   Substance and Sexual Activity    Alcohol use: No    Drug use: No    Sexual activity: Not Currently   Other Topics Concern    Not on file   Social History Narrative    Has 2 sons. One of her sons lives with her in a mobile home.      Social Determinants of Health     Financial Resource Strain: Low Risk     Difficulty of Paying Living Expenses: Not hard at all   Food Insecurity: No Food Insecurity  Worried About Running Out of Food in the Last Year: Never true    [place]ev[place] of Food in the Last Year: Never true   Transportation Needs:     Lack of Transportation (Medical): Not on file    Lack of Transportation (Non-Medical): Not on file   Physical Activity:     Days of Exercise per Week: Not on file    Minutes of Exercise per Session: Not on file   Stress:     Feeling of Stress : Not on file   Social Connections:     Frequency of Communication with Friends and Family: Not on file    Frequency of Social Gatherings with Friends and Family: Not on file    Attends Jainism Services: Not on file    Active Member of [address] [place] [address] [employer] or Organizations: Not on file    Attends Club or Organization Meetings: Not on file    Marital Status: Not on file   Intimate Partner Violence:     Fear of Current or Ex-Partner: Not on file    Emotionally Abused: Not on file    Physically Abused: Not on file    Sexually Abused: Not on file   Housing Stability:     Unable to Pay for Housing in the Last Year: Not on file    Number of [place] in the Last Year: Not on file    Unstable Housing in the Last Year: Not on file       SCREENINGS      @FLOW([id number])@      PHYSICAL EXAM    (up to 7 for level 4, 8 or more for level 5)     ED Triage Vitals   BP Temp Temp Source Pulse Resp SpO2 Height Weight   04/04/22 1314 04/04/22 1314 04/04/22 1630 04/04/22 1314 04/04/22 1314 04/04/22 1314 04/04/22 1314 04/04/22 1314   (!) 227/94 97.9 °F (36.6 °C) Oral 51 15 99 % 5' 4\" (1.626 m) 110 lb (49.9 kg)       Physical Exam  Vitals and nursing note reviewed. Constitutional:       General: She is not in acute distress. Appearance: Normal appearance. She is well-developed. She is not diaphoretic. HENT:      Head: Normocephalic and atraumatic.       Ears:      Comments: Patient is extremely hard of hearing  Eyes:      General: Lids are normal.      Conjunctiva/sclera: Conjunctivae normal.   Cardiovascular:      Rate and Rhythm: Normal rate and regular rhythm. Pulses: Normal pulses. Heart sounds: Normal heart sounds. Pulmonary:      Effort: Pulmonary effort is normal.      Breath sounds: Normal breath sounds. Abdominal:      General: Bowel sounds are normal.      Palpations: Abdomen is soft. Tenderness: There is no abdominal tenderness. Musculoskeletal:      Cervical back: Normal range of motion and neck supple. Lymphadenopathy:      Cervical: No cervical adenopathy. Skin:     General: Skin is warm and dry. Capillary Refill: Capillary refill takes less than 2 seconds. Findings: No rash. Neurological:      Mental Status: She is alert and oriented to person, place, and time. Psychiatric:         Thought Content: Thought content normal.         Judgment: Judgment normal.         DIAGNOSTIC RESULTS     EKG: All EKG's are interpreted by the Emergency Department Physician who either signs or Co-signsthis chart in the absence of a cardiologist.    Sinus kaity 58bpm no acute st changes no ecotpy     RADIOLOGY:   Non-plain filmimages such as CT, Ultrasound and MRI are read by the radiologist. Plain radiographic images are visualized and preliminarily interpreted by the emergency physician with the below findings:        Interpretation per the Radiologist below, if available at the time ofthis note:    XR CHEST PORTABLE   Final Result    There are no acute infiltrates or effusions. There are chronic emphysematous changes throughout both lungs. CT ABDOMEN PELVIS W IV CONTRAST Additional Contrast? None   Final Result      There are stones in the gallbladder with fluid surrounding the gallbladder wall which is worrisome for cholecystitis. Otherwise there are no acute intra-abdominal changes. US DUP ABD PEL RETRO SCROT COMPLETE   Final Result      NO SONOGRAPHIC EVIDENCE, BILATERAL RENAL ARTERY STENOSIS. SMALL RIGHT KIDNEY.             ED BEDSIDE ULTRASOUND:   Performed by ED Physician - none    LABS:  Labs Reviewed   COMPREHENSIVE METABOLIC PANEL - Abnormal; Notable for the following components:       Result Value    Glucose 140 (*)     Total Protein 8.9 (*)     Globulin 4.7 (*)     All other components within normal limits   CBC WITH AUTO DIFFERENTIAL - Abnormal; Notable for the following components:    RBC 3.90 (*)     Hematocrit 36.2 (*)     MCH 32.1 (*)     Lymphocytes Absolute 0.8 (*)     All other components within normal limits   POCT CREATININE - URINE - Normal   TROPONIN   BRAIN NATRIURETIC PEPTIDE   MAGNESIUM       All other labs were within normal range or not returned as of this dictation. EMERGENCY DEPARTMENT COURSE and DIFFERENTIAL DIAGNOSIS/MDM:   Vitals:    Vitals:    04/04/22 1700 04/04/22 1705 04/04/22 1730 04/04/22 1800   BP: (!) 222/77 (!) 222/77 (!) 208/81 (!) 191/102   Pulse: 54 57 (!) 49 56   Resp: 13 15 17 16   Temp:       TempSrc:       SpO2: 100% 100% 99% 100%   Weight:       Height:               MDM    Patient presents with uncontrolled hypertension and has been having systolic numbers over 973. She is not experiencing any headache dizziness vision changes chest pain or shortness of breath though. Patient is extremely hard of hearing so I used the phone without talk to text to gather history and educated on what the plan is. Discussed blood pressure monitoring with dr Crista Leventhal, rec'd losartan. Ct abd pelvis done to r/o mass. Ct scan concerning for cholecystits, however went and reexamined the patient and she has no ttp to epi ruq whats so ever even with deep palpation and she is a small body habitus, she has not been ahving any fevers and has no leukocytosis, discussed cat scan with dr Crista Leventhal, she does not believe this to be acute tessa, can f/u with pcp or gen surg pos for outpatient us but if she starts to have n/v and abdominal pain she needs to be seen right away. Pt is asympomatic hypertensive urgency that has improved gradually with meds.  Spoke to Dr. Og Mcclain who pt says she is supposed to see tmrw but her son actually cancelled the apt bc she came to the ED. Dr. Glenwood Hashimoto rec imdur for the pt she was given in the ED and then he would add her on to the schedule but pt but call in the morning. This was told to the patient and nurse told this to her son. She understands that this apt is very importmant and not to miss. She was instructed to return to ed for symptoms such as ha, dizziness, vision changes, speech changes, weakness, cp or sob. Pt verbalized understanding. Pt stable and ready for d/c     REASSESSMENT          CRITICAL CARE TIME   Total Critical Care time was  minutes, excluding separatelyreportable procedures. There was a high probability ofclinically significant/life threatening deterioration in the patient's condition which required my urgent intervention. CONSULTS:  None    PROCEDURES:  Unless otherwise noted below, none     Procedures    FINAL IMPRESSION      1.  Hypertensive urgency          DISPOSITION/PLAN   DISPOSITION Decision To Discharge 04/04/2022 05:08:00 PM      PATIENT REFERREDTO:  Azeb Shankar MD  [address] [place] [address]  [phone]    Schedule an appointment as soon as possible for a visit in 2 days      Je Sabillon MD  [address] [address]  [phone]    Schedule an appointment as soon as possible for a visit in 1 day        DISCHARGEMEDICATIONS:  New Prescriptions    No medications on file          (Please note that portions of this note were completed with a voice recognition program.  Efforts were made to edit the dictations but occasionally words are mis-transcribed.)    Jake Simon (electronically signed)  Attending Emergency Physician         Saurabh Stafford PA-C  04/05/22 3733

## 2022-04-04 NOTE — ED TRIAGE NOTES
Patient called 911 when sys BP was \"300\". [facility] in route was 250/110. Patient complaint of \"bugging my eyes\" but denies headache or other symptoms. Patient is \"boardline diabetic\" and history of Hypertension  Patient is [place], A&O x 4.

## 2022-04-04 NOTE — ED NOTES
Patient son called for update. Patient given this nurse verbal permission to speak to son. Son updated on current care plan.          Isaac Case RN  04/04/22 2854

## 2022-04-05 ENCOUNTER — OFFICE VISIT (OUTPATIENT)
Dept: CARDIOLOGY CLINIC | Age: 82
End: 2022-04-05
Payer: COMMERCIAL

## 2022-04-05 VITALS
OXYGEN SATURATION: 97 % | SYSTOLIC BLOOD PRESSURE: 186 MMHG | BODY MASS INDEX: 18.61 KG/M2 | DIASTOLIC BLOOD PRESSURE: 82 MMHG | RESPIRATION RATE: 16 BRPM | HEART RATE: 61 BPM | WEIGHT: 109 LBS | HEIGHT: 64 IN

## 2022-04-05 DIAGNOSIS — I16.0 HYPERTENSIVE URGENCY: Primary | ICD-10-CM

## 2022-04-05 DIAGNOSIS — E78.5 DYSLIPIDEMIA: ICD-10-CM

## 2022-04-05 PROCEDURE — 99214 OFFICE O/P EST MOD 30 MIN: CPT | Performed by: INTERNAL MEDICINE

## 2022-04-05 RX ORDER — ISOSORBIDE MONONITRATE 60 MG/1
60 TABLET, EXTENDED RELEASE ORAL DAILY
Qty: 90 TABLET | Refills: 3 | Status: SHIPPED | OUTPATIENT
Start: 2022-04-05 | End: 2022-06-03 | Stop reason: SDUPTHER

## 2022-04-05 RX ORDER — CLONIDINE HYDROCHLORIDE 0.2 MG/1
0.2 TABLET ORAL 3 TIMES DAILY
Qty: 270 TABLET | Refills: 3 | Status: SHIPPED | OUTPATIENT
Start: 2022-04-05 | End: 2022-06-03

## 2022-04-05 ASSESSMENT — ENCOUNTER SYMPTOMS
SHORTNESS OF BREATH: 0
RHINORRHEA: 0
COUGH: 0
NAUSEA: 0
STRIDOR: 0
EYE PAIN: 0
VOMITING: 0
SHORTNESS OF BREATH: 0
RESPIRATORY NEGATIVE: 1
EYES NEGATIVE: 1
NAUSEA: 0
CHEST TIGHTNESS: 0
SORE THROAT: 0
COUGH: 0
DIARRHEA: 0
BLOOD IN STOOL: 0
GASTROINTESTINAL NEGATIVE: 1
WHEEZING: 0
ABDOMINAL PAIN: 0
BACK PAIN: 0
PHOTOPHOBIA: 0

## 2022-04-05 NOTE — PROGRESS NOTES
NEW PATIENT        Patient: Aniyah Daniel  YOB: 1940  MRN: [id number]    Chief Complaint: HTN   Chief Complaint   Patient presents with    Hypertension     Cindi patient    Follow[facility]Up from [facility] 4/4/22 for HTN       CV Data:  11/21 MAIK EF 60%   3/22 Renal Duplex negative ARIADNA. Subjective/HPI in ER yesterday for HTN> she is compliant with meds. But still elevated. CT Chest noted. Renal duplex no ariadna. Pt has no cp no sob. She wants to go back exercising. nonsmopker  No etoh  Lives with son. EKG:    Past Medical History:   Diagnosis Date    Allergic rhinitis     Asthma     uses flovent    Hearing loss     Hyperlipidemia     Hypertension     Hypothyroidism     Lichen planus     Dr. Renny Villagomez       Past Surgical History:   Procedure Laterality Date    HYSTERECTOMY, TOTAL ABDOMINAL      TONSILLECTOMY         Family History   Problem Relation Age of Onset    High Blood Pressure Mother        Social History     Socioeconomic History    Marital status: Single     Spouse name: None    Number of children: 2    Years of education: None    Highest education level: None   Occupational History    None   Tobacco Use    Smoking status: Never Smoker    Smokeless tobacco: Never Used   Substance and Sexual Activity    Alcohol use: No    Drug use: No    Sexual activity: Not Currently   Other Topics Concern    None   Social History Narrative    Has 2 sons. One of her sons lives with her in a mobile home. Social Determinants of Health     Financial Resource Strain: Low Risk     Difficulty of Paying Living Expenses: Not hard at all   Food Insecurity: No Food Insecurity    Worried About Running Out of Food in the Last Year: Never true    Genevivee of Food in the Last Year: Never true   Transportation Needs:     Lack of Transportation (Medical): Not on file    Lack of Transportation (Non-Medical):  Not on file   Physical Activity:     Days of Exercise per Week: Not on file    Minutes of Exercise per Session: Not on file   Stress:     Feeling of Stress : Not on file   Social Connections:     Frequency of Communication with Friends and Family: Not on file    Frequency of Social Gatherings with Friends and Family: Not on file    Attends Anabaptism Services: Not on file    Active Member of [address] [place] [address] or Organizations: Not on file    Attends Club or Organization Meetings: Not on file    Marital Status: Not on file   Intimate Partner Violence:     Fear of Current or Ex-Partner: Not on file    Emotionally Abused: Not on file    Physically Abused: Not on file    Sexually Abused: Not on file   Housing Stability:     Unable to Pay for Housing in the Last Year: Not on file    Number of [place] in the Last Year: Not on file    Unstable Housing in the Last Year: Not on file       Allergies   Allergen Reactions    Albuterol     Amlodipine     Hydralazine Itching    Metformin And Related     Zoster Vac Recomb Adjuvanted Itching     Shingles Vaccine        Current Outpatient Medications   Medication Sig Dispense Refill    cloNIDine (CATAPRES) 0.2 MG tablet Take 1 tablet by mouth 3 times daily 270 tablet 3    isosorbide mononitrate (IMDUR) 60 MG extended release tablet Take 1 tablet by mouth daily 90 tablet 3    carvedilol (COREG) 25 MG tablet Take 1 tablet by mouth 2 times daily 60 tablet 3    lisinopril (PRINIVIL;ZESTRIL) 40 MG tablet Take 1 tablet by mouth daily 30 tablet 3    levothyroxine (SYNTHROID) 75 MCG tablet Take 1 tablet by mouth Daily 30 tablet 1    atorvastatin (LIPITOR) 20 MG tablet Take 1 tablet by mouth daily 30 tablet 1    aspirin 81 MG chewable tablet Take 1 tablet by mouth daily 30 tablet 3    Artificial Tear Solution (TEARS AGAIN ADVANCED EYELID) SOLN Apply to eye      FLOVENT HFA 44 MCG/ACT inhaler INHALE 1 PUFF BY MOUTH TWICE DAILY FOR 90 DAYS. No current facility-administered medications for this visit.        Review of Systems:   Review of Systems   Constitutional: Negative. Negative for diaphoresis and fatigue. HENT: Negative. Eyes: Negative. Respiratory: Negative. Negative for cough, chest tightness, shortness of breath, wheezing and stridor. Cardiovascular: Negative. Negative for chest pain, palpitations and leg swelling. Gastrointestinal: Negative. Negative for blood in stool and nausea. Genitourinary: Negative. Musculoskeletal: Negative. Skin: Negative. Neurological: Negative. Negative for dizziness, syncope, weakness and light-headedness. Hematological: Negative. Psychiatric/Behavioral: Negative. Physical Examination:    BP (!) 186/82 (Site: Left Upper Arm, Position: Sitting, Cuff Size: Medium Adult)   Pulse 61   Resp 16   Ht 5' 4\" (1.626 m)   Wt 109 lb (49.4 kg)   SpO2 97%   BMI 18.71 kg/m²    Physical Exam   Constitutional: She appears healthy. No distress. HENT:   Normal cephalic and Atraumatic   Eyes: Pupils are equal, round, and reactive to light. Neck: Thyroid normal. No JVD present. No neck adenopathy. No thyromegaly present. Cardiovascular: Normal rate, regular rhythm, normal heart sounds, intact distal pulses and normal pulses. Pulmonary/Chest: Effort normal and breath sounds normal. She has no wheezes. She has no rales. She exhibits no tenderness. Abdominal: Soft. Bowel sounds are normal. There is no abdominal tenderness. Musculoskeletal:         General: No tenderness or edema. Normal range of motion. Cervical back: Normal range of motion and neck supple. Neurological: She is alert and oriented to person, place, and time. Skin: Skin is warm. No cyanosis. Nails show no clubbing.        LABS:  CBC:   Lab Results   Component Value Date    WBC 6.1 04/04/2022    RBC 3.90 04/04/2022    HGB 12.5 04/04/2022    HCT 36.2 04/04/2022    MCV 92.6 04/04/2022    MCH 32.1 04/04/2022    MCHC 34.6 04/04/2022    RDW 12.7 04/04/2022     04/04/2022    MPV 9.7 09/07/2014 Lipids:  Lab Results   Component Value Date    CHOL 110 11/24/2021    CHOL 171 09/07/2014     Lab Results   Component Value Date    TRIG 95 11/24/2021    TRIG 167 09/07/2014     Lab Results   Component Value Date    HDL 37 (L) 11/24/2021    HDL 69 (H) 09/07/2014     Lab Results   Component Value Date    LDLCALC 54 11/24/2021    LDLCALC 69 09/07/2014     No results found for: LABVLDL, VLDL  No results found for: CHOLHDLRATIO  CMP:    Lab Results   Component Value Date     04/04/2022    K 4.7 04/04/2022    K 4.0 02/15/2022     04/04/2022    CO2 25 04/04/2022    BUN 15 04/04/2022    CREATININE 1.0 04/04/2022    CREATININE 0.88 04/04/2022    GFRAA >60 04/04/2022    LABGLOM 53 04/04/2022    GLUCOSE 140 04/04/2022    PROT 8.9 04/04/2022    LABALBU 4.2 04/04/2022    CALCIUM 9.8 04/04/2022    BILITOT 0.7 04/04/2022    ALKPHOS 82 04/04/2022    AST 22 04/04/2022    ALT 11 04/04/2022     BMP:    Lab Results   Component Value Date     04/04/2022    K 4.7 04/04/2022    K 4.0 02/15/2022     04/04/2022    CO2 25 04/04/2022    BUN 15 04/04/2022    LABALBU 4.2 04/04/2022    CREATININE 1.0 04/04/2022    CREATININE 0.88 04/04/2022    CALCIUM 9.8 04/04/2022    GFRAA >60 04/04/2022    LABGLOM 53 04/04/2022    GLUCOSE 140 04/04/2022     Magnesium:    Lab Results   Component Value Date    MG 1.8 04/04/2022     TSH:  Lab Results   Component Value Date    TSH 4.500 (H) 03/15/2022             Patient Active Problem List   Diagnosis    Hypothyroidism    Allergic rhinitis    Hypertension    Hyperlipidemia    Lichen planus    Hearing loss    Asthma    [facility]    Syncope    E. coli UTI    AMS (altered mental status)    Cerebrovascular accident (CVA) ([facility]    Hypertensive urgency       Medications Discontinued During This Encounter   Medication Reason    cloNIDine (CATAPRES) 0.2 MG tablet REORDER       Modified Medications    Modified Medication Previous Medication    CLONIDINE (CATAPRES) 0.2 MG TABLET cloNIDine (CATAPRES) 0.2 MG tablet       Take 1 tablet by mouth 3 times daily    Take 1 tablet by mouth 2 times daily       Orders Placed This Encounter   Medications    cloNIDine (CATAPRES) 0.2 MG tablet     Sig: Take 1 tablet by mouth 3 times daily     Dispense:  270 tablet     Refill:  3    isosorbide mononitrate (IMDUR) 60 MG extended release tablet     Sig: Take 1 tablet by mouth daily     Dispense:  90 tablet     Refill:  3       Assessment/Plan:    1. Hypertensive urgency  Advance CLonidine to 0.2 tid. Add IN=kim 60. RTO 3 weeks. 2. Dyslipidemia  Continue statin. Low fat diet    Pt has no angina. can resume exercise activity. Counseling:  Heart Healthy Lifestyle, Low Salt Diet, Take Precautions to Prevent Falls and Walk Daily    Return in about 3 weeks (around 4/26/2022).     Electronically signed by Lisandro Michelle MD on 4/5/2022 at 3:02 PM

## 2022-04-11 ENCOUNTER — HOSPITAL ENCOUNTER (EMERGENCY)
Age: 82
Discharge: HOME | End: 2022-04-11
Payer: COMMERCIAL

## 2022-04-11 ENCOUNTER — APPOINTMENT (OUTPATIENT)
Dept: CT IMAGING | Age: 82
End: 2022-04-11
Payer: COMMERCIAL

## 2022-04-11 VITALS
DIASTOLIC BLOOD PRESSURE: 87 MMHG | OXYGEN SATURATION: 96 % | SYSTOLIC BLOOD PRESSURE: 154 MMHG | TEMPERATURE: 97.8 F | BODY MASS INDEX: 18.88 KG/M2 | HEART RATE: 56 BPM | WEIGHT: 110 LBS | RESPIRATION RATE: 13 BRPM

## 2022-04-11 DIAGNOSIS — I16.0 HYPERTENSIVE URGENCY: Primary | ICD-10-CM

## 2022-04-11 LAB
ALBUMIN SERPL-MCNC: 4.2 G/DL (ref 3.5–4.6)
ALP BLD-CCNC: 70 U/L (ref 40–130)
ALT SERPL-CCNC: 9 U/L (ref 0–33)
ANION GAP SERPL CALCULATED.3IONS-SCNC: 9 MEQ/L (ref 9–15)
AST SERPL-CCNC: 18 U/L (ref 0–35)
BASOPHILS ABSOLUTE: 0 K/UL (ref 0–0.2)
BASOPHILS RELATIVE PERCENT: 0.5 %
BILIRUB SERPL-MCNC: 0.3 MG/DL (ref 0.2–0.7)
BUN BLDV-MCNC: 25 MG/DL (ref 8–23)
CALCIUM SERPL-MCNC: 9.2 MG/DL (ref 8.5–9.9)
CHLORIDE BLD-SCNC: 104 MEQ/L (ref 95–107)
CO2: 25 MEQ/L (ref 20–31)
CREAT SERPL-MCNC: 1.16 MG/DL (ref 0.5–0.9)
EKG ATRIAL RATE: 51 BPM
EKG P AXIS: 64 DEGREES
EKG P-R INTERVAL: 176 MS
EKG Q-T INTERVAL: 422 MS
EKG QRS DURATION: 70 MS
EKG QTC CALCULATION (BAZETT): 388 MS
EKG R AXIS: -29 DEGREES
EKG T AXIS: 20 DEGREES
EKG VENTRICULAR RATE: 51 BPM
EOSINOPHILS ABSOLUTE: 0.1 K/UL (ref 0–0.7)
EOSINOPHILS RELATIVE PERCENT: 1.1 %
GFR AFRICAN AMERICAN: 54.3
GFR NON-AFRICAN AMERICAN: 44.9
GLOBULIN: 4 G/DL (ref 2.3–3.5)
GLUCOSE BLD-MCNC: 179 MG/DL (ref 70–99)
HCT VFR BLD CALC: 32 % (ref 37–47)
HEMOGLOBIN: 10.9 G/DL (ref 12–16)
LYMPHOCYTES ABSOLUTE: 0.7 K/UL (ref 1–4.8)
LYMPHOCYTES RELATIVE PERCENT: 10 %
MCH RBC QN AUTO: 31.9 PG (ref 27–31.3)
MCHC RBC AUTO-ENTMCNC: 34 % (ref 33–37)
MCV RBC AUTO: 93.6 FL (ref 82–100)
MONOCYTES ABSOLUTE: 0.5 K/UL (ref 0.2–0.8)
MONOCYTES RELATIVE PERCENT: 7.4 %
NEUTROPHILS ABSOLUTE: 5.5 K/UL (ref 1.4–6.5)
NEUTROPHILS RELATIVE PERCENT: 81 %
PDW BLD-RTO: 13 % (ref 11.5–14.5)
PLATELET # BLD: 155 K/UL (ref 130–400)
POTASSIUM SERPL-SCNC: 4.1 MEQ/L (ref 3.4–4.9)
RBC # BLD: 3.41 M/UL (ref 4.2–5.4)
SODIUM BLD-SCNC: 138 MEQ/L (ref 135–144)
T4 FREE: 1.39 NG/DL (ref 0.84–1.68)
TOTAL PROTEIN: 8.2 G/DL (ref 6.3–8)
TROPONIN: <0.01 NG/ML (ref 0–0.01)
TSH REFLEX: 10.19 UIU/ML (ref 0.44–3.86)
WBC # BLD: 6.7 K/UL (ref 4.8–10.8)

## 2022-04-11 PROCEDURE — 99285 EMERGENCY DEPT VISIT HI MDM: CPT

## 2022-04-11 PROCEDURE — 93005 ELECTROCARDIOGRAM TRACING: CPT

## 2022-04-11 PROCEDURE — 80053 COMPREHEN METABOLIC PANEL: CPT

## 2022-04-11 PROCEDURE — 84439 ASSAY OF FREE THYROXINE: CPT

## 2022-04-11 PROCEDURE — 84443 ASSAY THYROID STIM HORMONE: CPT

## 2022-04-11 PROCEDURE — 6370000000 HC RX 637 (ALT 250 FOR IP): Performed by: STUDENT IN AN ORGANIZED HEALTH CARE EDUCATION/TRAINING PROGRAM

## 2022-04-11 PROCEDURE — 84484 ASSAY OF TROPONIN QUANT: CPT

## 2022-04-11 PROCEDURE — 6370000000 HC RX 637 (ALT 250 FOR IP)

## 2022-04-11 PROCEDURE — 36415 COLL VENOUS BLD VENIPUNCTURE: CPT

## 2022-04-11 PROCEDURE — 85025 COMPLETE CBC W/AUTO DIFF WBC: CPT

## 2022-04-11 PROCEDURE — 70450 CT HEAD/BRAIN W/O DYE: CPT

## 2022-04-11 RX ORDER — ISOSORBIDE DINITRATE 20 MG/1
20 TABLET ORAL ONCE
Status: DISCONTINUED | OUTPATIENT
Start: 2022-04-11 | End: 2022-04-11

## 2022-04-11 RX ORDER — LISINOPRIL 40 MG/1
40 TABLET ORAL DAILY
Qty: 90 TABLET | Refills: 2 | Status: SHIPPED | OUTPATIENT
Start: 2022-04-11 | End: 2022-06-03 | Stop reason: SDUPTHER

## 2022-04-11 RX ORDER — ISOSORBIDE DINITRATE 20 MG/1
20 TABLET ORAL ONCE
Status: COMPLETED | OUTPATIENT
Start: 2022-04-11 | End: 2022-04-11

## 2022-04-11 RX ORDER — ISOSORBIDE DINITRATE 20 MG/1
40 TABLET ORAL ONCE
Status: DISCONTINUED | OUTPATIENT
Start: 2022-04-11 | End: 2022-04-11

## 2022-04-11 RX ORDER — CLONIDINE HYDROCHLORIDE 0.1 MG/1
0.2 TABLET ORAL ONCE
Status: COMPLETED | OUTPATIENT
Start: 2022-04-11 | End: 2022-04-11

## 2022-04-11 RX ORDER — LOSARTAN POTASSIUM 50 MG/1
50 TABLET ORAL ONCE
Status: COMPLETED | OUTPATIENT
Start: 2022-04-11 | End: 2022-04-11

## 2022-04-11 RX ADMIN — CLONIDINE HYDROCHLORIDE 0.2 MG: 0.1 TABLET ORAL at 03:00

## 2022-04-11 RX ADMIN — ISOSORBIDE DINITRATE 20 MG: 20 TABLET ORAL at 04:29

## 2022-04-11 RX ADMIN — LOSARTAN POTASSIUM 50 MG: 50 TABLET, FILM COATED ORAL at 01:50

## 2022-04-11 ASSESSMENT — ENCOUNTER SYMPTOMS
NAUSEA: 0
VOMITING: 0
ABDOMINAL PAIN: 0
PHOTOPHOBIA: 0
DIARRHEA: 0
SHORTNESS OF BREATH: 0
COUGH: 0

## 2022-04-11 NOTE — ED NOTES
Attempts to reach patients son for ride home from ED unsuccessful, left message on voicemail to return call.      [facility]  04/11/22 [id number]

## 2022-04-11 NOTE — ED PROVIDER NOTES
[address] [facility] ED  eMERGENCY dEPARTMENT eNCOUnter      Pt Name: Sunday Lee  MRN: [id number]  [place] 1940  Date of evaluation: 4/11/2022  Provider: RUPESH Moody        HISTORY OF PRESENT ILLNESS    Sunday Lee is a [de-identified] y.o. female per chart review has ah/o HTN, HLD, hypothyroidism, CVA. Patient presents to the ED with elevated blood pressure of which she has a history. She is also stating she has been intermittently dizzy today but reports this has been ongoing for 8 months. No headache, nausea, vomiting, chest pain, shortness of breath, palpitation, weakness, numbness, visual symptoms, speech disturbance, confusion. Patient states she was told if her blood pressure levels are high to come to the ED which is why she presents today. She is following closely with Dr. Jose Luis Tobias and was initiated on new blood pressure medications last week after ED visit for uncontrolled HTN. She was initiated on Imdur and clonidine. She has been compliant. Also in ED last week evaluated for SHAMEKA contributing to her resistant HTN which was negative. Patient also reporting recent thyroid laboratory abnormalities and requesting thyroid studies today. REVIEW OF SYSTEMS       Review of Systems   Constitutional: Negative for chills and fever. HENT: Negative for congestion. Eyes: Negative for photophobia. Respiratory: Negative for cough and shortness of breath. Cardiovascular: Negative for chest pain. Gastrointestinal: Negative for abdominal pain, diarrhea, nausea and vomiting. Genitourinary: Negative for difficulty urinating. Musculoskeletal: Negative for myalgias. Neurological: Positive for dizziness. Negative for headaches. Psychiatric/Behavioral: Negative for confusion. Except as noted above the remainder of the review of systems was reviewed and negative.        PAST MEDICAL HISTORY     Past Medical History:   Diagnosis Date    Allergic rhinitis     Asthma     uses flovent    Hearing loss     Hyperlipidemia     Hypertension     Hypothyroidism     Lichen planus     Dr. Rio Gillespie       Past Surgical History:   Procedure Laterality Date    HYSTERECTOMY, TOTAL ABDOMINAL      TONSILLECTOMY           CURRENT MEDICATIONS       Discharge Medication List as of 4/11/2022  4:51 AM      CONTINUE these medications which have NOT CHANGED    Details   cloNIDine (CATAPRES) 0.2 MG tablet Take 1 tablet by mouth 3 times daily, Disp-270 tablet, R-3Normal      isosorbide mononitrate (IMDUR) 60 MG extended release tablet Take 1 tablet by mouth daily, Disp-90 tablet, R-3Normal      carvedilol (COREG) 25 MG tablet Take 1 tablet by mouth 2 times daily, Disp-60 tablet, R-3Normal      lisinopril (PRINIVIL;ZESTRIL) 40 MG tablet Take 1 tablet by mouth daily, Disp-30 tablet, R-3Normal      levothyroxine (SYNTHROID) 75 MCG tablet Take 1 tablet by mouth Daily, Disp-30 tablet, R-1Normal      atorvastatin (LIPITOR) 20 MG tablet Take 1 tablet by mouth daily, Disp-30 tablet, R-1Normal      aspirin 81 MG chewable tablet Take 1 tablet by mouth daily, Disp-30 tablet, R-3Normal      Artificial Tear Solution (TEARS AGAIN ADVANCED EYELID) SOLN Apply to eyeHistorical Med      FLOVENT HFA 44 MCG/ACT inhaler INHALE 1 PUFF BY MOUTH TWICE DAILY FOR 90 DAYS., DAWHistorical Med             ALLERGIES     Albuterol, Amlodipine, Hydralazine, Metformin and related, and Zoster vac recomb adjuvanted    FAMILY HISTORY       Family History   Problem Relation Age of Onset    High Blood Pressure Mother           SOCIAL HISTORY       Social History     Socioeconomic History    Marital status: Single     Spouse name: None    Number of children: 2    Years of education: None    Highest education level: None   Occupational History    None   Tobacco Use    Smoking status: Never Smoker    Smokeless tobacco: Never Used   Substance and Sexual Activity    Alcohol use: No    Drug use: No    Sexual activity: Not Currently   Other Topics Concern    None   Social History Narrative    Has 2 sons. One of her sons lives with her in a mobile home. Social Determinants of Health     Financial Resource Strain: Low Risk     Difficulty of Paying Living Expenses: Not hard at all   Food Insecurity: No Food Insecurity    Worried About Running Out of Food in the Last Year: Never true    Genevieve of Food in the Last Year: Never true   Transportation Needs:     Lack of Transportation (Medical): Not on file    Lack of Transportation (Non-Medical): Not on file   Physical Activity:     Days of Exercise per Week: Not on file    Minutes of Exercise per Session: Not on file   Stress:     Feeling of Stress : Not on file   Social Connections:     Frequency of Communication with Friends and Family: Not on file    Frequency of Social Gatherings with Friends and Family: Not on file    Attends Mor[employer] Services: Not on file    Active Member of [address] [place] [address] [employer] or Organizations: Not on file    Attends Club or Organization Meetings: Not on file    Marital Status: Not on file   Intimate Partner Violence:     Fear of Current or Ex-Partner: Not on file    Emotionally Abused: Not on file    Physically Abused: Not on file    Sexually Abused: Not on file   Housing Stability:     Unable to Pay for Housing in the Last Year: Not on file    Number of [place] in the Last Year: Not on file    Unstable Housing in the Last Year: Not on file         PHYSICAL EXAM        ED Triage Vitals [04/11/22 0056]   BP Temp Temp Source Pulse Resp SpO2 Height Weight   (!) 233/89 97.8 °F (36.6 °C) Oral 57 18 99 % -- 110 lb (49.9 kg)       Physical Exam  Constitutional:       General: She is not in acute distress. Appearance: Normal appearance. She is not ill-appearing, toxic-appearing or diaphoretic. HENT:      Head: Normocephalic and atraumatic.       Right Ear: Tympanic membrane, ear canal and external ear normal.      Left Ear: Ear canal and external ear normal.      Nose: Nose normal. No congestion or rhinorrhea. Mouth/Throat:      Mouth: Mucous membranes are moist.      Pharynx: Oropharynx is clear. No oropharyngeal exudate or posterior oropharyngeal erythema. Eyes:      Extraocular Movements: Extraocular movements intact. Conjunctiva/sclera: Conjunctivae normal.      Pupils: Pupils are equal, round, and reactive to light. Cardiovascular:      Rate and Rhythm: Normal rate and regular rhythm. Pulses: Normal pulses. Pulmonary:      Effort: Pulmonary effort is normal. No respiratory distress. Breath sounds: Normal breath sounds. Abdominal:      General: Bowel sounds are normal. There is no distension. Palpations: Abdomen is soft. Tenderness: There is no abdominal tenderness. There is no guarding. Musculoskeletal:         General: No swelling or tenderness. Normal range of motion. Cervical back: Normal range of motion. No rigidity or tenderness. Skin:     General: Skin is warm. Capillary Refill: Capillary refill takes less than 2 seconds. Neurological:      Mental Status: She is alert and oriented to person, place, and time. Mental status is at baseline. Cranial Nerves: No cranial nerve deficit. Sensory: No sensory deficit. Motor: No weakness. Coordination: Coordination normal.   Psychiatric:         Mood and Affect: Mood normal.         Behavior: Behavior normal.         Thought Content:  Thought content normal.         Judgment: Judgment normal.           LABS:  Labs Reviewed   COMPREHENSIVE METABOLIC PANEL - Abnormal; Notable for the following components:       Result Value    Glucose 179 (*)     BUN 25 (*)     CREATININE 1.16 (*)     GFR Non- 44.9 (*)     GFR  54.3 (*)     Total Protein 8.2 (*)     Globulin 4.0 (*)     All other components within normal limits   CBC WITH AUTO DIFFERENTIAL - Abnormal; Notable for the following components:    RBC 3.41 (*) Hemoglobin 10.9 (*)     Hematocrit 32.0 (*)     MCH 31.9 (*)     Lymphocytes Absolute 0.7 (*)     All other components within normal limits   TSH WITH REFLEX - Abnormal; Notable for the following components:    TSH 10.190 (*)     All other components within normal limits   TROPONIN   T4, FREE     EKG sinus bradycardia HR 51 regular intervals no axis deviation no acute ST changes    MDM:   Vitals:    Vitals:    04/11/22 0330 04/11/22 0400 04/11/22 0415 04/11/22 0445   BP: (!) 233/83 (!) 233/90 (!) 213/86 (!) 154/87   Pulse: 57 60 56 56   Resp: 14 16 15 13   Temp:       TempSrc:       SpO2:  97% 98% 96%   Weight:           67-year-old female patient presents emergency department with hypertension. Patient reporting some intermittent dizziness associated today, however on further discussion she admits she has been having dizziness for the past 8 months. She is afebrile, 233/89 on arrival.  She does have multiple antihypertensive allergies, notes she has responded well in the past p.o. losartan this is given. Minimal improvement. She is given p.o. clonidine. Still minimal improvement. At this time she is reporting she is not having any dizziness, she is not having any symptoms. Slight TIN today on laboratory studies. Otherwise no acute EKG changes, troponin is negative. CT head is negative. Discussed with ED attending Dr. Victorino Collet with recommendation for Isordil which is given. Repeat BP demonstrates 154/87 . Dr Victorino Collet recommending patient is stable for discharge with increase of her home Imdur to 90 mg daily. Discussed this medication change with patient. She will contact her cardiologist for close followup. At this time she states she is asymptomatic and feels well. Discussed with him signs and symptoms which to follow-up as well specific timeline for which follow-up. She demonstrates understanding of plan. Talk to text utilized for entire encounter due to patient's hearing impairment.        CRITICAL CARE TIME   Total CriticalCare time was 0 minutes, excluding separately reportable procedures. There was a high probability of clinically significant/life threatening deterioration in the patient's condition which required my urgent intervention. PROCEDURES:  Unlessotherwise noted below, none      Procedures      FINAL IMPRESSION      1.  Hypertensive urgency          DISPOSITION/PLAN   DISPOSITION Decision To Discharge 04/11/2022 04:47:38 AM          RUPESH Moody (electronically signed)  Attending Emergency Physician          Lauren Marroquin, [address]  04/13/22 0708

## 2022-04-11 NOTE — ED NOTES
Patient discharged to [facility], no call back from son, will attempt to call again     Jonah Dey RN  04/11/22 3021

## 2022-04-11 NOTE — TELEPHONE ENCOUNTER
Please approve or deny this refill request. The order is pended. Thank you.     LOV 3/29/2022    Next Visit Date:  Future Appointments   Date Time Provider Junaid Light   4/18/2022  1:40 PM MD [address]   4/19/2022  3:00 PM Neno Cancino [facility] EMERGENCY [facility] AT [place]   4/28/2022  2:00 PM Lindy Flaherty MD [address] [place] [address]   5/5/2022  9:00 AM [address] [place] [address] MD [address]         Verbal order received with read back

## 2022-04-18 ENCOUNTER — HOSPITAL ENCOUNTER (OUTPATIENT)
Dept: LAB | Age: 82
Discharge: HOME | End: 2022-04-18
Payer: MEDICARE

## 2022-04-18 ENCOUNTER — OFFICE VISIT (OUTPATIENT)
Dept: FAMILY MEDICINE CLINIC | Age: 82
End: 2022-04-18
Payer: MEDICARE

## 2022-04-18 VITALS
OXYGEN SATURATION: 97 % | WEIGHT: 108.6 LBS | TEMPERATURE: 97.8 F | HEART RATE: 60 BPM | DIASTOLIC BLOOD PRESSURE: 80 MMHG | BODY MASS INDEX: 18.64 KG/M2 | SYSTOLIC BLOOD PRESSURE: 146 MMHG

## 2022-04-18 DIAGNOSIS — E03.9 HYPOTHYROIDISM, UNSPECIFIED TYPE: ICD-10-CM

## 2022-04-18 DIAGNOSIS — D64.9 ANEMIA, UNSPECIFIED TYPE: ICD-10-CM

## 2022-04-18 DIAGNOSIS — I16.0 HYPERTENSIVE URGENCY: ICD-10-CM

## 2022-04-18 DIAGNOSIS — I63.9 CEREBROVASCULAR ACCIDENT (CVA), UNSPECIFIED MECHANISM (HCC): Primary | ICD-10-CM

## 2022-04-18 DIAGNOSIS — K80.20 CALCULUS OF GALLBLADDER WITHOUT CHOLECYSTITIS WITHOUT OBSTRUCTION: ICD-10-CM

## 2022-04-18 PROCEDURE — 86376 MICROSOMAL ANTIBODY EACH: CPT

## 2022-04-18 PROCEDURE — 84443 ASSAY THYROID STIM HORMONE: CPT

## 2022-04-18 PROCEDURE — 85025 COMPLETE CBC W/AUTO DIFF WBC: CPT

## 2022-04-18 PROCEDURE — 99214 OFFICE O/P EST MOD 30 MIN: CPT | Performed by: FAMILY MEDICINE

## 2022-04-18 PROCEDURE — 36415 COLL VENOUS BLD VENIPUNCTURE: CPT

## 2022-04-18 PROCEDURE — 84439 ASSAY OF FREE THYROXINE: CPT

## 2022-04-18 RX ORDER — CARVEDILOL 25 MG/1
25 TABLET ORAL 2 TIMES DAILY
Qty: 60 TABLET | Refills: 3 | Status: CANCELLED | OUTPATIENT
Start: 2022-04-18

## 2022-04-18 NOTE — PROGRESS NOTES
Subjective:      Patient ID: Ryley Fink is a [de-identified] y.o. female who presents today for:     Chief Complaint   Patient presents with    Follow-up     pt states she was evaluated in ED for HTN       HPI  Patient is a very pleasant 56-year-old female presents today with her son after recent ER visit for accelerated hypertension. Symptoms have improved since then and patient is overall doing well she denies any chest discomfort, shortness of breath and lower extremity edema. She has followed up with cardiology who has titrated her medication and is now on clonidine 0.2 mg 3 times a day. Upon review, patient has had persistent anemia and does mention some mild fatigue. She has a history of hypothyroidism that has historically been well controlled. There is also an incidental finding of cholelithiasis. Patient denies any abdominal pain, nausea, vomiting, jaundice or melena. Past Medical History:   Diagnosis Date    Allergic rhinitis     Asthma     uses flovent    Hearing loss     Hyperlipidemia     Hypertension     Hypothyroidism     Lichen planus     Dr. Eleonora Eaton     Past Surgical History:   Procedure Laterality Date    HYSTERECTOMY, TOTAL ABDOMINAL      TONSILLECTOMY       Family History   Problem Relation Age of Onset    High Blood Pressure Mother      Social History     Socioeconomic History    Marital status: Single     Spouse name: Not on file    Number of children: 2    Years of education: Not on file    Highest education level: Not on file   Occupational History    Not on file   Tobacco Use    Smoking status: Never Smoker    Smokeless tobacco: Never Used   Substance and Sexual Activity    Alcohol use: No    Drug use: No    Sexual activity: Not Currently   Other Topics Concern    Not on file   Social History Narrative    Has 2 sons. One of her sons lives with her in a mobile home.      Social Determinants of Health     Financial Resource Strain: Low Risk     Difficulty of Paying Living Expenses: Not hard at all   Food Insecurity: No Food Insecurity    Worried About [facility] in the Last Year: Never true    Genevieve of Food in the Last Year: Never true   Transportation Needs:     Lack of Transportation (Medical): Not on file    Lack of Transportation (Non-Medical):  Not on file   Physical Activity:     Days of Exercise per Week: Not on file    Minutes of Exercise per Session: Not on file   Stress:     Feeling of Stress : Not on file   Social Connections:     Frequency of Communication with Friends and Family: Not on file    Frequency of Social Gatherings with Friends and Family: Not on file    Attends Evangelical Services: Not on file    Active Member of [address] [place] [address] or Organizations: Not on file    Attends Club or Organization Meetings: Not on file    Marital Status: Not on file   Intimate Partner Violence:     Fear of Current or Ex-Partner: Not on file    Emotionally Abused: Not on file    Physically Abused: Not on file    Sexually Abused: Not on file   Housing Stability:     Unable to Pay for Housing in the Last Year: Not on file    Number of [place] in the Last Year: Not on file    Unstable Housing in the Last Year: Not on file     Current Outpatient Medications on File Prior to Visit   Medication Sig Dispense Refill    lisinopril (PRINIVIL;ZESTRIL) 40 MG tablet Take 1 tablet by mouth daily 90 tablet 2    cloNIDine (CATAPRES) 0.2 MG tablet Take 1 tablet by mouth 3 times daily 270 tablet 3    isosorbide mononitrate (IMDUR) 60 MG extended release tablet Take 1 tablet by mouth daily 90 tablet 3    carvedilol (COREG) 25 MG tablet Take 1 tablet by mouth 2 times daily 60 tablet 3    levothyroxine (SYNTHROID) 75 MCG tablet Take 1 tablet by mouth Daily 30 tablet 1    atorvastatin (LIPITOR) 20 MG tablet Take 1 tablet by mouth daily 30 tablet 1    aspirin 81 MG chewable tablet Take 1 tablet by mouth daily 30 tablet 3    Artificial Tear Solution (TEARS AGAIN ADVANCED EYELID) SOLN Apply to eye      FLOVENT HFA 44 MCG/ACT inhaler INHALE 1 PUFF BY MOUTH TWICE DAILY FOR 90 DAYS. No current facility-administered medications on file prior to visit. Allergies:  Albuterol, Amlodipine, Hydralazine, Metformin and related, and Zoster vac recomb adjuvanted    Review of Systems   Constitutional: Negative for activity change, appetite change, fatigue, fever and unexpected weight change. Respiratory: Negative for apnea, cough, chest tightness and shortness of breath. Cardiovascular: Negative for chest pain, palpitations and leg swelling. Gastrointestinal: Negative for abdominal pain, blood in stool, constipation, diarrhea, nausea and vomiting. Musculoskeletal: Negative for arthralgias. Neurological: Negative for seizures and headaches. Psychiatric/Behavioral: Negative for hallucinations and suicidal ideas. Objective:   BP (!) 146/80   Pulse 60   Temp 97.8 °F (36.6 °C) (Infrared)   Wt 108 lb 9.6 oz (49.3 kg)   SpO2 97%   BMI 18.64 kg/m²     Physical Exam  Vitals and nursing note reviewed. Constitutional:       General: She is not in acute distress. Appearance: Normal appearance. She is well-developed. She is not diaphoretic. HENT:      Head: Normocephalic and atraumatic. Nose: Nose normal.      Mouth/Throat:      Mouth: Mucous membranes are moist.      Pharynx: Oropharynx is clear. Eyes:      Conjunctiva/sclera: Conjunctivae normal.      Pupils: Pupils are equal, round, and reactive to light. Cardiovascular:      Rate and Rhythm: Normal rate and regular rhythm. Heart sounds: Normal heart sounds. No murmur heard. No friction rub. No gallop. Pulmonary:      Effort: Pulmonary effort is normal. No respiratory distress. Breath sounds: Normal breath sounds. No wheezing or rales. Chest:      Chest wall: No tenderness. Abdominal:      General: Abdomen is flat. Bowel sounds are normal.      Palpations: Abdomen is soft. Tenderness: There is no abdominal tenderness. Musculoskeletal:      Cervical back: Normal range of motion. Skin:     General: Skin is warm and dry. Neurological:      Mental Status: She is alert and oriented to person, place, and time. Psychiatric:         Behavior: Behavior normal.         Thought Content: Thought content normal.         Judgment: Judgment normal.         Assessment & Plan:     1. Cerebrovascular accident (CVA), unspecified mechanism ([address]  We will place a referral so patient is able to reestablish with neurology  - Amb External Referral To Neurology    2. Hypertensive urgency  Blood pressure is improved today. Given patient's history will not make any changes to current medication as blood pressure is acceptable compared to patient's normal elevated levels    3. Calculus of gallbladder without cholecystitis without obstruction  Incidental finding: Patient is asymptomatic and will refer to general surgery  - Ambulatory referral to General Surgery    4. Hypothyroidism, unspecified type  We will check current thyroid function and titrate medication accordingly  - TSH with Reflex; Future  - T4, Free; Future  - Thyroid Peroxidase Antibody; Future    5. Anemia, unspecified type  Will check CBC for further investigation  - CBC with Auto Differential; Future      Return in about 3 months (around 7/18/2022), or if symptoms worsen or fail to improve, for AWV.     Lindbergh Spurling, MD

## 2022-04-19 LAB
BASOPHILS ABSOLUTE: 0 K/UL (ref 0–0.2)
BASOPHILS RELATIVE PERCENT: 0.6 %
EOSINOPHILS ABSOLUTE: 0.1 K/UL (ref 0–0.7)
EOSINOPHILS RELATIVE PERCENT: 1.7 %
HCT VFR BLD CALC: 38.2 % (ref 37–47)
HEMOGLOBIN: 12.3 G/DL (ref 12–16)
LYMPHOCYTES ABSOLUTE: 1 K/UL (ref 1–4.8)
LYMPHOCYTES RELATIVE PERCENT: 21.2 %
MCH RBC QN AUTO: 31.5 PG (ref 27–31.3)
MCHC RBC AUTO-ENTMCNC: 32.4 % (ref 33–37)
MCV RBC AUTO: 97.4 FL (ref 82–100)
MONOCYTES ABSOLUTE: 0.3 K/UL (ref 0.2–0.8)
MONOCYTES RELATIVE PERCENT: 7 %
NEUTROPHILS ABSOLUTE: 3.1 K/UL (ref 1.4–6.5)
NEUTROPHILS RELATIVE PERCENT: 69.5 %
PDW BLD-RTO: 13.6 % (ref 11.5–14.5)
PLATELET # BLD: 193 K/UL (ref 130–400)
RBC # BLD: 3.92 M/UL (ref 4.2–5.4)
T4 FREE: 1.66 NG/DL (ref 0.84–1.68)
TSH REFLEX: 9.28 UIU/ML (ref 0.44–3.86)
WBC # BLD: 4.5 K/UL (ref 4.8–10.8)

## 2022-04-20 LAB — THYROID PEROXIDASE (TPO) ABS: 7.2 IU/ML (ref 0–25)

## 2022-04-21 DIAGNOSIS — E03.9 HYPOTHYROIDISM, UNSPECIFIED TYPE: Primary | ICD-10-CM

## 2022-04-21 DIAGNOSIS — D72.819 LEUKOPENIA, UNSPECIFIED TYPE: ICD-10-CM

## 2022-05-01 ASSESSMENT — ENCOUNTER SYMPTOMS
DIARRHEA: 0
COUGH: 0
ABDOMINAL PAIN: 0
CONSTIPATION: 0
APNEA: 0
VOMITING: 0
CHEST TIGHTNESS: 0
BLOOD IN STOOL: 0
SHORTNESS OF BREATH: 0
NAUSEA: 0

## 2022-05-03 ENCOUNTER — COMMUNITY OUTREACH (OUTPATIENT)
Dept: INTERNAL MEDICINE | Age: 82
End: 2022-05-03

## 2022-05-03 NOTE — PROGRESS NOTES
Patient's HM shows they are overdue for AWV. Britton Coley Note added to upcoming appointment that AWV is due.

## 2022-05-05 ENCOUNTER — OFFICE VISIT (OUTPATIENT)
Dept: FAMILY MEDICINE CLINIC | Age: 82
End: 2022-05-05
Payer: MEDICARE

## 2022-05-05 VITALS
HEART RATE: 75 BPM | TEMPERATURE: 97.5 F | BODY MASS INDEX: 18.85 KG/M2 | WEIGHT: 109.8 LBS | DIASTOLIC BLOOD PRESSURE: 84 MMHG | OXYGEN SATURATION: 95 % | SYSTOLIC BLOOD PRESSURE: 138 MMHG

## 2022-05-05 DIAGNOSIS — N17.9 AKI (ACUTE KIDNEY INJURY) (HCC): ICD-10-CM

## 2022-05-05 DIAGNOSIS — E03.9 HYPOTHYROIDISM, UNSPECIFIED TYPE: ICD-10-CM

## 2022-05-05 DIAGNOSIS — I10 HTN (HYPERTENSION), MALIGNANT: Primary | ICD-10-CM

## 2022-05-05 PROCEDURE — 99214 OFFICE O/P EST MOD 30 MIN: CPT | Performed by: FAMILY MEDICINE

## 2022-05-05 RX ORDER — FLUTICASONE PROPIONATE 44 MCG
AEROSOL WITH ADAPTER (GRAM) INHALATION
Qty: 1 EACH | Refills: 0 | Status: SHIPPED | OUTPATIENT
Start: 2022-05-05 | End: 2022-10-24 | Stop reason: SDUPTHER

## 2022-05-05 NOTE — PROGRESS NOTES
Subjective:      Patient ID: Susan Salinas is a [de-identified] y.o. female who presents today for:     Chief Complaint   Patient presents with    ED Follow-up    Hypertension     would like to know if its safe for covid booster        HPI  Patient is a very pleasant 73-year-old female presents today to follow-up on hypertension. She states that blood pressure has been more stable since last encounter. She denies any chest discomfort, shortness of breath or lower extremity edema. She is in the care of cardiology who is assisting in managing blood pressure. Otherwise she feels well and wants to know if it is safe to obtain the COVID-19 booster. Last labs showed decline in kidney function after medication changes. Patient denies any flank pain, dysuria, hematuria or discharge. She does admit to not drinking enough water    Past Medical History:   Diagnosis Date    Allergic rhinitis     Asthma     uses flovent    Hearing loss     Hyperlipidemia     Hypertension     Hypothyroidism     Lichen planus     Dr. Vince Michaels     Past Surgical History:   Procedure Laterality Date    HYSTERECTOMY, TOTAL ABDOMINAL      TONSILLECTOMY       Family History   Problem Relation Age of Onset    High Blood Pressure Mother      Social History     Socioeconomic History    Marital status: Single     Spouse name: Not on file    Number of children: 2    Years of education: Not on file    Highest education level: Not on file   Occupational History    Not on file   Tobacco Use    Smoking status: Never Smoker    Smokeless tobacco: Never Used   Substance and Sexual Activity    Alcohol use: No    Drug use: No    Sexual activity: Not Currently   Other Topics Concern    Not on file   Social History Narrative    Has 2 sons. One of her sons lives with her in a mobile home.      Social Determinants of Health     Financial Resource Strain: Low Risk     Difficulty of Paying Living Expenses: Not hard at all   Food Insecurity: No Food Insecurity    Worried About Running Out of Food in the Last Year: Never true    Genevieve of Food in the Last Year: Never true   Transportation Needs:     Lack of Transportation (Medical): Not on file    Lack of Transportation (Non-Medical):  Not on file   Physical Activity:     Days of Exercise per Week: Not on file    Minutes of Exercise per Session: Not on file   Stress:     Feeling of Stress : Not on file   Social Connections:     Frequency of Communication with Friends and Family: Not on file    Frequency of Social Gatherings with Friends and Family: Not on file    Attends Yazidism Services: Not on file    Active Member of [address] [place] [address] or Organizations: Not on file    Attends Club or Organization Meetings: Not on file    Marital Status: Not on file   Intimate Partner Violence:     Fear of Current or Ex-Partner: Not on file    Emotionally Abused: Not on file    Physically Abused: Not on file    Sexually Abused: Not on file   Housing Stability:     Unable to Pay for Housing in the Last Year: Not on file    Number of [place] in the Last Year: Not on file    Unstable Housing in the Last Year: Not on file     Current Outpatient Medications on File Prior to Visit   Medication Sig Dispense Refill    lisinopril (PRINIVIL;ZESTRIL) 40 MG tablet Take 1 tablet by mouth daily 90 tablet 2    cloNIDine (CATAPRES) 0.2 MG tablet Take 1 tablet by mouth 3 times daily 270 tablet 3    isosorbide mononitrate (IMDUR) 60 MG extended release tablet Take 1 tablet by mouth daily 90 tablet 3    carvedilol (COREG) 25 MG tablet Take 1 tablet by mouth 2 times daily 60 tablet 3    levothyroxine (SYNTHROID) 75 MCG tablet Take 1 tablet by mouth Daily 30 tablet 1    atorvastatin (LIPITOR) 20 MG tablet Take 1 tablet by mouth daily 30 tablet 1    aspirin 81 MG chewable tablet Take 1 tablet by mouth daily 30 tablet 3    Artificial Tear Solution (TEARS AGAIN ADVANCED EYELID) SOLN Apply to eye       No current facility-administered medications on file prior to visit. Allergies:  Albuterol, Amlodipine, Hydralazine, Metformin and related, and Zoster vac recomb adjuvanted    Review of Systems   Constitutional: Negative for activity change, appetite change and fatigue. Respiratory: Negative for apnea, cough, chest tightness and shortness of breath. Cardiovascular: Negative for chest pain, palpitations and leg swelling. Gastrointestinal: Negative for abdominal pain, blood in stool, constipation, diarrhea, nausea and vomiting. Musculoskeletal: Negative for arthralgias. Neurological: Negative for seizures and headaches. Psychiatric/Behavioral: Negative for hallucinations and suicidal ideas. Objective:   /84   Pulse 75   Temp 97.5 °F (36.4 °C) (Infrared)   Wt 109 lb 12.8 oz (49.8 kg)   SpO2 95%   BMI 18.85 kg/m²     Physical Exam  Vitals and nursing note reviewed. Constitutional:       General: She is not in acute distress. Appearance: Normal appearance. She is well-developed. She is not diaphoretic. HENT:      Head: Normocephalic and atraumatic. Nose: Nose normal.      Mouth/Throat:      Mouth: Mucous membranes are moist.      Pharynx: Oropharynx is clear. Eyes:      Conjunctiva/sclera: Conjunctivae normal.      Pupils: Pupils are equal, round, and reactive to light. Cardiovascular:      Rate and Rhythm: Normal rate and regular rhythm. Heart sounds: Normal heart sounds. No murmur heard. No friction rub. No gallop. Pulmonary:      Effort: Pulmonary effort is normal. No respiratory distress. Breath sounds: Normal breath sounds. No wheezing or rales. Chest:      Chest wall: No tenderness. Abdominal:      General: Abdomen is flat. Bowel sounds are normal.      Palpations: Abdomen is soft. Tenderness: There is no abdominal tenderness. Musculoskeletal:      Cervical back: Normal range of motion. Skin:     General: Skin is warm and dry.    Neurological: Mental Status: She is alert and oriented to person, place, and time. Psychiatric:         Behavior: Behavior normal.         Thought Content: Thought content normal.         Judgment: Judgment normal.         Assessment & Plan:     1. HTN (hypertension), malignant  Improved: Continue current medication    2. Hypothyroidism, unspecified type  Stable: Free T4 is at the upper limit of normal therefore will not increase medication at this time as it may exacerbate hypertension and heart rate  We will recheck TSH    3. TIN (acute kidney injury) ([facility] [address]  Recheck BMP. Encourage increased hydration  - Basic Metabolic Panel; Future      Return in about 3 months (around 8/5/2022) for AWV.     Rupa Marvin MD

## 2022-05-13 ASSESSMENT — ENCOUNTER SYMPTOMS
VOMITING: 0
CONSTIPATION: 0
SHORTNESS OF BREATH: 0
COUGH: 0
DIARRHEA: 0
NAUSEA: 0
BLOOD IN STOOL: 0
APNEA: 0
ABDOMINAL PAIN: 0
CHEST TIGHTNESS: 0

## 2022-05-20 RX ORDER — ATORVASTATIN CALCIUM 20 MG/1
TABLET, FILM COATED ORAL
Qty: 30 TABLET | Refills: 1 | OUTPATIENT
Start: 2022-05-20

## 2022-05-23 RX ORDER — ATORVASTATIN CALCIUM 20 MG/1
20 TABLET, FILM COATED ORAL DAILY
Qty: 90 TABLET | Refills: 0 | Status: SHIPPED | OUTPATIENT
Start: 2022-05-23 | End: 2022-08-19

## 2022-06-03 ENCOUNTER — OFFICE VISIT (OUTPATIENT)
Dept: CARDIOLOGY CLINIC | Age: 82
End: 2022-06-03
Payer: MEDICARE

## 2022-06-03 VITALS
HEART RATE: 83 BPM | SYSTOLIC BLOOD PRESSURE: 186 MMHG | DIASTOLIC BLOOD PRESSURE: 82 MMHG | BODY MASS INDEX: 18.78 KG/M2 | OXYGEN SATURATION: 94 % | HEIGHT: 64 IN | WEIGHT: 110 LBS

## 2022-06-03 DIAGNOSIS — D72.819 LEUKOPENIA, UNSPECIFIED TYPE: ICD-10-CM

## 2022-06-03 DIAGNOSIS — I16.0 HYPERTENSIVE URGENCY: Primary | ICD-10-CM

## 2022-06-03 DIAGNOSIS — E78.5 DYSLIPIDEMIA: ICD-10-CM

## 2022-06-03 DIAGNOSIS — E03.9 HYPOTHYROIDISM, UNSPECIFIED TYPE: ICD-10-CM

## 2022-06-03 DIAGNOSIS — N17.9 AKI (ACUTE KIDNEY INJURY) (HCC): ICD-10-CM

## 2022-06-03 LAB
ANION GAP SERPL CALCULATED.3IONS-SCNC: 16 MEQ/L (ref 9–15)
BASOPHILS ABSOLUTE: 0 K/UL (ref 0–0.2)
BASOPHILS RELATIVE PERCENT: 0.7 %
BUN BLDV-MCNC: 25 MG/DL (ref 8–23)
CALCIUM SERPL-MCNC: 9.2 MG/DL (ref 8.5–9.9)
CHLORIDE BLD-SCNC: 104 MEQ/L (ref 95–107)
CO2: 19 MEQ/L (ref 20–31)
CREAT SERPL-MCNC: 1.11 MG/DL (ref 0.5–0.9)
EOSINOPHILS ABSOLUTE: 0 K/UL (ref 0–0.7)
EOSINOPHILS RELATIVE PERCENT: 0.8 %
GFR AFRICAN AMERICAN: 57.2
GFR NON-AFRICAN AMERICAN: 47.2
GLUCOSE BLD-MCNC: 97 MG/DL (ref 70–99)
HCT VFR BLD CALC: 31 % (ref 37–47)
HEMOGLOBIN: 10.9 G/DL (ref 12–16)
LYMPHOCYTES ABSOLUTE: 0.9 K/UL (ref 1–4.8)
LYMPHOCYTES RELATIVE PERCENT: 19.3 %
MCH RBC QN AUTO: 32.6 PG (ref 27–31.3)
MCHC RBC AUTO-ENTMCNC: 35 % (ref 33–37)
MCV RBC AUTO: 93.1 FL (ref 82–100)
MONOCYTES ABSOLUTE: 0.4 K/UL (ref 0.2–0.8)
MONOCYTES RELATIVE PERCENT: 8.4 %
NEUTROPHILS ABSOLUTE: 3.2 K/UL (ref 1.4–6.5)
NEUTROPHILS RELATIVE PERCENT: 70.8 %
PDW BLD-RTO: 13.1 % (ref 11.5–14.5)
PLATELET # BLD: 198 K/UL (ref 130–400)
POTASSIUM SERPL-SCNC: 4.6 MEQ/L (ref 3.4–4.9)
RBC # BLD: 3.33 M/UL (ref 4.2–5.4)
SODIUM BLD-SCNC: 139 MEQ/L (ref 135–144)
T4 FREE: 1.02 NG/DL (ref 0.84–1.68)
TSH REFLEX: 14.5 UIU/ML (ref 0.44–3.86)
WBC # BLD: 4.5 K/UL (ref 4.8–10.8)

## 2022-06-03 PROCEDURE — 1123F ACP DISCUSS/DSCN MKR DOCD: CPT | Performed by: INTERNAL MEDICINE

## 2022-06-03 PROCEDURE — 99215 OFFICE O/P EST HI 40 MIN: CPT | Performed by: INTERNAL MEDICINE

## 2022-06-03 RX ORDER — CARVEDILOL 25 MG/1
25 TABLET ORAL 2 TIMES DAILY
Qty: 180 TABLET | Refills: 3 | Status: SHIPPED | OUTPATIENT
Start: 2022-06-03

## 2022-06-03 RX ORDER — ISOSORBIDE MONONITRATE 60 MG/1
60 TABLET, EXTENDED RELEASE ORAL DAILY
Qty: 90 TABLET | Refills: 3 | Status: SHIPPED | OUTPATIENT
Start: 2022-06-03

## 2022-06-03 RX ORDER — LISINOPRIL 40 MG/1
40 TABLET ORAL DAILY
Qty: 90 TABLET | Refills: 2 | Status: SHIPPED | OUTPATIENT
Start: 2022-06-03 | End: 2022-10-24 | Stop reason: SDUPTHER

## 2022-06-03 ASSESSMENT — ENCOUNTER SYMPTOMS
STRIDOR: 0
BLOOD IN STOOL: 0
NAUSEA: 0
RESPIRATORY NEGATIVE: 1
COUGH: 0
WHEEZING: 0
EYES NEGATIVE: 1
CHEST TIGHTNESS: 0
GASTROINTESTINAL NEGATIVE: 1
SHORTNESS OF BREATH: 0

## 2022-06-03 NOTE — PROGRESS NOTES
OFFICE VISIT        Patient: Hue Alegria  YOB: 1940  MRN: [id number]    Chief Complaint: HTN   Chief Complaint   Patient presents with    Follow-up    Medication Check     clonidine she is unable to take    Hypertension     carvedilol makes her dizzy. CV Data:  11/21 MAIK EF 60%   3/22 Renal Duplex negative ARIADNA. Subjective/HPI in ER yesterday for HTN. she is compliant with meds. But still elevated. CT Chest noted. Renal duplex no ariadna. Pt has no cp no sob. She wants to go back exercising. 6/3/22 does not feel well. BP elevated. Not compliant with meds due to being tired. She stopped Clonindine on her own. She is only taking coreg qd.     nonsmoker  No etoh  Lives with son. EKG:    Past Medical History:   Diagnosis Date    Allergic rhinitis     Asthma     uses flovent    Hearing loss     Hyperlipidemia     Hypertension     Hypothyroidism     Lichen planus     Dr. Kamari Delarosa       Past Surgical History:   Procedure Laterality Date    HYSTERECTOMY, TOTAL ABDOMINAL      TONSILLECTOMY         Family History   Problem Relation Age of Onset    High Blood Pressure Mother        Social History     Socioeconomic History    Marital status: Single     Spouse name: None    Number of children: 2    Years of education: None    Highest education level: None   Occupational History    None   Tobacco Use    Smoking status: Never Smoker    Smokeless tobacco: Never Used   Substance and Sexual Activity    Alcohol use: No    Drug use: No    Sexual activity: Not Currently   Other Topics Concern    None   Social History Narrative    Has 2 sons. One of her sons lives with her in a mobile home.      Social Determinants of Health     Financial Resource Strain: Low Risk     Difficulty of Paying Living Expenses: Not hard at all   Food Insecurity: No Food Insecurity    Worried About Running Out of Food in the Last Year: Never true    Genevieve of Food in the Last Year: Never true Transportation Needs:     Lack of Transportation (Medical): Not on file    Lack of Transportation (Non-Medical): Not on file   Physical Activity:     Days of Exercise per Week: Not on file    Minutes of Exercise per Session: Not on file   Stress:     Feeling of Stress : Not on file   Social Connections:     Frequency of Communication with Friends and Family: Not on file    Frequency of Social Gatherings with Friends and Family: Not on file    Attends Rastafari Services: Not on file    Active Member of [address] [place] [address] or Organizations: Not on file    Attends Club or Organization Meetings: Not on file    Marital Status: Not on file   Intimate Partner Violence:     Fear of Current or Ex-Partner: Not on file    Emotionally Abused: Not on file    Physically Abused: Not on file    Sexually Abused: Not on file   Housing Stability:     Unable to Pay for Housing in the Last Year: Not on file    Number of [place] in the Last Year: Not on file    Unstable Housing in the Last Year: Not on file       Allergies   Allergen Reactions    Albuterol     Amlodipine     Hydralazine Itching    Metformin And Related     Zoster Vac Recomb Adjuvanted Itching     Shingles Vaccine        Current Outpatient Medications   Medication Sig Dispense Refill    carvedilol (COREG) 25 MG tablet Take 1 tablet by mouth 2 times daily 180 tablet 3    isosorbide mononitrate (IMDUR) 60 MG extended release tablet Take 1 tablet by mouth daily 90 tablet 3    lisinopril (PRINIVIL;ZESTRIL) 40 MG tablet Take 1 tablet by mouth daily 90 tablet 2    Artificial Tear Solution (TEARS AGAIN ADVANCED EYELID) SOLN Apply to eye      atorvastatin (LIPITOR) 20 MG tablet Take 1 tablet by mouth daily 90 tablet 0    FLOVENT HFA 44 MCG/ACT inhaler INHALE 1 PUFF BY MOUTH TWICE DAILY FOR 90 DAYS.  1 each 0    levothyroxine (SYNTHROID) 75 MCG tablet Take 1 tablet by mouth Daily 30 tablet 1    aspirin 81 MG chewable tablet Take 1 tablet by mouth daily 30 tablet 3     No current facility-administered medications for this visit. Review of Systems:   Review of Systems   Constitutional: Negative. Negative for diaphoresis and fatigue. HENT: Negative. Eyes: Negative. Respiratory: Negative. Negative for cough, chest tightness, shortness of breath, wheezing and stridor. Cardiovascular: Negative. Negative for chest pain, palpitations and leg swelling. Gastrointestinal: Negative. Negative for blood in stool and nausea. Genitourinary: Negative. Musculoskeletal: Negative. Skin: Negative. Neurological: Negative. Negative for dizziness, syncope, weakness and light-headedness. Hematological: Negative. Psychiatric/Behavioral: Negative. Physical Examination:    BP (!) 186/82 (Site: Right Upper Arm, Position: Sitting, Cuff Size: Medium Adult)   Pulse 83   Ht 5' 4\" (1.626 m)   Wt 110 lb (49.9 kg)   SpO2 94%   BMI 18.88 kg/m²    Physical Exam   Constitutional: She appears healthy. No distress. HENT:   Normal cephalic and Atraumatic   Eyes: Pupils are equal, round, and reactive to light. Neck: Thyroid normal. No JVD present. No neck adenopathy. No thyromegaly present. Cardiovascular: Normal rate, regular rhythm, normal heart sounds, intact distal pulses and normal pulses. Pulmonary/Chest: Effort normal and breath sounds normal. She has no wheezes. She has no rales. She exhibits no tenderness. Abdominal: Soft. Bowel sounds are normal. There is no abdominal tenderness. Musculoskeletal:         General: No tenderness or edema. Normal range of motion. Cervical back: Normal range of motion and neck supple. Neurological: She is alert and oriented to person, place, and time. Skin: Skin is warm. No cyanosis. Nails show no clubbing.        LABS:  CBC:   Lab Results   Component Value Date    WBC 4.5 04/18/2022    RBC 3.92 04/18/2022    HGB 12.3 04/18/2022    HCT 38.2 04/18/2022    MCV 97.4 04/18/2022    MCH 31.5 04/18/2022 MCHC 32.4 04/18/2022    RDW 13.6 04/18/2022     04/18/2022    MPV 9.7 09/07/2014     Lipids:  Lab Results   Component Value Date    CHOL 110 11/24/2021    CHOL 171 09/07/2014     Lab Results   Component Value Date    TRIG 95 11/24/2021    TRIG 167 09/07/2014     Lab Results   Component Value Date    HDL 37 (L) 11/24/2021    HDL 69 (H) 09/07/2014     Lab Results   Component Value Date    LDLCALC 54 11/24/2021    LDLCALC 69 09/07/2014     No results found for: LABVLDL, VLDL  No results found for: CHOLHDLRATIO  CMP:    Lab Results   Component Value Date     04/11/2022    K 4.1 04/11/2022    K 4.0 02/15/2022     04/11/2022    CO2 25 04/11/2022    BUN 25 04/11/2022    CREATININE 1.16 04/11/2022    GFRAA 54.3 04/11/2022    LABGLOM 44.9 04/11/2022    GLUCOSE 179 04/11/2022    PROT 8.2 04/11/2022    LABALBU 4.2 04/11/2022    CALCIUM 9.2 04/11/2022    BILITOT 0.3 04/11/2022    ALKPHOS 70 04/11/2022    AST 18 04/11/2022    ALT 9 04/11/2022     BMP:    Lab Results   Component Value Date     04/11/2022    K 4.1 04/11/2022    K 4.0 02/15/2022     04/11/2022    CO2 25 04/11/2022    BUN 25 04/11/2022    LABALBU 4.2 04/11/2022    CREATININE 1.16 04/11/2022    CALCIUM 9.2 04/11/2022    GFRAA 54.3 04/11/2022    LABGLOM 44.9 04/11/2022    GLUCOSE 179 04/11/2022     Magnesium:    Lab Results   Component Value Date    MG 1.8 04/04/2022     TSH:  Lab Results   Component Value Date    TSH 4.500 (H) 03/15/2022             Patient Active Problem List   Diagnosis    Hypothyroidism    Allergic rhinitis    Hypertension    Hyperlipidemia    Lichen planus    Hearing loss    Asthma    TIN (acute kidney injury) ([address]    Syncope    E. coli UTI    AMS (altered mental status)    [facility]    Hypertensive urgency       Medications Discontinued During This Encounter   Medication Reason    cloNIDine (CATAPRES) 0.2 MG tablet     carvedilol (COREG) 25 MG tablet REORDER   

## 2022-06-06 ENCOUNTER — TELEPHONE (OUTPATIENT)
Dept: FAMILY MEDICINE CLINIC | Age: 82
End: 2022-06-06

## 2022-06-06 DIAGNOSIS — N17.9 AKI (ACUTE KIDNEY INJURY) (HCC): ICD-10-CM

## 2022-06-06 DIAGNOSIS — D64.9 ANEMIA, UNSPECIFIED TYPE: Primary | ICD-10-CM

## 2022-06-06 NOTE — TELEPHONE ENCOUNTER
Patient needs a new referral for General Surgery due to ANNAMARIE WORTHINGTON JR. [facility] retiring. Please approve or deny.

## 2022-06-07 NOTE — TELEPHONE ENCOUNTER
Spoke to [address] [place] [address] office and they will reach out to her to schedule an appt.

## 2022-06-07 NOTE — TELEPHONE ENCOUNTER
I called patient to try to schedule an appointment with Dr. Aaliyah Monroy. A voicemail was left for patient to call back to schedule. This is the second time our office has tried to reach the patient.

## 2022-06-07 NOTE — TELEPHONE ENCOUNTER
It seems as if the referral for Dr. Lorenzo Urbina is closed. I'm going to reach out to that office and see if they need anything else.

## 2022-06-09 NOTE — TELEPHONE ENCOUNTER
Patients son called to check on a refill request update and states that his mother has been out of this medication for 2 weeks and is concerned that this may affect and cause her a trip to the hospital. Please approve /deny or advise

## 2022-06-09 NOTE — TELEPHONE ENCOUNTER
Patient is requesting medication refill. Please approve or deny this request.    Rx requested:  Requested Prescriptions     Pending Prescriptions Disp Refills    levothyroxine (SYNTHROID) 75 MCG tablet [Pharmacy Med Name: LEVOTHYROXINE 75 MCG TABLET] 30 tablet 1     Sig: Take 1 tablet by mouth         Last Office Visit:   5/5/2022      Next Visit Date:  No future appointments.

## 2022-06-10 RX ORDER — LEVOTHYROXINE SODIUM 0.07 MG/1
75 TABLET ORAL DAILY
Qty: 30 TABLET | Refills: 1 | Status: SHIPPED | OUTPATIENT
Start: 2022-06-10 | End: 2022-08-10

## 2022-06-21 ENCOUNTER — APPOINTMENT (OUTPATIENT)
Dept: GENERAL RADIOLOGY | Age: 82
End: 2022-06-21
Payer: COMMERCIAL

## 2022-06-21 ENCOUNTER — HOSPITAL ENCOUNTER (EMERGENCY)
Age: 82
Discharge: HOME | End: 2022-06-21
Payer: COMMERCIAL

## 2022-06-21 VITALS
TEMPERATURE: 97.3 F | BODY MASS INDEX: 18.78 KG/M2 | DIASTOLIC BLOOD PRESSURE: 78 MMHG | SYSTOLIC BLOOD PRESSURE: 164 MMHG | WEIGHT: 110 LBS | HEIGHT: 64 IN | RESPIRATION RATE: 14 BRPM | OXYGEN SATURATION: 97 % | HEART RATE: 65 BPM

## 2022-06-21 DIAGNOSIS — I16.0 HYPERTENSIVE URGENCY: Primary | ICD-10-CM

## 2022-06-21 LAB
ALBUMIN SERPL-MCNC: 4.6 G/DL (ref 3.5–4.6)
ALP BLD-CCNC: 71 U/L (ref 40–130)
ALT SERPL-CCNC: 12 U/L (ref 0–33)
ANION GAP SERPL CALCULATED.3IONS-SCNC: 14 MEQ/L (ref 9–15)
AST SERPL-CCNC: 22 U/L (ref 0–35)
BASOPHILS ABSOLUTE: 0 K/UL (ref 0–0.2)
BASOPHILS RELATIVE PERCENT: 0.6 %
BILIRUB SERPL-MCNC: 0.7 MG/DL (ref 0.2–0.7)
BUN BLDV-MCNC: 17 MG/DL (ref 8–23)
CALCIUM SERPL-MCNC: 9.7 MG/DL (ref 8.5–9.9)
CHLORIDE BLD-SCNC: 96 MEQ/L (ref 95–107)
CO2: 24 MEQ/L (ref 20–31)
CREAT SERPL-MCNC: 1.12 MG/DL (ref 0.5–0.9)
EOSINOPHILS ABSOLUTE: 0 K/UL (ref 0–0.7)
EOSINOPHILS RELATIVE PERCENT: 0.9 %
GFR AFRICAN AMERICAN: 56.6
GFR NON-AFRICAN AMERICAN: 46.7
GLOBULIN: 4.5 G/DL (ref 2.3–3.5)
GLUCOSE BLD-MCNC: 100 MG/DL (ref 70–99)
HCT VFR BLD CALC: 34.1 % (ref 37–47)
HEMOGLOBIN: 11.9 G/DL (ref 12–16)
LYMPHOCYTES ABSOLUTE: 0.8 K/UL (ref 1–4.8)
LYMPHOCYTES RELATIVE PERCENT: 19.8 %
MAGNESIUM: 2.1 MG/DL (ref 1.7–2.4)
MCH RBC QN AUTO: 32.5 PG (ref 27–31.3)
MCHC RBC AUTO-ENTMCNC: 34.9 % (ref 33–37)
MCV RBC AUTO: 93 FL (ref 82–100)
MONOCYTES ABSOLUTE: 0.4 K/UL (ref 0.2–0.8)
MONOCYTES RELATIVE PERCENT: 8.8 %
NEUTROPHILS ABSOLUTE: 2.9 K/UL (ref 1.4–6.5)
NEUTROPHILS RELATIVE PERCENT: 69.9 %
PDW BLD-RTO: 13.1 % (ref 11.5–14.5)
PLATELET # BLD: 164 K/UL (ref 130–400)
POTASSIUM SERPL-SCNC: 4.4 MEQ/L (ref 3.4–4.9)
PRO-BNP: 670 PG/ML
RBC # BLD: 3.66 M/UL (ref 4.2–5.4)
SODIUM BLD-SCNC: 134 MEQ/L (ref 135–144)
TOTAL PROTEIN: 9.1 G/DL (ref 6.3–8)
TROPONIN: <0.01 NG/ML (ref 0–0.01)
WBC # BLD: 4.1 K/UL (ref 4.8–10.8)

## 2022-06-21 PROCEDURE — 80053 COMPREHEN METABOLIC PANEL: CPT

## 2022-06-21 PROCEDURE — 93005 ELECTROCARDIOGRAM TRACING: CPT

## 2022-06-21 PROCEDURE — 84484 ASSAY OF TROPONIN QUANT: CPT

## 2022-06-21 PROCEDURE — 83735 ASSAY OF MAGNESIUM: CPT

## 2022-06-21 PROCEDURE — 83880 ASSAY OF NATRIURETIC PEPTIDE: CPT

## 2022-06-21 PROCEDURE — 71045 X-RAY EXAM CHEST 1 VIEW: CPT

## 2022-06-21 PROCEDURE — 36415 COLL VENOUS BLD VENIPUNCTURE: CPT

## 2022-06-21 PROCEDURE — 85025 COMPLETE CBC W/AUTO DIFF WBC: CPT

## 2022-06-21 PROCEDURE — 6370000000 HC RX 637 (ALT 250 FOR IP)

## 2022-06-21 PROCEDURE — 99285 EMERGENCY DEPT VISIT HI MDM: CPT

## 2022-06-21 RX ORDER — ISOSORBIDE MONONITRATE 60 MG/1
60 TABLET, EXTENDED RELEASE ORAL ONCE
Status: COMPLETED | OUTPATIENT
Start: 2022-06-21 | End: 2022-06-21

## 2022-06-21 RX ORDER — CLONIDINE HYDROCHLORIDE 0.1 MG/1
0.1 TABLET ORAL ONCE
Status: COMPLETED | OUTPATIENT
Start: 2022-06-21 | End: 2022-06-21

## 2022-06-21 RX ORDER — CLONIDINE HYDROCHLORIDE 0.1 MG/1
0.1 TABLET ORAL ONCE
Status: DISCONTINUED | OUTPATIENT
Start: 2022-06-21 | End: 2022-06-21

## 2022-06-21 RX ADMIN — CLONIDINE HYDROCHLORIDE 0.1 MG: 0.1 TABLET ORAL at 19:59

## 2022-06-21 RX ADMIN — ISOSORBIDE MONONITRATE 60 MG: 60 TABLET, EXTENDED RELEASE ORAL at 17:29

## 2022-06-21 ASSESSMENT — ENCOUNTER SYMPTOMS
VOMITING: 0
DIARRHEA: 0
COUGH: 0
ABDOMINAL PAIN: 0
PHOTOPHOBIA: 0
SHORTNESS OF BREATH: 0
NAUSEA: 0

## 2022-06-21 ASSESSMENT — PAIN - FUNCTIONAL ASSESSMENT: PAIN_FUNCTIONAL_ASSESSMENT: NONE - DENIES PAIN

## 2022-06-21 NOTE — ED TRIAGE NOTES
Pt to the ED via walk into triage with c/o HTN. Pt BP is 229/91 in triage. Pt sent by doctor to be seen. Pt states that she never misses her medications and they just arent working.

## 2022-06-21 NOTE — ED PROVIDER NOTES
[facility] ED  eMERGENCY dEPARTMENT eNCOUnter      Pt Name: Ceferino Thakkar  MRN: [id number]  [place]avi 1940  Date of evaluation: 6/21/2022  Provider: RUPESH Peña        HISTORY OF PRESENT ILLNESS    Ceferino Thakkar is a [de-identified] y.o. female per chart review has ah/o hypothyroidism, uncontrolled HTN, CVA, HLD. Presents to the emergency department for elevated blood pressure in the outpatient setting. With history of uncontrollable hypertension, has been evaluated several times in this ED as well as followed outpatient by Dr. Torrie Thorpe for this. She is on Imdur, carvedilol, lisinopril which she states she has been taking. She was on clonidine but discontinued herself. She was being seen by her primary care today and noted to have elevated pressure so directed here. States she had headache, intermittent white spots in her vision that are gone now. Outpatient physician was also reporting some mild confusion, son is at bedside and states he has been noticing some mild confusion recently but he is she is at her baseline now patient is also stating she does not feel confused. Alert and oriented x4. No focal deficits or complaints right now. Just concerned as her blood pressure has been high and difficulty with control    On arrival discussed diagnostics with patient discussed doing a CT head d/t history of headache, vision changes in the setting of HTN patient states she is not experiencing any of these symptoms right and is very concerned about radiation to her head as she has had several head CTs recently, she is initially declining the study, discussed the recommendation and reasoning with both patient and son who continue to decline. Further discussion patient reports that her dizziness actually comes along with carvedilol, states this makes her symptomatic, do note that she has reported this outpatient in the past.  Denying dizziness at present and outside of carvedilol use.     REVIEW OF SYSTEMS Review of Systems   Constitutional: Negative for activity change, chills and fever. HENT: Negative for congestion. Eyes: Positive for visual disturbance. Negative for photophobia. Respiratory: Negative for cough and shortness of breath. Cardiovascular: Negative for chest pain and palpitations. Gastrointestinal: Negative for abdominal pain, diarrhea, nausea and vomiting. Genitourinary: Negative for difficulty urinating. Musculoskeletal: Negative for myalgias. Neurological: Positive for headaches. Negative for dizziness, tremors, seizures, syncope, facial asymmetry, speech difficulty, weakness, light-headedness and numbness. Psychiatric/Behavioral: Negative for behavioral problems and confusion. Except as noted above the remainder of the review of systems was reviewed and negative. PAST MEDICAL HISTORY     Past Medical History:   Diagnosis Date    Allergic rhinitis     Asthma     uses flovent    Hearing loss     Hyperlipidemia     Hypertension     Hypothyroidism     Lichen planus     Dr. Abrahan Peterson       Past Surgical History:   Procedure Laterality Date    HYSTERECTOMY, TOTAL ABDOMINAL (CERVIX REMOVED)      TONSILLECTOMY           CURRENT MEDICATIONS       Previous Medications    ARTIFICIAL TEAR SOLUTION (TEARS AGAIN ADVANCED EYELID) SOLN    Apply to eye    ASPIRIN 81 MG CHEWABLE TABLET    Take 1 tablet by mouth daily    ATORVASTATIN (LIPITOR) 20 MG TABLET    Take 1 tablet by mouth daily    CARVEDILOL (COREG) 25 MG TABLET    Take 1 tablet by mouth 2 times daily    FLOVENT HFA 44 MCG/ACT INHALER    INHALE 1 PUFF BY MOUTH TWICE DAILY FOR 90 DAYS.     ISOSORBIDE MONONITRATE (IMDUR) 60 MG EXTENDED RELEASE TABLET    Take 1 tablet by mouth daily    LEVOTHYROXINE (SYNTHROID) 75 MCG TABLET    Take 1 tablet by mouth Daily    LISINOPRIL (PRINIVIL;ZESTRIL) 40 MG TABLET    Take 1 tablet by mouth daily       ALLERGIES     Albuterol, Amlodipine, Hydralazine, Metformin and related, and Zoster vac recomb adjuvanted    FAMILY HISTORY       Family History   Problem Relation Age of Onset    High Blood Pressure Mother           SOCIAL HISTORY       Social History     Socioeconomic History    Marital status: Single     Spouse name: None    Number of children: 2    Years of education: None    Highest education level: None   Occupational History    None   Tobacco Use    Smoking status: Never Smoker    Smokeless tobacco: Never Used   Vaping Use    Vaping Use: Never used   Substance and Sexual Activity    Alcohol use: No    Drug use: No    Sexual activity: Not Currently   Other Topics Concern    None   Social History Narrative    Has 2 sons. One of her sons lives with her in a mobile home. Social Determinants of Health     Financial Resource Strain: Low Risk     Difficulty of Paying Living Expenses: Not hard at all   Food Insecurity: No Food Insecurity    Worried About Running Out of Food in the Last Year: Never true    Genevieve of Food in the Last Year: Never true   Transportation Needs:     Lack of Transportation (Medical): Not on file    Lack of Transportation (Non-Medical):  Not on file   Physical Activity:     Days of Exercise per Week: Not on file    Minutes of Exercise per Session: Not on file   Stress:     Feeling of Stress : Not on file   Social Connections:     Frequency of Communication with Friends and Family: Not on file    Frequency of Social Gatherings with Friends and Family: Not on file    Attends Restorationism Services: Not on file    Active Member of Clubs or Organizations: Not on file    Attends Club or Organization Meetings: Not on file    Marital Status: Not on file   Intimate Partner Violence:     Fear of Current or Ex-Partner: Not on file    Emotionally Abused: Not on file    Physically Abused: Not on file    Sexually Abused: Not on file   Housing Stability:     Unable to Pay for Housing in the Last Year: Not on file    Number of Places Lived in the Last Year: Not on file    Unstable Housing in the Last Year: Not on file         PHYSICAL EXAM        ED Triage Vitals [06/21/22 1620]   BP Temp Temp Source Heart Rate Resp SpO2 Height Weight   (!) 229/91 97.3 °F (36.3 °C) Oral 68 16 95 % 5' 4\" (1.626 m) 110 lb (49.9 kg)       Physical Exam  Constitutional:       General: She is not in acute distress. Appearance: Normal appearance. She is not ill-appearing, toxic-appearing or diaphoretic. HENT:      Head: Normocephalic and atraumatic. Right Ear: External ear normal.      Left Ear: External ear normal.      Nose: Nose normal.      Mouth/Throat:      Mouth: Mucous membranes are moist.      Pharynx: Oropharynx is clear. Eyes:      Extraocular Movements: Extraocular movements intact. Conjunctiva/sclera: Conjunctivae normal.      Pupils: Pupils are equal, round, and reactive to light. Cardiovascular:      Rate and Rhythm: Normal rate and regular rhythm. Pulses: Normal pulses. Pulmonary:      Effort: Pulmonary effort is normal. No respiratory distress. Breath sounds: Normal breath sounds. No wheezing. Abdominal:      General: Bowel sounds are normal.      Palpations: Abdomen is soft. Tenderness: There is no abdominal tenderness. Musculoskeletal:         General: Normal range of motion. Cervical back: Normal range of motion. No rigidity or tenderness. Skin:     General: Skin is warm. Neurological:      General: No focal deficit present. Mental Status: She is alert and oriented to person, place, and time. Cranial Nerves: No cranial nerve deficit. Sensory: No sensory deficit. Motor: No weakness. Coordination: Coordination normal.      Gait: Gait normal.   Psychiatric:         Mood and Affect: Mood normal.         Behavior: Behavior normal.         Thought Content:  Thought content normal.         Judgment: Judgment normal.           LABS:  Labs Reviewed   COMPREHENSIVE METABOLIC PANEL - Abnormal; Notable for the following components:       Result Value    Sodium 134 (*)     Glucose 100 (*)     CREATININE 1.12 (*)     GFR Non- 46.7 (*)     GFR  56.6 (*)     Total Protein 9.1 (*)     Globulin 4.5 (*)     All other components within normal limits   CBC WITH AUTO DIFFERENTIAL - Abnormal; Notable for the following components:    WBC 4.1 (*)     RBC 3.66 (*)     Hemoglobin 11.9 (*)     Hematocrit 34.1 (*)     MCH 32.5 (*)     Lymphocytes Absolute 0.8 (*)     All other components within normal limits   MAGNESIUM   TROPONIN   BRAIN NATRIURETIC PEPTIDE     EKG NSR HR 65 regular intervals no axis deviation no acute ST changes    MDM:   Vitals:    Vitals:    06/21/22 1815 06/21/22 1830 06/21/22 1845 06/21/22 1900   BP: (!) 178/87 (!) 203/95 (!) 181/86 (!) 188/91   Pulse: 63 67 62 62   Resp: 13 18 20 15   Temp:       TempSrc:       SpO2: 96% 96% 97% 97%   Weight:       Height:           81-year-old female patient with history of resistant hypertension, presents emergency department with elevated blood pressure in the outpatient setting. Also reporting intermittent dizziness, intermittent white spots in her vision and sometimes feeling out of it/foggy. States dizziness only occurs when she takes her carvedilol. Also reported symptoms with clonidine which is why she self discontinued recently. At present patient states has no symptoms, no headache dizziness vision disturbance chest pain shortness of breath weakness or numbness. Alert and oriented x4 does not feel confused, son is at bedside reports she is acting herself and is at her baseline. Patient takes 3 blood pressure medications, has had several ED visits, outpatient visits, hospitalization for hypertensive urgency/emeall of which has been negative to this point. Did see Dr. Taiwo Liang recently this is who she follows for this. On arrival patient's blood pressure is 221/91, given 60 mg Imdur.   Blood pressure remains about 180s over 90s with this. Not need remedicated. She continues to deny symptoms. She was also offered a CT head scan on arrival and she and son refused several times due to multiple recent CT head examinations. Risks explained continue to refuse. No focal deficits on examination at this time. No evidence of acute heart failure, chest x-ray, negative BNP, negative troponin, no acute ST changes. Remains very comfortable in the ED fully alert and oriented conversive and appropriate. Patient and son would really like her to be admitted for her hypertension due to frustration at continued ED visits and no resolution which is understandable. I did speak with cardiology on-call Dr. Bailee Vale, recommending admission for at least observation, per Dr. Bailee Vale if patient's blood pressure remains controlled and she is still asymptomatic, she can be discharged home and see Dr. Kojo Alvarado in the office as he has office hours tomorrow. I discussed this at length with patient and son and again offered admission if they do not feel comfortable with this plan, but they are stating they feel comfortable contacting Dr. Tania Waldron office in the morning for close follow-up, will return if new or worse symptoms, very strict return precautions. Will defer medication changes to Dr. Kojo Alvarado at this time. CRITICAL CARE TIME   Total CriticalCare time was 0 minutes, excluding separately reportable procedures. There was a high probability of clinically significant/life threatening deterioration in the patient's condition which required my urgent intervention. PROCEDURES:  Unlessotherwise noted below, none      Procedures      FINAL IMPRESSION      1.  Hypertensive urgency          DISPOSITION/PLAN   DISPOSITION Decision To Discharge 06/21/2022 07:24:24 PM          RUPESH Willis (electronically signed)  Attending Emergency Physician          Arnaldo Willisma  06/21/22 8766

## 2022-06-22 LAB
EKG ATRIAL RATE: 65 BPM
EKG P AXIS: 64 DEGREES
EKG P-R INTERVAL: 164 MS
EKG Q-T INTERVAL: 386 MS
EKG QRS DURATION: 70 MS
EKG QTC CALCULATION (BAZETT): 401 MS
EKG R AXIS: 5 DEGREES
EKG T AXIS: 68 DEGREES
EKG VENTRICULAR RATE: 65 BPM

## 2022-06-22 PROCEDURE — 93010 ELECTROCARDIOGRAM REPORT: CPT | Performed by: INTERNAL MEDICINE

## 2022-06-22 NOTE — ED NOTES
Patient was up for discharge but recheck BP is 220/90, reported to [facility], orders received     Za Silvestre RN  06/21/22 2015

## 2022-07-26 ENCOUNTER — TELEPHONE (OUTPATIENT)
Dept: FAMILY MEDICINE CLINIC | Age: 82
End: 2022-07-26

## 2022-08-09 NOTE — TELEPHONE ENCOUNTER
Pharmacy is requesting medication refill. Please approve or deny this request.    Rx requested:  Requested Prescriptions     Pending Prescriptions Disp Refills    levothyroxine (SYNTHROID) 75 MCG tablet [Pharmacy Med Name: LEVOTHYROXINE 75 MCG TABLET] 30 tablet 1     Sig: Take 1 tablet by mouth Daily         Last Office Visit:   5/5/2022      Next Visit Date:  No future appointments.

## 2022-08-10 ENCOUNTER — TELEPHONE (OUTPATIENT)
Dept: FAMILY MEDICINE CLINIC | Age: 82
End: 2022-08-10

## 2022-08-10 RX ORDER — LEVOTHYROXINE SODIUM 0.07 MG/1
75 TABLET ORAL DAILY
Qty: 30 TABLET | Refills: 1 | Status: SHIPPED | OUTPATIENT
Start: 2022-08-10 | End: 2022-10-11

## 2022-08-17 NOTE — TELEPHONE ENCOUNTER
Pharmacy is requesting medication refill. Please approve or deny this request.    Rx requested:  Requested Prescriptions     Pending Prescriptions Disp Refills    atorvastatin (LIPITOR) 20 MG tablet [Pharmacy Med Name: ATORVASTATIN 20 MG TABLET] 90 tablet 0     Sig: Take 1 tablet by mouth daily         Last Office Visit:   5/5/2022      Next Visit Date:  No future appointments.

## 2022-08-19 RX ORDER — ATORVASTATIN CALCIUM 20 MG/1
20 TABLET, FILM COATED ORAL DAILY
Qty: 90 TABLET | Refills: 0 | Status: SHIPPED | OUTPATIENT
Start: 2022-08-19

## 2022-09-09 ENCOUNTER — TELEPHONE (OUTPATIENT)
Dept: CARDIOLOGY CLINIC | Age: 82
End: 2022-09-09

## 2022-09-09 NOTE — TELEPHONE ENCOUNTER
Pt needs a prior Auth on Lisinopril. Only two pills left. Jock  insurance   Uses [facility] instead? Pt # [phone]    CICI with Sharmin Lomas, pt has 2 refills left. Calling pt back to let them know that they can call [employer] for refills.

## 2022-09-09 NOTE — TELEPHONE ENCOUNTER
Unable to reach [facility]. Spoke with Gianna Flores, he will try to reach them to let them know to call [facility] for the medication refill.

## 2022-10-11 RX ORDER — LEVOTHYROXINE SODIUM 0.07 MG/1
75 TABLET ORAL DAILY
Qty: 30 TABLET | Refills: 1 | Status: SHIPPED | OUTPATIENT
Start: 2022-10-11

## 2022-10-24 ENCOUNTER — OFFICE VISIT (OUTPATIENT)
Dept: FAMILY MEDICINE CLINIC | Age: 82
End: 2022-10-24
Payer: COMMERCIAL

## 2022-10-24 VITALS
DIASTOLIC BLOOD PRESSURE: 88 MMHG | HEIGHT: 64 IN | OXYGEN SATURATION: 97 % | WEIGHT: 115.4 LBS | BODY MASS INDEX: 19.7 KG/M2 | TEMPERATURE: 98.4 F | HEART RATE: 64 BPM | SYSTOLIC BLOOD PRESSURE: 174 MMHG

## 2022-10-24 DIAGNOSIS — J43.8 OTHER EMPHYSEMA (HCC): ICD-10-CM

## 2022-10-24 DIAGNOSIS — E03.9 HYPOTHYROIDISM, UNSPECIFIED TYPE: Primary | ICD-10-CM

## 2022-10-24 DIAGNOSIS — E78.2 MIXED HYPERLIPIDEMIA: ICD-10-CM

## 2022-10-24 DIAGNOSIS — D64.9 ANEMIA, UNSPECIFIED TYPE: ICD-10-CM

## 2022-10-24 DIAGNOSIS — N17.9 AKI (ACUTE KIDNEY INJURY) (HCC): ICD-10-CM

## 2022-10-24 PROCEDURE — 99204 OFFICE O/P NEW MOD 45 MIN: CPT | Performed by: FAMILY MEDICINE

## 2022-10-24 PROCEDURE — 1123F ACP DISCUSS/DSCN MKR DOCD: CPT | Performed by: FAMILY MEDICINE

## 2022-10-24 RX ORDER — FLUTICASONE PROPIONATE 44 MCG
AEROSOL WITH ADAPTER (GRAM) INHALATION
Qty: 1 EACH | Refills: 11 | Status: SHIPPED | OUTPATIENT
Start: 2022-10-24

## 2022-10-24 RX ORDER — LISINOPRIL 40 MG/1
40 TABLET ORAL DAILY
Qty: 90 TABLET | Refills: 2 | Status: SHIPPED | OUTPATIENT
Start: 2022-10-24

## 2022-10-24 ASSESSMENT — ENCOUNTER SYMPTOMS
ABDOMINAL PAIN: 0
SHORTNESS OF BREATH: 0
PHOTOPHOBIA: 0
ABDOMINAL DISTENTION: 0
CHEST TIGHTNESS: 0

## 2022-10-24 NOTE — PATIENT INSTRUCTIONS
Patient is going to restart Flovent inhaler 2 puffs twice a day until seen at follow-up. Refills provided. Patient will restart thyroid medication at 75 mcg as previously taken. Blood work will be done in about 4 weeks. Order created. Dose adjustments will be done at that time if needed. Patient will return to office with home blood pressure monitor to check for accuracy. We will then look at home blood pressure tendency and decide whether we need to change medication at all in the meantime we are refilling her lisinopril. No action on cholesterol at this time. History of anemia will have blood work done when she has a thyroid done to follow current status.

## 2022-10-24 NOTE — PROGRESS NOTES
Diagnosis Orders   1. Hypothyroidism, unspecified type  TSH with Reflex      2. Anemia, unspecified type  CBC with Auto Differential      3. TIN (acute kidney injury) ([address]  lisinopril (PRINIVIL;ZESTRIL) 40 MG tablet    Comprehensive Metabolic Panel      4. Mixed hyperlipidemia        5. Other emphysema ([address]  FLOVENT HFA 44 MCG/ACT inhaler        Return in about 4 weeks (around 11/21/2022) for for review of outcome of today's recommendation. Patient Instructions   Patient is going to restart Flovent inhaler 2 puffs twice a day until seen at follow-up. Refills provided. Patient will restart thyroid medication at 75 mcg as previously taken. Blood work will be done in about 4 weeks. Order created. Dose adjustments will be done at that time if needed. Patient will return to office with home blood pressure monitor to check for accuracy. We will then look at home blood pressure tendency and decide whether we need to change medication at all in the meantime we are refilling her lisinopril. No action on cholesterol at this time. History of anemia will have blood work done when she has a thyroid done to follow current status. Subjective:      Patient ID: Aaliyah Meza is a [de-identified] y.o. female who presents for:  Chief Complaint   Patient presents with    Establish Care     For PCP prior pcp DR Ashlee Luciano        She is here with her son for evaluation. They are not sure why she takes Flovent but she has been out of it. She was mistakenly given lisinopril 20 mg a day so she has been taking 2 of them a day but ran out. She supposed be on lisinopril 40 mg. She does have a home blood pressure cuff and her blood pressure systolic have been ranging anywhere from 128-170. It has not been checked for accuracy. Patient states she has been out of her levothyroxine for 2 weeks and feels the side effects of that.   She did recently  prescription that was finally approved and will be restarting that tonight despite is supposed be taking in the morning because she has been out of it for so long. Current Outpatient Medications on File Prior to Visit   Medication Sig Dispense Refill    levothyroxine (SYNTHROID) 75 MCG tablet Take 1 tablet by mouth Daily 30 tablet 1    atorvastatin (LIPITOR) 20 MG tablet Take 1 tablet by mouth daily 90 tablet 0    carvedilol (COREG) 25 MG tablet Take 1 tablet by mouth 2 times daily 180 tablet 3    isosorbide mononitrate (IMDUR) 60 MG extended release tablet Take 1 tablet by mouth daily 90 tablet 3     No current facility-administered medications on file prior to visit. Past Medical History:   Diagnosis Date    Allergic rhinitis     Asthma     uses flovent    Hearing loss     Hyperlipidemia     Hypertension     Hypothyroidism     Lichen planus     Dr. Phani Soares     Past Surgical History:   Procedure Laterality Date    HYSTERECTOMY, TOTAL ABDOMINAL (CERVIX REMOVED)      TONSILLECTOMY       Social History     Socioeconomic History    Marital status: Single     Spouse name: Not on file    Number of children: 2    Years of education: Not on file    Highest education level: Not on file   Occupational History    Not on file   Tobacco Use    Smoking status: Never    Smokeless tobacco: Never   Vaping Use    Vaping Use: Never used   Substance and Sexual Activity    Alcohol use: No    Drug use: No    Sexual activity: Not Currently   Other Topics Concern    Not on file   Social History Narrative    Has 2 sons. One of her sons lives with her in a mobile home.      Social Determinants of Health     Financial Resource Strain: Low Risk     Difficulty of Paying Living Expenses: Not hard at all   Food Insecurity: No Food Insecurity    Worried About Running Out of Food in the Last Year: Never true    Ran Out of Food in the Last Year: Never true   Transportation Needs: Not on file   Physical Activity: Not on file   Stress: Not on file   Social Connections: Not on file   Intimate Partner Violence: Not on file   Housing Stability: Not on file     Family History   Problem Relation Age of Onset    High Blood Pressure Mother      Allergies:  Albuterol, Amlodipine, Hydralazine, Metformin and related, and Zoster vac recomb adjuvanted    Review of Systems   Constitutional:  Positive for fatigue. Negative for activity change, appetite change, diaphoresis and unexpected weight change. Eyes:  Negative for photophobia and visual disturbance. Respiratory:  Negative for chest tightness and shortness of breath. No orthopnea   Cardiovascular:  Negative for chest pain, palpitations and leg swelling. Gastrointestinal:  Negative for abdominal distention and abdominal pain. Genitourinary:  Negative for flank pain and frequency. Musculoskeletal:  Negative for gait problem and joint swelling. Skin:         Loss hair   Neurological:  Negative for dizziness, weakness, light-headedness and headaches. Psychiatric/Behavioral:  Negative for confusion. Objective:   BP (!) 174/88   Pulse 64   Temp 98.4 °F (36.9 °C)   Ht 5' 4\" (1.626 m)   Wt 115 lb 6.4 oz (52.3 kg)   SpO2 97%   BMI 19.81 kg/m²     Physical Exam    No results found for this visit on 10/24/22. No results found for this or any previous visit (from the past 2016 hour(s)). [] Pt was seen by provider for      Minutes  Counseling and coordination of care was done for all assessment diagnosis listed for today with patient and any family/friend present. More than 50% of this visit was spent coordinating current care, obtaining information for prior records, and counseling for current plan of action. Assessment:       Diagnosis Orders   1. Hypothyroidism, unspecified type  TSH with Reflex      2. Anemia, unspecified type  CBC with Auto Differential      3. TIN (acute kidney injury) ([facility] [address]  lisinopril (PRINIVIL;ZESTRIL) 40 MG tablet    Comprehensive Metabolic Panel      4. Mixed hyperlipidemia        5.  Other emphysema ([facility] [address]  [address] [facility] 44 MCG/ACT inhaler            Orders Placed This Encounter   Procedures    TSH with Reflex     Standing Status:   Future     Standing Expiration Date:   10/24/2023    CBC with Auto Differential     Standing Status:   Future     Standing Expiration Date:   10/24/2023    Comprehensive Metabolic Panel     Standing Status:   Future     Standing Expiration Date:   10/24/2023       Orders Placed This Encounter   Medications    FLOVENT HFA 44 MCG/ACT inhaler     Sig: INHALE 1 PUFF BY MOUTH TWICE DAILY FOR 90 DAYS. Dispense:  1 each     Refill:  11    lisinopril (PRINIVIL;ZESTRIL) 40 MG tablet     Sig: Take 1 tablet by mouth daily     Dispense:  90 tablet     Refill:  2          Medication List            Accurate as of October 24, 2022  7:14 PM. If you have any questions, ask your nurse or doctor.                 CONTINUE taking these medications      atorvastatin 20 MG tablet  Commonly known as: LIPITOR  Take 1 tablet by mouth daily     carvedilol 25 MG tablet  Commonly known as: COREG  Take 1 tablet by mouth 2 times daily     Flovent HFA 44 MCG/ACT inhaler  Generic drug: fluticasone  INHALE 1 PUFF BY MOUTH TWICE DAILY FOR 90 DAYS.     isosorbide mononitrate 60 MG extended release tablet  Commonly known as: IMDUR  Take 1 tablet by mouth daily     levothyroxine 75 MCG tablet  Commonly known as: SYNTHROID  Take 1 tablet by mouth Daily     lisinopril 40 MG tablet  Commonly known as: PRINIVIL;ZESTRIL  Take 1 tablet by mouth daily            STOP taking these medications      aspirin 81 MG chewable tablet  Stopped by: Carlton Lanes, MD     Tears Again Advanced Eyelid Soln  Stopped by: Carlton Lanes, MD               Where to Get Your Medications        These medications were sent to [facility],  Vladimir Santiago  [address] [place] [address]      Phone: [phone]   Flovent HFA 44 MCG/ACT inhaler  lisinopril 40 MG tablet           Plan: Return in about 4 weeks (around 11/21/2022) for for review of outcome of today's recommendation. Patient Instructions   Patient is going to restart Flovent inhaler 2 puffs twice a day until seen at follow-up. Refills provided. Patient will restart thyroid medication at 75 mcg as previously taken. Blood work will be done in about 4 weeks. Order created. Dose adjustments will be done at that time if needed. Patient will return to office with home blood pressure monitor to check for accuracy. We will then look at home blood pressure tendency and decide whether we need to change medication at all in the meantime we are refilling her lisinopril. No action on cholesterol at this time. History of anemia will have blood work done when she has a thyroid done to follow current status. This note was partially created with the assistance of dictation. This may lead to grammatical or spelling errors. Dean Palma M.D.

## 2022-11-10 DIAGNOSIS — D64.9 ANEMIA, UNSPECIFIED TYPE: ICD-10-CM

## 2022-11-10 DIAGNOSIS — N17.9 AKI (ACUTE KIDNEY INJURY) (HCC): ICD-10-CM

## 2022-11-10 DIAGNOSIS — E03.9 HYPOTHYROIDISM, UNSPECIFIED TYPE: ICD-10-CM

## 2022-11-10 LAB
ALBUMIN SERPL-MCNC: 4.4 G/DL (ref 3.5–4.6)
ALP BLD-CCNC: 57 U/L (ref 40–130)
ALT SERPL-CCNC: 14 U/L (ref 0–33)
ANION GAP SERPL CALCULATED.3IONS-SCNC: 13 MEQ/L (ref 9–15)
AST SERPL-CCNC: 32 U/L (ref 0–35)
BASOPHILS ABSOLUTE: 0 K/UL (ref 0–0.2)
BASOPHILS RELATIVE PERCENT: 0.7 %
BILIRUB SERPL-MCNC: 0.8 MG/DL (ref 0.2–0.7)
BUN BLDV-MCNC: 20 MG/DL (ref 8–23)
CALCIUM SERPL-MCNC: 9.4 MG/DL (ref 8.5–9.9)
CHLORIDE BLD-SCNC: 103 MEQ/L (ref 95–107)
CO2: 26 MEQ/L (ref 20–31)
CREAT SERPL-MCNC: 0.89 MG/DL (ref 0.5–0.9)
EOSINOPHILS ABSOLUTE: 0.1 K/UL (ref 0–0.7)
EOSINOPHILS RELATIVE PERCENT: 1.2 %
GFR SERPL CREATININE-BSD FRML MDRD: >60 ML/MIN/{1.73_M2}
GLOBULIN: 4.4 G/DL (ref 2.3–3.5)
GLUCOSE BLD-MCNC: 109 MG/DL (ref 70–99)
HCT VFR BLD CALC: 36 % (ref 37–47)
HEMOGLOBIN: 12.3 G/DL (ref 12–16)
LYMPHOCYTES ABSOLUTE: 1 K/UL (ref 1–4.8)
LYMPHOCYTES RELATIVE PERCENT: 20.8 %
MCH RBC QN AUTO: 32.8 PG (ref 27–31.3)
MCHC RBC AUTO-ENTMCNC: 34.2 % (ref 33–37)
MCV RBC AUTO: 96 FL (ref 79.4–94.8)
MONOCYTES ABSOLUTE: 0.4 K/UL (ref 0.2–0.8)
MONOCYTES RELATIVE PERCENT: 8.8 %
NEUTROPHILS ABSOLUTE: 3.1 K/UL (ref 1.4–6.5)
NEUTROPHILS RELATIVE PERCENT: 68.5 %
PDW BLD-RTO: 13.3 % (ref 11.5–14.5)
PLATELET # BLD: 197 K/UL (ref 130–400)
POTASSIUM SERPL-SCNC: 4.6 MEQ/L (ref 3.4–4.9)
RBC # BLD: 3.75 M/UL (ref 4.2–5.4)
SODIUM BLD-SCNC: 142 MEQ/L (ref 135–144)
T4 FREE: 1.15 NG/DL (ref 0.84–1.68)
TOTAL PROTEIN: 8.8 G/DL (ref 6.3–8)
TSH REFLEX: 15.93 UIU/ML (ref 0.44–3.86)
WBC # BLD: 4.6 K/UL (ref 4.8–10.8)

## 2022-11-21 ENCOUNTER — OFFICE VISIT (OUTPATIENT)
Dept: FAMILY MEDICINE CLINIC | Age: 82
End: 2022-11-21
Payer: COMMERCIAL

## 2022-11-21 VITALS
TEMPERATURE: 97.9 F | OXYGEN SATURATION: 96 % | BODY MASS INDEX: 20.32 KG/M2 | DIASTOLIC BLOOD PRESSURE: 92 MMHG | HEIGHT: 64 IN | HEART RATE: 63 BPM | SYSTOLIC BLOOD PRESSURE: 220 MMHG | WEIGHT: 119 LBS

## 2022-11-21 DIAGNOSIS — I16.0 HYPERTENSIVE URGENCY: ICD-10-CM

## 2022-11-21 DIAGNOSIS — E03.9 HYPOTHYROIDISM, UNSPECIFIED TYPE: ICD-10-CM

## 2022-11-21 DIAGNOSIS — D64.9 ANEMIA, UNSPECIFIED TYPE: ICD-10-CM

## 2022-11-21 DIAGNOSIS — H91.93 BILATERAL HEARING LOSS, UNSPECIFIED HEARING LOSS TYPE: Primary | ICD-10-CM

## 2022-11-21 PROCEDURE — 99214 OFFICE O/P EST MOD 30 MIN: CPT | Performed by: FAMILY MEDICINE

## 2022-11-21 PROCEDURE — 3078F DIAST BP <80 MM HG: CPT | Performed by: FAMILY MEDICINE

## 2022-11-21 PROCEDURE — 1123F ACP DISCUSS/DSCN MKR DOCD: CPT | Performed by: FAMILY MEDICINE

## 2022-11-21 PROCEDURE — 3074F SYST BP LT 130 MM HG: CPT | Performed by: FAMILY MEDICINE

## 2022-11-21 RX ORDER — ATORVASTATIN CALCIUM 20 MG/1
20 TABLET, FILM COATED ORAL DAILY
Qty: 90 TABLET | Refills: 3 | Status: SHIPPED | OUTPATIENT
Start: 2022-11-21

## 2022-11-21 RX ORDER — LEVOTHYROXINE SODIUM 0.07 MG/1
75 TABLET ORAL DAILY
Qty: 90 TABLET | Refills: 1 | Status: CANCELLED | OUTPATIENT
Start: 2022-11-21

## 2022-11-21 RX ORDER — LEVOTHYROXINE SODIUM 0.1 MG/1
100 TABLET ORAL DAILY
Qty: 30 TABLET | Refills: 5 | Status: SHIPPED | OUTPATIENT
Start: 2022-11-21

## 2022-11-21 ASSESSMENT — ENCOUNTER SYMPTOMS
ABDOMINAL PAIN: 0
SHORTNESS OF BREATH: 0
ABDOMINAL DISTENTION: 0
CHEST TIGHTNESS: 0
PHOTOPHOBIA: 0

## 2022-11-21 NOTE — PROGRESS NOTES
Diagnosis Orders   1. Bilateral hearing loss, unspecified hearing loss type        2. Hypertensive urgency        3. Hypothyroidism, unspecified type  TSH with Reflex    levothyroxine (SYNTHROID) 100 MCG tablet      4. Anemia, unspecified type  CBC with Auto Differential    Vitamin B12 & Folate        Return in about 4 weeks (around 12/19/2022) for for review of outcome of today's recommendation. Patient Instructions   Isosorbide ER 60 mg to be taken at bedtime    Lisinopril can be taken in the morning with carvedilol 1/2 tab (25 mg tab)    Take  carvedilol 1/2 tab  (25 mg tab) at lunch and dinner time. Increase levothyroxine to 100 mcg daily    Blood work did demonstrate a small amount of anemia that looks like there is the possibility for vitamin B12 and folate deficiency. Therefore when patient repeats blood work in 3 to 4 weeks for thyroid we will recheck the anemia and for the possibility of B12 and folate deficiency as well and take action at that time. And is attempting to find someone who will take her insurance for hearing aids. Subjective:      Patient ID: Ke Alvarez is a [de-identified] y.o. female who presents for:  Chief Complaint   Patient presents with    Thyroid Problem     X 1 month follow up     Discuss Medications       Patient brings in her home blood pressure cuff which appears to be reading her accurately. Blood pressures at home have been ranging [id number]. However the patient states that sometimes about an hour after she takes her carvedilol especially in the morning she notes her blood pressure will drop to 90/50 and she feels very faint. On further discussion I find that she takes her lisinopril and her high isosorbide all the same time in the morning followed an hour later by her carvedilol. Patient has been taking her levothyroxine 75 and repeat lab work shows TSH is still too elevated we reviewed this today.   In addition to her CBC having a slight abnormality. Current Outpatient Medications on File Prior to Visit   Medication Sig Dispense Refill    FLOVENT HFA 44 MCG/ACT inhaler INHALE 1 PUFF BY MOUTH TWICE DAILY FOR 90 DAYS. 1 each 11    lisinopril (PRINIVIL;ZESTRIL) 40 MG tablet Take 1 tablet by mouth daily 90 tablet 2    carvedilol (COREG) 25 MG tablet Take 1 tablet by mouth 2 times daily 180 tablet 3    isosorbide mononitrate (IMDUR) 60 MG extended release tablet Take 1 tablet by mouth daily 90 tablet 3     No current facility-administered medications on file prior to visit. Past Medical History:   Diagnosis Date    Allergic rhinitis     Asthma     uses flovent    Hearing loss     Hyperlipidemia     Hypertension     Hypothyroidism     Lichen planus     Dr. Stefany Quevedo     Past Surgical History:   Procedure Laterality Date    HYSTERECTOMY, TOTAL ABDOMINAL (CERVIX REMOVED)      TONSILLECTOMY       Social History     Socioeconomic History    Marital status: Single     Spouse name: Not on file    Number of children: 2    Years of education: Not on file    Highest education level: Not on file   Occupational History    Not on file   Tobacco Use    Smoking status: Never    Smokeless tobacco: Never   Vaping Use    Vaping Use: Never used   Substance and Sexual Activity    Alcohol use: No    Drug use: No    Sexual activity: Not Currently   Other Topics Concern    Not on file   Social History Narrative    Has 2 sons. One of her sons lives with her in a mobile home.      Social Determinants of Health     Financial Resource Strain: Low Risk     Difficulty of Paying Living Expenses: Not hard at all   Food Insecurity: No Food Insecurity    Worried About Running Out of Food in the Last Year: Never true    Ran Out of Food in the Last Year: Never true   Transportation Needs: Not on file   Physical Activity: Not on file   Stress: Not on file   Social Connections: Not on file   Intimate Partner Violence: Not on file   Housing Stability: Not on file     Family History Problem Relation Age of Onset    High Blood Pressure Mother      Allergies:  Albuterol, Amlodipine, Hydralazine, Metformin and related, and Zoster vac recomb adjuvanted    Review of Systems   Constitutional:  Negative for activity change, appetite change, diaphoresis and unexpected weight change. Eyes:  Negative for photophobia and visual disturbance. Respiratory:  Negative for chest tightness and shortness of breath. No orthopnea   Cardiovascular:  Negative for chest pain, palpitations and leg swelling. Gastrointestinal:  Negative for abdominal distention and abdominal pain. Genitourinary:  Negative for flank pain and frequency. Musculoskeletal:  Negative for gait problem and joint swelling. Neurological:  Positive for light-headedness. Negative for dizziness, weakness and headaches. Psychiatric/Behavioral:  Negative for confusion. Objective:   BP (!) 220/92 Comment: 218/97 pt cuff  Pulse 63   Temp 97.9 °F (36.6 °C)   Ht 5' 4\" (1.626 m)   Wt 119 lb (54 kg)   SpO2 96%   BMI 20.43 kg/m²     Physical Exam  Constitutional:       General: She is not in acute distress. Appearance: She is well-developed. She is not diaphoretic. HENT:      Head: Normocephalic and atraumatic. Right Ear: Tympanic membrane, ear canal and external ear normal.      Left Ear: Tympanic membrane, ear canal and external ear normal.      Nose: Nose normal.   Eyes:      General:         Right eye: No discharge. Left eye: No discharge. Conjunctiva/sclera: Conjunctivae normal.      Pupils: Pupils are equal, round, and reactive to light. Neck:      Thyroid: No thyromegaly. Trachea: No tracheal deviation. Cardiovascular:      Rate and Rhythm: Normal rate and regular rhythm. Pulmonary:      Effort: Pulmonary effort is normal. No respiratory distress. Abdominal:      General: There is no distension. Musculoskeletal:      Cervical back: Neck supple.    Skin:     General: Skin is warm and dry. Findings: No bruising or rash. Neurological:      Mental Status: She is alert. Coordination: Coordination normal.   Psychiatric:         Thought Content: Thought content normal.         Judgment: Judgment normal.       No results found for this visit on 11/21/22.     Recent Results (from the past 2016 hour(s))   Comprehensive Metabolic Panel    Collection Time: 11/10/22  1:34 PM   Result Value Ref Range    Sodium 142 135 - 144 mEq/L    Potassium 4.6 3.4 - 4.9 mEq/L    Chloride 103 95 - 107 mEq/L    CO2 26 20 - 31 mEq/L    Anion Gap 13 9 - 15 mEq/L    Glucose 109 (H) 70 - 99 mg/dL    BUN 20 8 - 23 mg/dL    Creatinine 0.89 0.50 - 0.90 mg/dL    Est, Glom Filt Rate >60.0 >60    Calcium 9.4 8.5 - 9.9 mg/dL    Total Protein 8.8 (H) 6.3 - 8.0 g/dL    Albumin 4.4 3.5 - 4.6 g/dL    Total Bilirubin 0.8 (H) 0.2 - 0.7 mg/dL    Alkaline Phosphatase 57 40 - 130 U/L    ALT 14 0 - 33 U/L    AST 32 0 - 35 U/L    Globulin 4.4 (H) 2.3 - 3.5 g/dL   CBC with Auto Differential    Collection Time: 11/10/22  1:34 PM   Result Value Ref Range    WBC 4.6 (L) 4.8 - 10.8 K/uL    RBC 3.75 (L) 4.20 - 5.40 M/uL    Hemoglobin 12.3 12.0 - 16.0 g/dL    Hematocrit 36.0 (L) 37.0 - 47.0 %    MCV 96.0 (H) 79.4 - 94.8 fL    MCH 32.8 (H) 27.0 - 31.3 pg    MCHC 34.2 33.0 - 37.0 %    RDW 13.3 11.5 - 14.5 %    Platelets 652 224 - 560 K/uL    Neutrophils % 68.5 %    Lymphocytes % 20.8 %    Monocytes % 8.8 %    Eosinophils % 1.2 %    Basophils % 0.7 %    Neutrophils Absolute 3.1 1.4 - 6.5 K/uL    Lymphocytes Absolute 1.0 1.0 - 4.8 K/uL    Monocytes Absolute 0.4 0.2 - 0.8 K/uL    Eosinophils Absolute 0.1 0.0 - 0.7 K/uL    Basophils Absolute 0.0 0.0 - 0.2 K/uL   TSH with Reflex    Collection Time: 11/10/22  1:34 PM   Result Value Ref Range    TSH 15.930 (H) 0.440 - 3.860 uIU/mL   T4, Free    Collection Time: 11/10/22  1:34 PM   Result Value Ref Range    T4 Free 1.15 0.84 - 1.68 ng/dL       [] Pt was seen by provider for Minutes  Counseling and coordination of care was done for all assessment diagnosis listed for today with patient and any family/friend present. More than 50% of this visit was spent coordinating current care, obtaining information for prior records, and counseling for current plan of action. Assessment:       Diagnosis Orders   1. Bilateral hearing loss, unspecified hearing loss type        2. Hypertensive urgency        3. Hypothyroidism, unspecified type  TSH with Reflex    levothyroxine (SYNTHROID) 100 MCG tablet      4. Anemia, unspecified type  CBC with Auto Differential    Vitamin B12 & Folate            Orders Placed This Encounter   Procedures    TSH with Reflex     Standing Status:   Future     Standing Expiration Date:   11/21/2023    CBC with Auto Differential     Standing Status:   Future     Standing Expiration Date:   11/21/2023    Vitamin B12 & Folate     Standing Status:   Future     Standing Expiration Date:   11/21/2023       Orders Placed This Encounter   Medications    atorvastatin (LIPITOR) 20 MG tablet     Sig: Take 1 tablet by mouth daily     Dispense:  90 tablet     Refill:  3    levothyroxine (SYNTHROID) 100 MCG tablet     Sig: Take 1 tablet by mouth daily     Dispense:  30 tablet     Refill:  5          Medication List            Accurate as of November 21, 2022  5:52 PM. If you have any questions, ask your nurse or doctor.                 START taking these medications      levothyroxine 100 MCG tablet  Commonly known as: SYNTHROID  Take 1 tablet by mouth daily  Started by: Demarcus Espinal MD            CONTINUE taking these medications      atorvastatin 20 MG tablet  Commonly known as: LIPITOR  Take 1 tablet by mouth daily     carvedilol 25 MG tablet  Commonly known as: COREG  Take 1 tablet by mouth 2 times daily     Flovent HFA 44 MCG/ACT inhaler  Generic drug: fluticasone  INHALE 1 PUFF BY MOUTH TWICE DAILY FOR 90 DAYS.     isosorbide mononitrate 60 MG extended release tablet  Commonly known as: IMDUR  Take 1 tablet by mouth daily     lisinopril 40 MG tablet  Commonly known as: PRINIVIL;ZESTRIL  Take 1 tablet by mouth daily               Where to Get Your Medications        These medications were sent to [facility], [address]  [address], [place] [address] [facility]      Phone: [phone]   atorvastatin 20 MG tablet  levothyroxine 100 MCG tablet           Plan:   Return in about 4 weeks (around 12/19/2022) for for review of outcome of today's recommendation. Patient Instructions   Isosorbide ER 60 mg to be taken at bedtime    Lisinopril can be taken in the morning with carvedilol 1/2 tab (25 mg tab)    Take  carvedilol 1/2 tab  (25 mg tab) at lunch and dinner time. Increase levothyroxine to 100 mcg daily    Blood work did demonstrate a small amount of anemia that looks like there is the possibility for vitamin B12 and folate deficiency. Therefore when patient repeats blood work in 3 to 4 weeks for thyroid we will recheck the anemia and for the possibility of B12 and folate deficiency as well and take action at that time. And is attempting to find someone who will take her insurance for hearing aids. This note was partially created with the assistance of dictation. This may lead to grammatical or spelling errors. Dean Cisse M.D.

## 2022-11-21 NOTE — PATIENT INSTRUCTIONS
Isosorbide ER 60 mg to be taken at bedtime    Lisinopril can be taken in the morning with carvedilol 1/2 tab (25 mg tab)    Take  carvedilol 1/2 tab  (25 mg tab) at lunch and dinner time. Increase levothyroxine to 100 mcg daily    Blood work did demonstrate a small amount of anemia that looks like there is the possibility for vitamin B12 and folate deficiency. Therefore when patient repeats blood work in 3 to 4 weeks for thyroid we will recheck the anemia and for the possibility of B12 and folate deficiency as well and take action at that time. And is attempting to find someone who will take her insurance for hearing aids.

## 2022-11-21 NOTE — PROGRESS NOTES
Diagnosis Orders   1. Hypothyroidism, unspecified type  TSH with Reflex       2. Anemia, unspecified type  CBC with Auto Differential       3. TIN (acute kidney injury) ([address]  lisinopril (PRINIVIL;ZESTRIL) 40 MG tablet     Comprehensive Metabolic Panel       4. Mixed hyperlipidemia          5. Other emphysema ([address]  FLOVENT HFA 44 MCG/ACT inhaler          Return in about 4 weeks (around 11/21/2022) for for review of outcome of today's recommendation. Patient Instructions   Patient is going to restart Flovent inhaler 2 puffs twice a day until seen at follow-up. Refills provided. Patient will restart thyroid medication at 75 mcg as previously taken. Blood work will be done in about 4 weeks. Order created. Dose adjustments will be done at that time if needed. Patient will return to office with home blood pressure monitor to check for accuracy. We will then look at home blood pressure tendency and decide whether we need to change medication at all in the meantime we are refilling her lisinopril. No action on cholesterol at this time. History of anemia will have blood work done when she has a thyroid done to follow current status.

## 2022-11-28 RX ORDER — ATORVASTATIN CALCIUM 20 MG/1
20 TABLET, FILM COATED ORAL DAILY
Qty: 90 TABLET | Refills: 3 | OUTPATIENT
Start: 2022-11-28

## 2022-12-19 ENCOUNTER — OFFICE VISIT (OUTPATIENT)
Dept: FAMILY MEDICINE CLINIC | Age: 82
End: 2022-12-19
Payer: COMMERCIAL

## 2022-12-19 VITALS
OXYGEN SATURATION: 97 % | HEIGHT: 64 IN | SYSTOLIC BLOOD PRESSURE: 190 MMHG | WEIGHT: 118 LBS | TEMPERATURE: 98.2 F | HEART RATE: 71 BPM | BODY MASS INDEX: 20.14 KG/M2 | DIASTOLIC BLOOD PRESSURE: 92 MMHG

## 2022-12-19 DIAGNOSIS — E03.9 HYPOTHYROIDISM, UNSPECIFIED TYPE: ICD-10-CM

## 2022-12-19 DIAGNOSIS — D64.9 ANEMIA, UNSPECIFIED TYPE: ICD-10-CM

## 2022-12-19 DIAGNOSIS — I10 UNCONTROLLED HYPERTENSION: Primary | ICD-10-CM

## 2022-12-19 LAB
BASOPHILS ABSOLUTE: 0 K/UL (ref 0–0.2)
BASOPHILS RELATIVE PERCENT: 0.3 %
EOSINOPHILS ABSOLUTE: 0.1 K/UL (ref 0–0.7)
EOSINOPHILS RELATIVE PERCENT: 1.4 %
HCT VFR BLD CALC: 34.2 % (ref 37–47)
HEMOGLOBIN: 11.8 G/DL (ref 12–16)
LYMPHOCYTES ABSOLUTE: 0.8 K/UL (ref 1–4.8)
LYMPHOCYTES RELATIVE PERCENT: 18.5 %
MCH RBC QN AUTO: 33.4 PG (ref 27–31.3)
MCHC RBC AUTO-ENTMCNC: 34.5 % (ref 33–37)
MCV RBC AUTO: 97 FL (ref 79.4–94.8)
MONOCYTES ABSOLUTE: 0.5 K/UL (ref 0.2–0.8)
MONOCYTES RELATIVE PERCENT: 10.7 %
NEUTROPHILS ABSOLUTE: 3.2 K/UL (ref 1.4–6.5)
NEUTROPHILS RELATIVE PERCENT: 69.1 %
PDW BLD-RTO: 12.8 % (ref 11.5–14.5)
PLATELET # BLD: 215 K/UL (ref 130–400)
RBC # BLD: 3.53 M/UL (ref 4.2–5.4)
T4 FREE: 1.56 NG/DL (ref 0.84–1.68)
TSH REFLEX: 4.82 UIU/ML (ref 0.44–3.86)
WBC # BLD: 4.6 K/UL (ref 4.8–10.8)

## 2022-12-19 PROCEDURE — 3078F DIAST BP <80 MM HG: CPT | Performed by: FAMILY MEDICINE

## 2022-12-19 PROCEDURE — 1123F ACP DISCUSS/DSCN MKR DOCD: CPT | Performed by: FAMILY MEDICINE

## 2022-12-19 PROCEDURE — 3074F SYST BP LT 130 MM HG: CPT | Performed by: FAMILY MEDICINE

## 2022-12-19 PROCEDURE — 99214 OFFICE O/P EST MOD 30 MIN: CPT | Performed by: FAMILY MEDICINE

## 2022-12-19 ASSESSMENT — ENCOUNTER SYMPTOMS
ABDOMINAL PAIN: 0
PHOTOPHOBIA: 0
CHEST TIGHTNESS: 0
ABDOMINAL DISTENTION: 0
SHORTNESS OF BREATH: 0

## 2022-12-19 NOTE — PATIENT INSTRUCTIONS
Here blood pressure still appears uncontrolled but patient reports sounds much better. Would like to proceed with treatment based on what her everyday life measurements are. Therefore pt will log bp and times of day in order to evaluate for need for medication change. Labs are drawn and waiting for results to manage meds.

## 2022-12-19 NOTE — PROGRESS NOTES
Diagnosis Orders   1. Bilateral hearing loss, unspecified hearing loss type          2. Hypertensive urgency          3. Hypothyroidism, unspecified type  TSH with Reflex     levothyroxine (SYNTHROID) 100 MCG tablet       4. Anemia, unspecified type  CBC with Auto Differential     Vitamin B12 & Folate          Return in about 4 weeks (around 12/19/2022) for for review of outcome of today's recommendation. Patient Instructions   Isosorbide ER 60 mg to be taken at bedtime     Lisinopril can be taken in the morning with carvedilol 1/2 tab (25 mg tab)     Take  carvedilol 1/2 tab  (25 mg tab) at lunch and dinner time. Increase levothyroxine to 100 mcg daily     Blood work did demonstrate a small amount of anemia that looks like there is the possibility for vitamin B12 and folate deficiency. Therefore when patient repeats blood work in 3 to 4 weeks for thyroid we will recheck the anemia and for the possibility of B12 and folate deficiency as well and take action at that time. And is attempting to find someone who will take her insurance for hearing aids.

## 2022-12-19 NOTE — PROGRESS NOTES
Diagnosis Orders   1. Uncontrolled hypertension        2. Hypothyroidism, unspecified type          Return in about 4 weeks (around 1/16/2023) for for review of outcome of today's recommendation. Patient Instructions   Here blood pressure still appears uncontrolled but patient reports sounds much better. Would like to proceed with treatment based on what her everyday life measurements are. Therefore pt will log bp and times of day in order to evaluate for need for medication change. Labs are drawn and waiting for results to manage meds. Subjective:      Patient ID: Sania Stevenson is a 81 y.o. female who presents for:  Chief Complaint   Patient presents with    Discuss Medications     X 1 month follow up        Pt and family member state her home bp are running [id number] systolic on the machine that was verified as accurate. See ROS    Just had labs drawn      Current Outpatient Medications on File Prior to Visit   Medication Sig Dispense Refill    atorvastatin (LIPITOR) 20 MG tablet Take 1 tablet by mouth daily 90 tablet 3    levothyroxine (SYNTHROID) 100 MCG tablet Take 1 tablet by mouth daily 30 tablet 5    FLOVENT HFA 44 MCG/ACT inhaler INHALE 1 PUFF BY MOUTH TWICE DAILY FOR 90 DAYS. 1 each 11    lisinopril (PRINIVIL;ZESTRIL) 40 MG tablet Take 1 tablet by mouth daily 90 tablet 2    carvedilol (COREG) 25 MG tablet Take 1 tablet by mouth 2 times daily 180 tablet 3    isosorbide mononitrate (IMDUR) 60 MG extended release tablet Take 1 tablet by mouth daily 90 tablet 3     No current facility-administered medications on file prior to visit.      Past Medical History:   Diagnosis Date    Allergic rhinitis     Asthma     uses flovent    Hearing loss     Hyperlipidemia     Hypertension     Hypothyroidism     Lichen planus     Dr. Mary Bower     Past Surgical History:   Procedure Laterality Date    HYSTERECTOMY, TOTAL ABDOMINAL (CERVIX REMOVED)      TONSILLECTOMY       Social History     Socioeconomic History Marital status: Single     Spouse name: Not on file    Number of children: 2    Years of education: Not on file    Highest education level: Not on file   Occupational History    Not on file   Tobacco Use    Smoking status: Never    Smokeless tobacco: Never   Vaping Use    Vaping Use: Never used   Substance and Sexual Activity    Alcohol use: No    Drug use: No    Sexual activity: Not Currently   Other Topics Concern    Not on file   Social History Narrative    Has 2 sons. One of her sons lives with her in a mobile home. Social Determinants of Health     Financial Resource Strain: Low Risk     Difficulty of Paying Living Expenses: Not hard at all   Food Insecurity: No Food Insecurity    Worried About Running Out of Food in the Last Year: Never true    Ran Out of Food in the Last Year: Never true   Transportation Needs: Not on file   Physical Activity: Not on file   Stress: Not on file   Social Connections: Not on file   Intimate Partner Violence: Not on file   Housing Stability: Not on file     Family History   Problem Relation Age of Onset    High Blood Pressure Mother      Allergies:  Albuterol, Amlodipine, Hydralazine, Metformin and related, and Zoster vac recomb adjuvanted    Review of Systems   Constitutional:  Negative for activity change, appetite change, diaphoresis and unexpected weight change. Eyes:  Negative for photophobia and visual disturbance. Respiratory:  Negative for chest tightness and shortness of breath. No orthopnea   Cardiovascular:  Negative for chest pain, palpitations and leg swelling. Gastrointestinal:  Negative for abdominal distention and abdominal pain. Genitourinary:  Negative for flank pain and frequency. Musculoskeletal:  Negative for gait problem and joint swelling. Neurological:  Negative for dizziness, weakness, light-headedness and headaches. Psychiatric/Behavioral:  Negative for confusion.       Objective:   BP (!) 190/92   Pulse 71   Temp 98.2 °F (36.8 °C)   Ht 5' 4\" (1.626 m)   Wt 118 lb (53.5 kg)   SpO2 97%   BMI 20.25 kg/m²     Physical Exam  Constitutional:       General: She is not in acute distress. Appearance: She is well-developed. She is not diaphoretic. HENT:      Head: Normocephalic and atraumatic. Right Ear: External ear normal.      Left Ear: External ear normal.      Nose: Nose normal.   Eyes:      General:         Right eye: No discharge. Left eye: No discharge. Conjunctiva/sclera: Conjunctivae normal.      Pupils: Pupils are equal, round, and reactive to light. Neck:      Thyroid: No thyromegaly. Trachea: No tracheal deviation. Cardiovascular:      Rate and Rhythm: Normal rate and regular rhythm. Pulmonary:      Effort: Pulmonary effort is normal. No respiratory distress. Abdominal:      General: There is no distension. Musculoskeletal:      Cervical back: Neck supple. Skin:     General: Skin is warm and dry. Findings: No bruising or rash. Neurological:      Mental Status: She is alert. Coordination: Coordination normal.   Psychiatric:         Thought Content: Thought content normal.         Judgment: Judgment normal.       No results found for this visit on 12/19/22.     Recent Results (from the past 2016 hour(s))   Comprehensive Metabolic Panel    Collection Time: 11/10/22  1:34 PM   Result Value Ref Range    Sodium 142 135 - 144 mEq/L    Potassium 4.6 3.4 - 4.9 mEq/L    Chloride 103 95 - 107 mEq/L    CO2 26 20 - 31 mEq/L    Anion Gap 13 9 - 15 mEq/L    Glucose 109 (H) 70 - 99 mg/dL    BUN 20 8 - 23 mg/dL    Creatinine 0.89 0.50 - 0.90 mg/dL    Est, Glom Filt Rate >60.0 >60    Calcium 9.4 8.5 - 9.9 mg/dL    Total Protein 8.8 (H) 6.3 - 8.0 g/dL    Albumin 4.4 3.5 - 4.6 g/dL    Total Bilirubin 0.8 (H) 0.2 - 0.7 mg/dL    Alkaline Phosphatase 57 40 - 130 U/L    ALT 14 0 - 33 U/L    AST 32 0 - 35 U/L    Globulin 4.4 (H) 2.3 - 3.5 g/dL   CBC with Auto Differential    Collection Time: 11/10/22 1:34 PM   Result Value Ref Range    WBC 4.6 (L) 4.8 - 10.8 K/uL    RBC 3.75 (L) 4.20 - 5.40 M/uL    Hemoglobin 12.3 12.0 - 16.0 g/dL    Hematocrit 36.0 (L) 37.0 - 47.0 %    MCV 96.0 (H) 79.4 - 94.8 fL    MCH 32.8 (H) 27.0 - 31.3 pg    MCHC 34.2 33.0 - 37.0 %    RDW 13.3 11.5 - 14.5 %    Platelets 347 262 - 184 K/uL    Neutrophils % 68.5 %    Lymphocytes % 20.8 %    Monocytes % 8.8 %    Eosinophils % 1.2 %    Basophils % 0.7 %    Neutrophils Absolute 3.1 1.4 - 6.5 K/uL    Lymphocytes Absolute 1.0 1.0 - 4.8 K/uL    Monocytes Absolute 0.4 0.2 - 0.8 K/uL    Eosinophils Absolute 0.1 0.0 - 0.7 K/uL    Basophils Absolute 0.0 0.0 - 0.2 K/uL   TSH with Reflex    Collection Time: 11/10/22  1:34 PM   Result Value Ref Range    TSH 15.930 (H) 0.440 - 3.860 uIU/mL   T4, Free    Collection Time: 11/10/22  1:34 PM   Result Value Ref Range    T4 Free 1.15 0.84 - 1.68 ng/dL       [] Pt was seen by provider for      Minutes  Counseling and coordination of care was done for all assessment diagnosis listed for today with patient and any family/friend present. More than 50% of this visit was spent coordinating current care, obtaining information for prior records, and counseling for current plan of action. Assessment:       Diagnosis Orders   1. Uncontrolled hypertension        2. Hypothyroidism, unspecified type              No orders of the defined types were placed in this encounter. No orders of the defined types were placed in this encounter. Medication List            Accurate as of December 19, 2022  5:26 PM. If you have any questions, ask your nurse or doctor. CONTINUE taking these medications      atorvastatin 20 MG tablet  Commonly known as: LIPITOR  Take 1 tablet by mouth daily     carvedilol 25 MG tablet  Commonly known as: COREG  Take 1 tablet by mouth 2 times daily     Flovent HFA 44 MCG/ACT inhaler  Generic drug: fluticasone  INHALE 1 PUFF BY MOUTH TWICE DAILY FOR 90 DAYS. isosorbide mononitrate 60 MG extended release tablet  Commonly known as: IMDUR  Take 1 tablet by mouth daily     levothyroxine 100 MCG tablet  Commonly known as: SYNTHROID  Take 1 tablet by mouth daily     lisinopril 40 MG tablet  Commonly known as: PRINIVIL;ZESTRIL  Take 1 tablet by mouth daily                Plan:   Return in about 4 weeks (around 1/16/2023) for for review of outcome of today's recommendation. Patient Instructions   Here blood pressure still appears uncontrolled but patient reports sounds much better. Would like to proceed with treatment based on what her everyday life measurements are. Therefore pt will log bp and times of day in order to evaluate for need for medication change. Labs are drawn and waiting for results to manage meds. This note was partially created with the assistance of dictation. This may lead to grammatical or spelling errors. Dean Patricia M.D.

## 2022-12-20 DIAGNOSIS — E03.9 HYPOTHYROIDISM, UNSPECIFIED TYPE: ICD-10-CM

## 2022-12-20 LAB
FOLATE: >20 NG/ML
VITAMIN B-12: 1059 PG/ML (ref 232–1245)

## 2022-12-20 RX ORDER — LEVOTHYROXINE SODIUM 112 UG/1
112 TABLET ORAL DAILY
Qty: 30 TABLET | Refills: 1 | Status: SHIPPED | OUTPATIENT
Start: 2022-12-20

## 2022-12-20 NOTE — RESULT ENCOUNTER NOTE
Labs are normalizing. I am going to increase her thyroid medication dose a little bit more. Creating an order to repeat TSH in 4 weeks.   Notify patient

## 2023-01-17 DIAGNOSIS — N17.9 AKI (ACUTE KIDNEY INJURY) (HCC): ICD-10-CM

## 2023-01-17 NOTE — TELEPHONE ENCOUNTER
Pt is out of med. Pt called and needs a short script until appt next Wednesday. Doesn't know if provider was going to change MG of med. Comments:     Last Office Visit (last PCP visit):   12/19/2022    Next Visit Date:  Future Appointments   Date Time Provider Junaid Light   1/25/2023  3:30 PM Reji Sawyer MD [facility] EMERGENCY [facility] AT [place]       **If hasn't been seen in over a year OR hasn't followed up according to last diabetes/ADHD visit, make appointment for patient before sending refill to provider.     Rx requested:  Requested Prescriptions     Pending Prescriptions Disp Refills    lisinopril (PRINIVIL;ZESTRIL) 40 MG tablet 90 tablet 2     Sig: Take 1 tablet by mouth daily

## 2023-01-18 RX ORDER — LISINOPRIL 40 MG/1
40 TABLET ORAL DAILY
Qty: 90 TABLET | Refills: 2 | Status: SHIPPED | OUTPATIENT
Start: 2023-01-18

## 2023-02-09 ENCOUNTER — OFFICE VISIT (OUTPATIENT)
Dept: FAMILY MEDICINE CLINIC | Age: 83
End: 2023-02-09
Payer: COMMERCIAL

## 2023-02-09 VITALS
HEART RATE: 72 BPM | TEMPERATURE: 97.8 F | WEIGHT: 117 LBS | BODY MASS INDEX: 19.97 KG/M2 | OXYGEN SATURATION: 98 % | HEIGHT: 64 IN | SYSTOLIC BLOOD PRESSURE: 180 MMHG | DIASTOLIC BLOOD PRESSURE: 82 MMHG

## 2023-02-09 DIAGNOSIS — E03.9 HYPOTHYROIDISM, UNSPECIFIED TYPE: ICD-10-CM

## 2023-02-09 DIAGNOSIS — L30.9 DERMATITIS: ICD-10-CM

## 2023-02-09 DIAGNOSIS — B88.0 INFESTATION BY DEMODEX FOLLICULORUM: ICD-10-CM

## 2023-02-09 DIAGNOSIS — I10 UNCONTROLLED HYPERTENSION: Primary | ICD-10-CM

## 2023-02-09 PROCEDURE — 1123F ACP DISCUSS/DSCN MKR DOCD: CPT | Performed by: FAMILY MEDICINE

## 2023-02-09 PROCEDURE — 3077F SYST BP >= 140 MM HG: CPT | Performed by: FAMILY MEDICINE

## 2023-02-09 PROCEDURE — 3079F DIAST BP 80-89 MM HG: CPT | Performed by: FAMILY MEDICINE

## 2023-02-09 PROCEDURE — 99214 OFFICE O/P EST MOD 30 MIN: CPT | Performed by: FAMILY MEDICINE

## 2023-02-09 RX ORDER — LISINOPRIL 20 MG/1
TABLET ORAL
COMMUNITY
Start: 2022-12-12 | End: 2023-02-09 | Stop reason: DRUGHIGH

## 2023-02-09 RX ORDER — TRIAMCINOLONE ACETONIDE 1 MG/G
CREAM TOPICAL
Qty: 454 G | Refills: 0 | Status: SHIPPED | OUTPATIENT
Start: 2023-02-09

## 2023-02-09 RX ORDER — LEVOTHYROXINE SODIUM 0.1 MG/1
TABLET ORAL
Qty: 30 TABLET | Refills: 2 | Status: SHIPPED | OUTPATIENT
Start: 2023-02-09

## 2023-02-09 RX ORDER — IVERMECTIN 3 MG/1
200 TABLET ORAL ONCE
Qty: 3.5 TABLET | Refills: 0 | Status: SHIPPED | OUTPATIENT
Start: 2023-02-09 | End: 2023-02-09

## 2023-02-09 RX ORDER — LEVOTHYROXINE SODIUM 0.1 MG/1
TABLET ORAL
COMMUNITY
Start: 2023-01-28 | End: 2023-02-09 | Stop reason: SDUPTHER

## 2023-02-09 SDOH — ECONOMIC STABILITY: FOOD INSECURITY: WITHIN THE PAST 12 MONTHS, YOU WORRIED THAT YOUR FOOD WOULD RUN OUT BEFORE YOU GOT MONEY TO BUY MORE.: NEVER TRUE

## 2023-02-09 SDOH — ECONOMIC STABILITY: FOOD INSECURITY: WITHIN THE PAST 12 MONTHS, THE FOOD YOU BOUGHT JUST DIDN'T LAST AND YOU DIDN'T HAVE MONEY TO GET MORE.: NEVER TRUE

## 2023-02-09 SDOH — ECONOMIC STABILITY: INCOME INSECURITY: HOW HARD IS IT FOR YOU TO PAY FOR THE VERY BASICS LIKE FOOD, HOUSING, MEDICAL CARE, AND HEATING?: NOT HARD AT ALL

## 2023-02-09 SDOH — ECONOMIC STABILITY: HOUSING INSECURITY
IN THE LAST 12 MONTHS, WAS THERE A TIME WHEN YOU DID NOT HAVE A STEADY PLACE TO SLEEP OR SLEPT IN A SHELTER (INCLUDING NOW)?: NO

## 2023-02-09 ASSESSMENT — ENCOUNTER SYMPTOMS
ABDOMINAL DISTENTION: 0
PHOTOPHOBIA: 0
ABDOMINAL PAIN: 0
CHEST TIGHTNESS: 0
SHORTNESS OF BREATH: 0

## 2023-02-09 ASSESSMENT — PATIENT HEALTH QUESTIONNAIRE - PHQ9
1. LITTLE INTEREST OR PLEASURE IN DOING THINGS: 0
SUM OF ALL RESPONSES TO PHQ QUESTIONS 1-9: 0
2. FEELING DOWN, DEPRESSED OR HOPELESS: 0
SUM OF ALL RESPONSES TO PHQ QUESTIONS 1-9: 0
SUM OF ALL RESPONSES TO PHQ9 QUESTIONS 1 & 2: 0

## 2023-02-09 NOTE — PROGRESS NOTES
Diagnosis Orders   1. Uncontrolled hypertension  levothyroxine (SYNTHROID) 100 MCG tablet    triamcinolone (KENALOG) 0.1 % cream    Glenn Rueda DO, Invasive Cardiology, [place]      2. Dermatitis        3. Hypothyroidism, unspecified type        4. 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 Patient Instructions   Patient will be seeing cardiology for uncontrolled hypertension. She does have thyroid abnormalities and potential further evaluation for causes be necessary however she is also sensitive to medications. Patient is itching and open rash will give steroid cream to help with symptoms and ivermectin to treat likely Demodex folliculitis. Patient was unable to tolerate 112 mcg of levothyroxine she is returned to 100 and doing well. We will leave that alone and recheck the level today    Subjective:      Patient ID: Mt Murray is a 81 y.o. female who presents for:  Chief Complaint   Patient presents with    Hypertension     X 2 month follow up        Patient continues to have difficulty with controlling her blood pressure. However at times when she takes medication she ends up having episodes where she feels hypotensive and dizzy. She is on triple therapy at this time. Today's measurements remain elevated. Having difficulty with the abnormal sensation in her ear. Denies chest pain or syncope. Patient is noticing increased itching. Open lesions on her skin. Keeps her awake at night. Only in areas of her body that she is able to reach. No spontaneous itching or open lesions where hands cannot scratch. Patient is noting difficulty tolerating levothyroxine 112 mcg with increasing heart rate and palpitations. She stopped that and went back to the 100s.       Current Outpatient Medications on File Prior to Visit   Medication Sig Dispense Refill    lisinopril (PRINIVIL;ZESTRIL) 40 MG tablet Take 1 tablet by mouth daily 90 tablet 2    atorvastatin (LIPITOR) 20 MG tablet Take 1 tablet by mouth daily 90 tablet 3    FLOVENT HFA 44 MCG/ACT inhaler INHALE 1 PUFF BY MOUTH TWICE DAILY FOR 90 DAYS. 1 each 11    carvedilol (COREG) 25 MG tablet Take 1 tablet by mouth 2 times daily 180 tablet 3    isosorbide mononitrate (IMDUR) 60 MG extended release tablet Take 1 tablet by mouth daily 90 tablet 3     No current facility-administered medications on file prior to visit. Past Medical History:   Diagnosis Date    Allergic rhinitis     Asthma     uses flovent    Hearing loss     Hyperlipidemia     Hypertension     Hypothyroidism     Lichen planus     Dr. Wolf     Past Surgical History:   Procedure Laterality Date    HYSTERECTOMY, TOTAL ABDOMINAL (CERVIX REMOVED)      TONSILLECTOMY       Social History     Socioeconomic History    Marital status: Single     Spouse name: Not on file    Number of children: 2    Years of education: Not on file    Highest education level: Not on file   Occupational History    Not on file   Tobacco Use    Smoking status: Never    Smokeless tobacco: Never   Vaping Use    Vaping Use: Never used   Substance and Sexual Activity    Alcohol use: No    Drug use: No    Sexual activity: Not Currently   Other Topics Concern    Not on file   Social History Narrative    Has 2 sons. One of her sons lives with her in a mobile home. Social Determinants of Health     Financial Resource Strain: Low Risk     Difficulty of Paying Living Expenses: Not hard at all   Food Insecurity: No Food Insecurity    Worried About [address] in the Last Year: Never true    [address] in the Last Year: Never true   Transportation Needs: Unknown    Lack of Transportation (Medical): Not on file    Lack of Transportation (Non-Medical):  No   Physical Activity: Not on file   Stress: Not on file   Social Connections: Not on file   Intimate Partner Violence: Not on file   Housing Stability: Unknown    Unable to Pay for Housing in the Last Year: Not on file    Number of Places Lived in the Last Year: Not on file    Unstable Housing in the Last Year: No     Family History   Problem Relation Age of Onset    High Blood Pressure Mother      Allergies:  Albuterol, Amlodipine, Hydralazine, Metformin and related, and Zoster vac recomb adjuvanted    Review of Systems   Constitutional:  Negative for activity change, appetite change, diaphoresis and unexpected weight change. Eyes:  Negative for photophobia and visual disturbance. Respiratory:  Negative for chest tightness and shortness of breath. No orthopnea   Cardiovascular:  Positive for palpitations. Negative for chest pain and leg swelling. Gastrointestinal:  Negative for abdominal distention and abdominal pain. Genitourinary:  Negative for flank pain and frequency. Musculoskeletal:  Negative for gait problem and joint swelling. Skin:  Positive for rash and wound. Neurological:  Negative for dizziness, weakness, light-headedness and headaches. Psychiatric/Behavioral:  Negative for confusion. Objective:   BP (!) 180/82   Pulse 72   Temp 97.8 °F (36.6 °C)   Ht 5' 4\" (1.626 m)   Wt 117 lb (53.1 kg)   SpO2 98%   BMI 20.08 kg/m²     Physical Exam  Constitutional:       General: She is not in acute distress. Appearance: She is well-developed. She is not diaphoretic. HENT:      Head: Normocephalic and atraumatic. Right Ear: External ear normal.      Left Ear: External ear normal.      Nose: Nose normal.   Eyes:      General:         Right eye: No discharge. Left eye: No discharge. Conjunctiva/sclera: Conjunctivae normal.      Pupils: Pupils are equal, round, and reactive to light. Neck:      Thyroid: No thyromegaly. Trachea: No tracheal deviation. Cardiovascular:      Rate and Rhythm: Normal rate and regular rhythm. Pulmonary:      Effort: Pulmonary effort is normal. No respiratory distress.    Abdominal:      General: There is no distension.   Musculoskeletal:      Cervical back: Neck supple.   Skin:     General: Skin is warm and dry.      Findings: No bruising or rash.      Comments: Excoriations circular indurated erythematous lesions noted on the upper posterior shoulders, forearms, anterior shoulders, upper legs   Neurological:      Mental Status: She is alert.      Coordination: Coordination normal.   Psychiatric:         Thought Content: Thought content normal.         Judgment: Judgment normal.       No results found for this visit on 02/09/23.    Recent Results (from the past 2016 hour(s))   Vitamin B12 & Folate    Collection Time: 12/19/22  3:34 PM   Result Value Ref Range    Vitamin B-12 1059 232 - 1245 pg/mL    Folate >20.0 >4.8 ng/mL   CBC with Auto Differential    Collection Time: 12/19/22  3:34 PM   Result Value Ref Range    WBC 4.6 (L) 4.8 - 10.8 K/uL    RBC 3.53 (L) 4.20 - 5.40 M/uL    Hemoglobin 11.8 (L) 12.0 - 16.0 g/dL    Hematocrit 34.2 (L) 37.0 - 47.0 %    MCV 97.0 (H) 79.4 - 94.8 fL    MCH 33.4 (H) 27.0 - 31.3 pg    MCHC 34.5 33.0 - 37.0 %    RDW 12.8 11.5 - 14.5 %    Platelets 215 130 - 400 K/uL    Neutrophils % 69.1 %    Lymphocytes % 18.5 %    Monocytes % 10.7 %    Eosinophils % 1.4 %    Basophils % 0.3 %    Neutrophils Absolute 3.2 1.4 - 6.5 K/uL    Lymphocytes Absolute 0.8 (L) 1.0 - 4.8 K/uL    Monocytes Absolute 0.5 0.2 - 0.8 K/uL    Eosinophils Absolute 0.1 0.0 - 0.7 K/uL    Basophils Absolute 0.0 0.0 - 0.2 K/uL   TSH with Reflex    Collection Time: 12/19/22  3:34 PM   Result Value Ref Range    TSH 4.820 (H) 0.440 - 3.860 uIU/mL   T4, Free    Collection Time: 12/19/22  3:34 PM   Result Value Ref Range    T4 Free 1.56 0.84 - 1.68 ng/dL       [] Pt was seen by provider for      Minutes  Counseling and coordination of care was done for all assessment diagnosis listed for today with patient and any family/friend present.   More than 50% of this visit was spent coordinating current care, obtaining information for  prior records, and counseling for current plan of action.           Assessment:       Diagnosis Orders   1. Uncontrolled hypertension  levothyroxine (SYNTHROID) 100 MCG tablet    triamcinolone (KENALOG) 0.1 % cream    nAkit Davis DO, Invasive Cardiology, [place]      2. Dermatitis        3. Hypothyroidism, unspecified type        4. 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  ivermectin 3 MG tablet            Orders Placed This Encounter   Procedures    Ankit Davis DO, Invasive Cardiology, [place]     Referral Priority:   Urgent     Referral Type:   Eval and Treat     Referral Reason:   Specialty Services Required     Referred to Provider:   Ankit Gross DO     Requested Specialty:   Cardiology     Number of Visits Requested:   1       Orders Placed This Encounter   Medications    levothyroxine (SYNTHROID) 100 MCG tablet     Sig: take 1 tablet by mouth once daily     Dispense:  30 tablet     Refill:  2    triamcinolone (KENALOG) 0.1 % cream     Sig: Apply topically 2 times daily.     Dispense:  454 g     Refill:  0    ivermectin 3 MG tablet     Sig: Take 3.5 tablets by mouth once for 1 dose     Dispense:  3.5 tablet     Refill:  0            Medication List            Accurate as of February 9, 2023  3:24 PM. If you have any questions, ask your nurse or doctor.                START taking these medications      ivermectin 3 MG tablet  Take 3.5 tablets by mouth once for 1 dose  Started by: Dean Blum MD     triamcinolone 0.1 % cream  Commonly known as: KENALOG  Apply topically 2 times daily.  Started by: Dean Blum MD            CONTINUE taking these medications      atorvastatin 20 MG tablet  Commonly known as: LIPITOR  Take 1 tablet by mouth daily     carvedilol 25 MG tablet  Commonly known as: COREG  Take 1 tablet by mouth 2 times daily     Flovent HFA 44 MCG/ACT inhaler  Generic drug: fluticasone  INHALE 1 PUFF BY MOUTH TWICE DAILY FOR 90 DAYS.     isosorbide mononitrate 60 MG extended  release tablet  Commonly known as: IMDUR  Take 1 tablet by mouth daily     levothyroxine 100 MCG tablet  Commonly known as: SYNTHROID  take 1 tablet by mouth once daily     lisinopril 40 MG tablet  Commonly known as: PRINIVIL;ZESTRIL  Take 1 tablet by mouth daily               Where to Get Your Medications        These medications were [facility] to [facility] [phone] - F [phone]  [address] [facility] [place] [address]      Phone: [phone]   ivermectin 3 MG tablet  levothyroxine 100 MCG tablet  triamcinolone 0.1 % cream           Plan:   Return for with specialist urgent appt . Patient Instructions   Patient will be seeing cardiology for uncontrolled hypertension. She does have thyroid abnormalities and potential further evaluation for causes be necessary however she is also sensitive to medications. Patient is itching and open rash will give steroid cream to help with symptoms and ivermectin to treat likely Demodex folliculitis. Patient was unable to tolerate 112 mcg of levothyroxine she is returned to 100 and doing well. We will leave that alone and recheck the level today    This note was partially created with the assistance of dictation. This may lead to grammatical or spelling errors. Dean Nguyen M.D.

## 2023-02-09 NOTE — PROGRESS NOTES
Diagnosis Orders    1. Uncontrolled hypertension          2. Hypothyroidism, unspecified type             Return in about 4 weeks (around 1/16/2023) for for review of outcome of today's recommendation. Patient Instructions   Here blood pressure still appears uncontrolled but patient reports sounds much better. Would like to proceed with treatment based on what her everyday life measurements are. Therefore pt will log bp and times of day in order to evaluate for need for medication change. Labs are drawn and waiting for results to manage meds.

## 2023-02-09 NOTE — PATIENT INSTRUCTIONS
Patient will be seeing cardiology for uncontrolled hypertension. She does have thyroid abnormalities and potential further evaluation for causes be necessary however she is also sensitive to medications. Patient is itching and open rash will give steroid cream to help with symptoms and ivermectin to treat likely Demodex folliculitis. Patient was unable to tolerate 112 mcg of levothyroxine she is returned to 100 and doing well.   We will leave that alone and recheck the level today

## 2023-02-27 ENCOUNTER — TELEPHONE (OUTPATIENT)
Dept: FAMILY MEDICINE CLINIC | Age: 83
End: 2023-02-27

## 2023-03-03 ENCOUNTER — TELEPHONE (OUTPATIENT)
Dept: FAMILY MEDICINE CLINIC | Age: 83
End: 2023-03-03

## 2023-03-03 NOTE — TELEPHONE ENCOUNTER
LM   I spoke with pharmacy who states that patient was able to  script. LM with patient to call back if there is anything further we can assist with.

## 2023-03-03 NOTE — TELEPHONE ENCOUNTER
Pt son came in saying the pharm is saying to Ivermectin still isn't covered. Pt pharm [facility].     Pt # [phone]

## 2023-03-03 NOTE — TELEPHONE ENCOUNTER
Narendra [address]  Invasive Cardiology  - [place], [address] [place], [place] [id number] [address] [facility] [address] [facility]: MARTÍN/Dean Yates: [phone]    Referral is for DR holiday- let patient know they would call that office and just schedule with Dr pederson instead of dr Susie Mendoza.

## 2023-03-29 DIAGNOSIS — E78.5 DYSLIPIDEMIA: Primary | ICD-10-CM

## 2023-03-30 RX ORDER — ISOSORBIDE MONONITRATE 60 MG/1
TABLET, EXTENDED RELEASE ORAL
Qty: 90 TABLET | Refills: 3 | OUTPATIENT
Start: 2023-03-30

## 2023-03-31 ENCOUNTER — OFFICE VISIT (OUTPATIENT)
Dept: CARDIOLOGY CLINIC | Age: 83
End: 2023-03-31
Payer: COMMERCIAL

## 2023-03-31 VITALS
DIASTOLIC BLOOD PRESSURE: 110 MMHG | WEIGHT: 115.8 LBS | SYSTOLIC BLOOD PRESSURE: 210 MMHG | BODY MASS INDEX: 19.77 KG/M2 | HEART RATE: 78 BPM | OXYGEN SATURATION: 95 % | HEIGHT: 64 IN

## 2023-03-31 DIAGNOSIS — R94.31 ABNORMAL EKG: ICD-10-CM

## 2023-03-31 DIAGNOSIS — I10 PRIMARY HYPERTENSION: Primary | ICD-10-CM

## 2023-03-31 PROCEDURE — 99214 OFFICE O/P EST MOD 30 MIN: CPT | Performed by: INTERNAL MEDICINE

## 2023-03-31 PROCEDURE — 3077F SYST BP >= 140 MM HG: CPT | Performed by: INTERNAL MEDICINE

## 2023-03-31 PROCEDURE — 1123F ACP DISCUSS/DSCN MKR DOCD: CPT | Performed by: INTERNAL MEDICINE

## 2023-03-31 PROCEDURE — 93000 ELECTROCARDIOGRAM COMPLETE: CPT | Performed by: INTERNAL MEDICINE

## 2023-03-31 PROCEDURE — 3080F DIAST BP >= 90 MM HG: CPT | Performed by: INTERNAL MEDICINE

## 2023-03-31 ASSESSMENT — ENCOUNTER SYMPTOMS
COUGH: 0
GASTROINTESTINAL NEGATIVE: 1
BLOOD IN STOOL: 0
NAUSEA: 0
SHORTNESS OF BREATH: 0
CHEST TIGHTNESS: 0
STRIDOR: 0
RESPIRATORY NEGATIVE: 1
EYES NEGATIVE: 1
WHEEZING: 0

## 2023-03-31 NOTE — PROGRESS NOTES
60 as well as lisinopril 40. Patient was advised and encouraged to check blood pressure at home or at a pharmacy, maintain a logbook, and also call us back if blood pressure are above the target ranges or if it is low. Patient clearly understands and agrees to the instructions. She is to bring in exactly which medication she is taken at home. Check stress test    2. Dyslipidemia  Continue statin. Low fat diet    Counseling:  Heart Healthy Lifestyle, Low Salt Diet, Take Precautions to Prevent Falls and Walk Daily    No follow-ups on file.     Electronically signed by Poonam Guido DO on 3/31/2023 at 11:54 AM

## 2023-04-03 DIAGNOSIS — I16.0 HYPERTENSIVE URGENCY: Primary | ICD-10-CM

## 2023-04-04 RX ORDER — ISOSORBIDE MONONITRATE 60 MG/1
TABLET, EXTENDED RELEASE ORAL
Qty: 90 TABLET | Refills: 3 | Status: SHIPPED | OUTPATIENT
Start: 2023-04-04

## 2023-04-04 NOTE — TELEPHONE ENCOUNTER
Requesting medication refill. Please approve or deny this request.    Rx requested:  Requested Prescriptions     Pending Prescriptions Disp Refills    isosorbide mononitrate (IMDUR) 60 MG extended release tablet [Pharmacy Med Name: [facility] ER 60 MG TB] 90 tablet 3     Sig: take 1 tablet by mouth once daily         Last Office Visit:   3/31/2023      Next Visit Date:  Future Appointments   Date Time Provider Junaid Light   3/21/2024  2:45 PM Ankit Duncan, [facility]               Please approve or deny.

## 2023-04-04 NOTE — TELEPHONE ENCOUNTER
Please approve or deny this refill request. The order is pended. Thank you.     LOV 3/31/2023    Next Visit Date:  Future Appointments   Date Time Provider Junaid Light   3/21/2024  2:45 PM Ankit Yepez, DO [address] [place] [address]

## 2023-05-05 ENCOUNTER — HOSPITAL ENCOUNTER (OUTPATIENT)
Dept: NUCLEAR MEDICINE | Age: 83
Discharge: HOME | End: 2023-05-07
Payer: COMMERCIAL

## 2023-05-05 ENCOUNTER — HOSPITAL ENCOUNTER (OUTPATIENT)
Dept: NON INVASIVE DIAGNOSTICS | Age: 83
Discharge: HOME | End: 2023-05-05
Payer: COMMERCIAL

## 2023-05-05 VITALS — HEART RATE: 75 BPM | DIASTOLIC BLOOD PRESSURE: 79 MMHG | SYSTOLIC BLOOD PRESSURE: 165 MMHG

## 2023-05-05 DIAGNOSIS — R94.31 ABNORMAL EKG: ICD-10-CM

## 2023-05-05 PROCEDURE — 93017 CV STRESS TEST TRACING ONLY: CPT

## 2023-05-05 PROCEDURE — 78452 HT MUSCLE IMAGE SPECT MULT: CPT

## 2023-05-05 PROCEDURE — 6360000002 HC RX W HCPCS: Performed by: INTERNAL MEDICINE

## 2023-05-05 PROCEDURE — A9502 TC99M TETROFOSMIN: HCPCS | Performed by: INTERNAL MEDICINE

## 2023-05-05 PROCEDURE — 3430000000 HC RX DIAGNOSTIC RADIOPHARMACEUTICAL: Performed by: INTERNAL MEDICINE

## 2023-05-05 PROCEDURE — 2500000003 HC RX 250 WO HCPCS: Performed by: INTERNAL MEDICINE

## 2023-05-05 PROCEDURE — 2500000003 HC RX 250 WO HCPCS

## 2023-05-05 PROCEDURE — 2580000003 HC RX 258: Performed by: INTERNAL MEDICINE

## 2023-05-05 RX ORDER — METOPROLOL TARTRATE 5 MG/5ML
5 INJECTION INTRAVENOUS ONCE
Status: COMPLETED | OUTPATIENT
Start: 2023-05-05 | End: 2023-05-05

## 2023-05-05 RX ORDER — LABETALOL 20 MG/4 ML (5 MG/ML) INTRAVENOUS SYRINGE
20 ONCE
Status: DISCONTINUED | OUTPATIENT
Start: 2023-05-05 | End: 2023-05-06 | Stop reason: HOSPADM

## 2023-05-05 RX ORDER — SODIUM CHLORIDE 0.9 % (FLUSH) 0.9 %
10 SYRINGE (ML) INJECTION PRN
Status: COMPLETED | OUTPATIENT
Start: 2023-05-05 | End: 2023-05-05

## 2023-05-05 RX ADMIN — Medication 10 ML: at 12:27

## 2023-05-05 RX ADMIN — Medication 10 ML: at 12:28

## 2023-05-05 RX ADMIN — METOPROLOL TARTRATE 5 MG: 1 INJECTION, SOLUTION INTRAVENOUS at 13:16

## 2023-05-05 RX ADMIN — TETROFOSMIN 11.9 MILLICURIE: 1.38 INJECTION, POWDER, LYOPHILIZED, FOR SOLUTION INTRAVENOUS at 10:55

## 2023-05-05 RX ADMIN — Medication 10 ML: at 10:55
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
## 2023-05-05 NOTE — PROGRESS NOTES
Call to dr. Benedicto Jain due to pts resting BP of 263/130 told to give Lopressor 5mg IV. Patient given this at [phone] patient tolerated well. Patient is ok to leave after last pictures and recheck her BP at home. If still elevated she should go to the ER.

## 2023-05-23 ENCOUNTER — TELEPHONE (OUTPATIENT)
Dept: CARDIOLOGY CLINIC | Age: 83
End: 2023-05-23

## 2023-05-23 NOTE — TELEPHONE ENCOUNTER
Pt son, Rosaura Sherri calling to let us know that pt had cancelled 2nd half of stress test.    States that they are unable to get it rescheduled until we get the approval for pt to redo test.     If pt redoes the stress test will insurance cover both tests?     Pt # [phone]

## 2023-05-24 NOTE — TELEPHONE ENCOUNTER
Check with stress lab as what to do. She should be able to be brought back just to finish the second part without redoing another   Stress order.  Stress lab can only bill for one stress test, not two

## 2023-06-02 DIAGNOSIS — R94.31 ABNORMAL EKG: Primary | ICD-10-CM

## 2023-06-09 ENCOUNTER — APPOINTMENT (OUTPATIENT)
Dept: NON INVASIVE DIAGNOSTICS | Age: 83
End: 2023-06-09
Attending: INTERNAL MEDICINE
Payer: COMMERCIAL

## 2023-06-09 ENCOUNTER — HOSPITAL ENCOUNTER (OUTPATIENT)
Dept: NUCLEAR MEDICINE | Age: 83
End: 2023-06-09
Attending: INTERNAL MEDICINE
Payer: COMMERCIAL

## 2023-06-09 ENCOUNTER — APPOINTMENT (OUTPATIENT)
Dept: NUCLEAR MEDICINE | Age: 83
End: 2023-06-09
Attending: INTERNAL MEDICINE
Payer: COMMERCIAL

## 2023-06-09 ENCOUNTER — HOSPITAL ENCOUNTER (OUTPATIENT)
Dept: NON INVASIVE DIAGNOSTICS | Age: 83
Discharge: HOME | End: 2023-06-09
Attending: INTERNAL MEDICINE
Payer: COMMERCIAL

## 2023-06-09 ENCOUNTER — TELEPHONE (OUTPATIENT)
Dept: NON INVASIVE DIAGNOSTICS | Age: 83
End: 2023-06-09

## 2023-06-09 DIAGNOSIS — R94.31 ABNORMAL EKG: ICD-10-CM

## 2023-06-09 LAB
LV EF: 75 %
LVEF MODALITY: NORMAL

## 2023-06-09 PROCEDURE — 78452 HT MUSCLE IMAGE SPECT MULT: CPT

## 2023-06-09 PROCEDURE — 3430000000 HC RX DIAGNOSTIC RADIOPHARMACEUTICAL: Performed by: INTERNAL MEDICINE

## 2023-06-09 PROCEDURE — 2500000003 HC RX 250 WO HCPCS: Performed by: INTERNAL MEDICINE

## 2023-06-09 PROCEDURE — A9502 TC99M TETROFOSMIN: HCPCS | Performed by: INTERNAL MEDICINE

## 2023-06-09 PROCEDURE — 93017 CV STRESS TEST TRACING ONLY: CPT

## 2023-06-09 PROCEDURE — 2580000003 HC RX 258: Performed by: INTERNAL MEDICINE

## 2023-06-09 PROCEDURE — 6360000002 HC RX W HCPCS: Performed by: INTERNAL MEDICINE

## 2023-06-09 RX ORDER — REGADENOSON 0.08 MG/ML
0.4 INJECTION, SOLUTION INTRAVENOUS
Status: COMPLETED | OUTPATIENT
Start: 2023-06-09 | End: 2023-06-09

## 2023-06-09 RX ORDER — LABETALOL 20 MG/4 ML (5 MG/ML) INTRAVENOUS SYRINGE
20
Status: COMPLETED | OUTPATIENT
Start: 2023-06-09 | End: 2023-06-09

## 2023-06-09 RX ORDER — SODIUM CHLORIDE 0.9 % (FLUSH) 0.9 %
10 SYRINGE (ML) INJECTION PRN
Status: ACTIVE | OUTPATIENT
Start: 2023-06-09

## 2023-06-09 RX ADMIN — REGADENOSON 0.4 MG: 0.08 INJECTION, SOLUTION INTRAVENOUS at 11:50

## 2023-06-09 RX ADMIN — TETROFOSMIN 35.6 MILLICURIE: 1.38 INJECTION, POWDER, LYOPHILIZED, FOR SOLUTION INTRAVENOUS at 11:50

## 2023-06-09 RX ADMIN — LABETALOL HYDROCHLORIDE 20 MG: 5 INJECTION, SOLUTION INTRAVENOUS at 12:05

## 2023-06-09 RX ADMIN — TETROFOSMIN 11.9 MILLICURIE: 1.38 INJECTION, POWDER, LYOPHILIZED, FOR SOLUTION INTRAVENOUS at 10:15

## 2023-06-09 RX ADMIN — Medication 10 ML: at 10:15

## 2023-06-09 NOTE — PROGRESS NOTES
Reviewed history, allergies, and medications. Consent confirmed. Lexiscan exam explained. Placed patient on monitor. @ [address] here to inject [place]. SOB noted during recovery phase. Denied chest pain. No ectopy noted. Doctor to further review and interpret results. Patient off monitor and instructed to eat, will have last part of exam in 1 hour. Noted htn and orders from Dr. Hernandez Brandon for labetalol 20 mg iv given. Plan to recheck BP and if <220 then he recommends we send her to the ER.

## 2023-06-09 NOTE — TELEPHONE ENCOUNTER
Patient has been in my department 2 x now for stress testing and has significant hypertension. We have given the patient IV beta-blockers both times with some relief. Patient states that she took her medications all prior to testing but on arrival her BP was 255/114 then at highest was 243/153 after labetalol patient was at her lowest during testing at 208/99. After 1 hour and testing was finished her Bp was back to severely elevated 255/119. Her and her son were given the recommendation that she go to the ER, they both refused. I discussed with just the patient that I feel that she is at a very high risk to have a stroke. And I feel that she could use home health care maybe to sort out her medications and see that she is taking them as prescribed. Her son seems very frustrated and was borderline rude to me when I discussed the need for the ER. The patient needs to have written communication due to her hearing loss as well which is how I communicated to her. I hope that with combined care we can be sure this patient gets the care she needs.

## 2023-06-15 ENCOUNTER — OFFICE VISIT (OUTPATIENT)
Dept: FAMILY MEDICINE CLINIC | Age: 83
End: 2023-06-15
Payer: COMMERCIAL

## 2023-06-15 VITALS
WEIGHT: 116 LBS | DIASTOLIC BLOOD PRESSURE: 82 MMHG | SYSTOLIC BLOOD PRESSURE: 152 MMHG | BODY MASS INDEX: 19.81 KG/M2 | HEIGHT: 64 IN | OXYGEN SATURATION: 98 % | HEART RATE: 63 BPM | TEMPERATURE: 99.2 F

## 2023-06-15 DIAGNOSIS — L30.9 DERMATITIS: ICD-10-CM

## 2023-06-15 DIAGNOSIS — D49.2 ABNORMAL SKIN GROWTH: ICD-10-CM

## 2023-06-15 DIAGNOSIS — I10 UNCONTROLLED HYPERTENSION: Primary | ICD-10-CM

## 2023-06-15 PROCEDURE — 1123F ACP DISCUSS/DSCN MKR DOCD: CPT | Performed by: FAMILY MEDICINE

## 2023-06-15 PROCEDURE — 3074F SYST BP LT 130 MM HG: CPT | Performed by: FAMILY MEDICINE

## 2023-06-15 PROCEDURE — 99214 OFFICE O/P EST MOD 30 MIN: CPT | Performed by: FAMILY MEDICINE

## 2023-06-15 PROCEDURE — 3078F DIAST BP <80 MM HG: CPT | Performed by: FAMILY MEDICINE

## 2023-06-15 RX ORDER — HYDROXYZINE HYDROCHLORIDE 25 MG/1
25 TABLET, FILM COATED ORAL EVERY 8 HOURS PRN
Qty: 60 TABLET | Refills: 0 | Status: SHIPPED | OUTPATIENT
Start: 2023-06-15 | End: 2023-07-15

## 2023-06-15 RX ORDER — TRIAMCINOLONE ACETONIDE 1 MG/G
CREAM TOPICAL
Qty: 454 G | Refills: 0 | Status: SHIPPED | OUTPATIENT
Start: 2023-06-15

## 2023-06-15 RX ORDER — CARVEDILOL 25 MG/1
25 TABLET ORAL 2 TIMES DAILY
Qty: 180 TABLET | Refills: 3 | Status: SHIPPED | OUTPATIENT
Start: 2023-06-15

## 2023-06-15 RX ORDER — IVERMECTIN 10 MG/G
1 CREAM TOPICAL ONCE
Qty: 45 G | Refills: 2 | Status: SHIPPED | OUTPATIENT
Start: 2023-06-15 | End: 2023-06-15

## 2023-06-19 ENCOUNTER — CARE COORDINATION (OUTPATIENT)
Dept: CARE COORDINATION | Age: 83
End: 2023-06-19

## 2023-06-19 ASSESSMENT — ENCOUNTER SYMPTOMS
ABDOMINAL DISTENTION: 0
PHOTOPHOBIA: 0
ABDOMINAL PAIN: 0
CHEST TIGHTNESS: 0
SHORTNESS OF BREATH: 0

## 2023-06-19 NOTE — CARE COORDINATION
ACM called patient back message requesting return call to introduce [facility] program.  Contact information supplied. Letter sent via [address] mail.

## 2023-06-23 ENCOUNTER — OFFICE VISIT (OUTPATIENT)
Dept: CARDIOLOGY CLINIC | Age: 83
End: 2023-06-23
Payer: COMMERCIAL

## 2023-06-23 VITALS
SYSTOLIC BLOOD PRESSURE: 180 MMHG | BODY MASS INDEX: 19.84 KG/M2 | OXYGEN SATURATION: 96 % | DIASTOLIC BLOOD PRESSURE: 82 MMHG | HEART RATE: 85 BPM | WEIGHT: 115.6 LBS

## 2023-06-23 DIAGNOSIS — I10 PRIMARY HYPERTENSION: Primary | ICD-10-CM

## 2023-06-23 PROCEDURE — 3079F DIAST BP 80-89 MM HG: CPT | Performed by: INTERNAL MEDICINE

## 2023-06-23 PROCEDURE — 99213 OFFICE O/P EST LOW 20 MIN: CPT | Performed by: INTERNAL MEDICINE

## 2023-06-23 PROCEDURE — 3077F SYST BP >= 140 MM HG: CPT | Performed by: INTERNAL MEDICINE

## 2023-06-23 PROCEDURE — 1123F ACP DISCUSS/DSCN MKR DOCD: CPT | Performed by: INTERNAL MEDICINE

## 2023-06-23 ASSESSMENT — ENCOUNTER SYMPTOMS
COUGH: 0
EYES NEGATIVE: 1
CHEST TIGHTNESS: 0
WHEEZING: 0
STRIDOR: 0
RESPIRATORY NEGATIVE: 1
GASTROINTESTINAL NEGATIVE: 1
BLOOD IN STOOL: 0
NAUSEA: 0
SHORTNESS OF BREATH: 0

## 2023-06-23 NOTE — PROGRESS NOTES
OFFICE VISIT        Patient: Bunny Richmond  YOB: 1940  MRN: [id number]    Chief Complaint: HTN   Chief Complaint   Patient presents with    Results     STRESS TEST        CV Data:  11/21 MAIK EF 60%   3/22 Renal Duplex negative ARIADNA. Subjective/HPI in ER yesterday for HTN. she is compliant with meds. But still elevated. CT Chest noted. Renal duplex no ariadna. Pt has no cp no sob. She wants to go back exercising. 6/3/22 does not feel well. BP elevated. Not compliant with meds due to being tired. She stopped Clonindine on her own. She is only taking coreg qd.     nonsmoker  No etoh  Lives with son. 3-31-23: she would like to switch cardiologists. Patient with history of minor stroke/TIA. Status post negative MAIK in November 2021. Ejection fraction of 60% at that time. History of negative renal artery duplex ultrasound. Blood pressure was elevated in the office today. EKG with normal sinus rhythm questionable anterior septal myocardial infarction. She denies any chest pain or shortness of breath. She does complain of dizziness that she can hardly walk. She has thyroid disease as well as hearing issues she was supposed to have ear surgery. 6/23/2023: Patient status post normal nuclear stress test in June 9, 2023. She continues to have elevated blood pressure. She was noted to have significant evaded blood pressure during stress test.  Today is 180/82.     EKG:    Past Medical History:   Diagnosis Date    Allergic rhinitis     Asthma     uses flovent    Hearing loss     Hyperlipidemia     Hypertension     Hypothyroidism     Lichen planus     Dr. Alice Woodruff       Past Surgical History:   Procedure Laterality Date    HYSTERECTOMY, TOTAL ABDOMINAL (CERVIX REMOVED)      TONSILLECTOMY         Family History   Problem Relation Age of Onset    High Blood Pressure Mother        Social History     Socioeconomic History    Marital status: Single    Number of children: 2   Tobacco Use    Smoking

## 2023-06-29 ENCOUNTER — OFFICE VISIT (OUTPATIENT)
Dept: FAMILY MEDICINE CLINIC | Age: 83
End: 2023-06-29
Payer: COMMERCIAL

## 2023-06-29 VITALS
SYSTOLIC BLOOD PRESSURE: 210 MMHG | BODY MASS INDEX: 19.63 KG/M2 | OXYGEN SATURATION: 98 % | DIASTOLIC BLOOD PRESSURE: 100 MMHG | TEMPERATURE: 97.8 F | HEART RATE: 68 BPM | WEIGHT: 115 LBS | HEIGHT: 64 IN

## 2023-06-29 DIAGNOSIS — F41.9 ANXIETY: ICD-10-CM

## 2023-06-29 DIAGNOSIS — L30.9 DERMATITIS: Primary | ICD-10-CM

## 2023-06-29 PROCEDURE — 3080F DIAST BP >= 90 MM HG: CPT | Performed by: FAMILY MEDICINE

## 2023-06-29 PROCEDURE — 1123F ACP DISCUSS/DSCN MKR DOCD: CPT | Performed by: FAMILY MEDICINE

## 2023-06-29 PROCEDURE — 3077F SYST BP >= 140 MM HG: CPT | Performed by: FAMILY MEDICINE

## 2023-06-29 PROCEDURE — 99214 OFFICE O/P EST MOD 30 MIN: CPT | Performed by: FAMILY MEDICINE

## 2023-06-29 RX ORDER — ALPRAZOLAM 0.25 MG/1
0.25 TABLET ORAL 3 TIMES DAILY PRN
Qty: 21 TABLET | Refills: 0 | Status: SHIPPED | OUTPATIENT
Start: 2023-06-29 | End: 2023-07-06

## 2023-06-29 ASSESSMENT — ENCOUNTER SYMPTOMS
PHOTOPHOBIA: 0
SHORTNESS OF BREATH: 0
ABDOMINAL PAIN: 0
CHEST TIGHTNESS: 0
ABDOMINAL DISTENTION: 0

## 2023-07-06 ENCOUNTER — OFFICE VISIT (OUTPATIENT)
Dept: FAMILY MEDICINE CLINIC | Age: 83
End: 2023-07-06
Payer: COMMERCIAL

## 2023-07-06 VITALS
HEIGHT: 64 IN | WEIGHT: 115 LBS | DIASTOLIC BLOOD PRESSURE: 72 MMHG | HEART RATE: 72 BPM | BODY MASS INDEX: 19.63 KG/M2 | OXYGEN SATURATION: 98 % | SYSTOLIC BLOOD PRESSURE: 160 MMHG | TEMPERATURE: 98.7 F

## 2023-07-06 DIAGNOSIS — I10 UNCONTROLLED HYPERTENSION: ICD-10-CM

## 2023-07-06 DIAGNOSIS — L30.9 DERMATITIS: Primary | ICD-10-CM

## 2023-07-06 DIAGNOSIS — F41.9 ANXIETY: ICD-10-CM

## 2023-07-06 PROCEDURE — 99212 OFFICE O/P EST SF 10 MIN: CPT | Performed by: FAMILY MEDICINE

## 2023-07-06 PROCEDURE — 3078F DIAST BP <80 MM HG: CPT | Performed by: FAMILY MEDICINE

## 2023-07-06 PROCEDURE — 3077F SYST BP >= 140 MM HG: CPT | Performed by: FAMILY MEDICINE

## 2023-07-06 PROCEDURE — 1123F ACP DISCUSS/DSCN MKR DOCD: CPT | Performed by: FAMILY MEDICINE

## 2023-07-06 NOTE — PATIENT INSTRUCTIONS
Blood pressure is better today than last week despite not starting Xanax. Patient's family will call with blood pressure measurements after patient has tried Xanax for a day or 2.     We will return for office visit in 2 to 3 weeks to recheck blood pressure results and skin lesion on the right upper thigh

## 2023-07-06 NOTE — PROGRESS NOTES
Diagnosis Orders   1. Dermatitis        2. Anxiety        3. Uncontrolled hypertension          Return in about 2 weeks (around 7/20/2023) for for review of outcome of today's recommendation. Patient Instructions   Blood pressure is better today than last week despite not starting Xanax. Patient's family will call with blood pressure measurements after patient has tried Xanax for a day or 2. We will return for office visit in 2 to 3 weeks to recheck blood pressure results and skin lesion on the right upper thigh  Subjective:      Patient ID: Franko Burns is a 81 y.o. female who presents for:  Chief Complaint   Patient presents with    Skin Exam    Anxiety     Has not had xanax- had to get enough money for it        Here for follow-up of Xanax treatment for anxiety and increased blood pressure. Patient and family did not  medication due to time and money. They will be picking it up today. We talked about methods of even picking up partial prescription for a trial.    Patient states lesion on the leg is shrinking but still present. Current Outpatient Medications on File Prior to Visit   Medication Sig Dispense Refill    ALPRAZolam (XANAX) 0.25 MG tablet Take 1 tablet by mouth 3 times daily as needed for Anxiety for up to 7 days. Max Daily Amount: 0.75 mg 21 tablet 0    carvedilol (COREG) 25 MG tablet Take 1 tablet by mouth 2 times daily 180 tablet 3    triamcinolone (KENALOG) 0.1 % cream Apply topically 2 times daily.  454 g 0    hydrOXYzine HCl (ATARAX) 25 MG tablet Take 1 tablet by mouth every 8 hours as needed for Itching 60 tablet 0    levothyroxine (SYNTHROID) 100 MCG tablet take 1 tablet by mouth once daily 30 tablet 2    isosorbide mononitrate (IMDUR) 60 MG extended release tablet take 1 tablet by mouth once daily 90 tablet 3    isosorbide mononitrate (IMDUR) 60 MG extended release tablet take 1 tablet by mouth once daily 90 tablet 3    lisinopril (PRINIVIL;ZESTRIL) 40 MG tablet Take 1

## 2023-07-13 ENCOUNTER — CARE COORDINATION (OUTPATIENT)
Dept: CARE COORDINATION | Age: 83
End: 2023-07-13

## 2023-07-13 NOTE — CARE COORDINATION
ACM called patient left message requesting return call to introduce [facility] program.  Contact information supplied. Second letter sent via [facility].

## 2023-07-25 ENCOUNTER — CARE COORDINATION (OUTPATIENT)
Dept: CARE COORDINATION | Age: 83
End: 2023-07-25

## 2023-07-25 NOTE — CARE COORDINATION
Ambulatory Care Management Note        Date/Time:  7/25/2023 4:20 PM    This patient was received as a referral from  Provider for case management of chronic conditions . Multiple unsuccessful attempts have been made to contact patient. Ambulatory Care Management get in touch letter mailed to the patient's address on file. No further outreach attempts are scheduled by [facility] at this time.

## 2023-08-31 DIAGNOSIS — I10 UNCONTROLLED HYPERTENSION: ICD-10-CM

## 2023-08-31 DIAGNOSIS — N17.9 AKI (ACUTE KIDNEY INJURY) (HCC): ICD-10-CM

## 2023-09-01 NOTE — TELEPHONE ENCOUNTER
Comments: na to make future appt     Last Office Visit (last PCP visit):   7/6/2023    Next Visit Date:  Future Appointments   Date Time Provider [address]   3/21/2024  2:45 PM Ankit Rodríguez, DO [address] [facility]   6/27/2024  3:00 PM Ankit Coats Luis Fernando Holsarabjit, DO [address] [facility]       **If hasn't been seen in over a year OR hasn't followed up according to last diabetes/ADHD visit, make appointment for patient before sending refill to provider.     Rx requested:  Requested Prescriptions     Pending Prescriptions Disp Refills    lisinopril (PRINIVIL;ZESTRIL) 40 MG tablet [Pharmacy Med Name: LISINOPRIL 40 MG TABLET] 90 tablet 2     Sig: take 1 tablet by mouth once daily    levothyroxine (SYNTHROID) 100 MCG tablet [Pharmacy Med Name: LEVOTHYROXINE 100 MCG TABLET] 30 tablet 2     Sig: take 1 tablet by mouth once daily

## 2023-09-07 RX ORDER — LISINOPRIL 40 MG/1
TABLET ORAL
Qty: 90 TABLET | Refills: 2 | Status: SHIPPED | OUTPATIENT
Start: 2023-09-07

## 2023-09-07 RX ORDER — LEVOTHYROXINE SODIUM 0.1 MG/1
TABLET ORAL
Qty: 30 TABLET | Refills: 2 | Status: SHIPPED | OUTPATIENT
Start: 2023-09-07

## 2023-11-25 DIAGNOSIS — E03.9 HYPOTHYROIDISM, UNSPECIFIED TYPE: ICD-10-CM

## 2023-11-25 DIAGNOSIS — E55.9 VITAMIN D DEFICIENCY: ICD-10-CM

## 2023-11-25 DIAGNOSIS — D64.9 ANEMIA, UNSPECIFIED TYPE: ICD-10-CM

## 2023-11-25 DIAGNOSIS — I10 UNCONTROLLED HYPERTENSION: ICD-10-CM

## 2023-11-25 DIAGNOSIS — N17.9 AKI (ACUTE KIDNEY INJURY) (HCC): Primary | ICD-10-CM

## 2023-11-26 NOTE — TELEPHONE ENCOUNTER
Comments: Please review    Last Office Visit (last PCP visit):   7/6/2023    Next Visit Date:  Future Appointments   Date Time Provider [address]   3/21/2024  2:45 PM Holiday, [id number] [facility], DO [address] [facility]   6/27/2024  3:00 PM Holiday, Ankit DOBBS, DO [address] [facility]       **If hasn't been seen in over a year OR hasn't followed up according to last diabetes/ADHD visit, make appointment for patient before sending refill to provider.     Rx requested:  Requested Prescriptions     Pending Prescriptions Disp Refills    atorvastatin (LIPITOR) 20 MG tablet [Pharmacy Med Name: ATORVASTATIN 20 MG TABLET] 90 tablet 3     Sig: take 1 tablet by mouth once daily

## 2023-11-29 RX ORDER — ATORVASTATIN CALCIUM 20 MG/1
20 TABLET, FILM COATED ORAL DAILY
Qty: 30 TABLET | Refills: 0 | Status: SHIPPED | OUTPATIENT
Start: 2023-11-29

## 2023-12-29 ENCOUNTER — TELEPHONE (OUTPATIENT)
Dept: FAMILY MEDICINE CLINIC | Age: 83
End: 2023-12-29

## 2024-01-03 RX ORDER — ATORVASTATIN CALCIUM 20 MG/1
TABLET, FILM COATED ORAL
Qty: 30 TABLET | Refills: 0 | Status: SHIPPED | OUTPATIENT
Start: 2024-01-03

## 2024-01-03 NOTE — TELEPHONE ENCOUNTER
Comments: Please review    Last Office Visit (last PCP visit):   7/6/2023    Next Visit Date:  Future Appointments   Date Time Provider Department Center   1/17/2024  2:00 PM Dean Blum MD [facility]   3/21/2024  2:45 PM Holiday, DO [facility]   6/27/2024  3:00 PM Holiday, DO Dee Dee Velasquez       **If hasn't been seen in over a year OR hasn't followed up according to last diabetes/ADHD visit, make appointment for patient before sending refill to provider.    Rx requested:  Requested Prescriptions     Pending Prescriptions Disp Refills    atorvastatin (LIPITOR) 20 MG tablet [Pharmacy Med Name: ATORVASTATIN 20 MG TABLET] 30 tablet 0     Sig: take 1 tablet by mouth once daily (YOU ARE DUE FOR APPOINTMENT AND LABS. LABS ARE FASTING AND HAVE BEEN ORDERED)

## 2024-01-17 ENCOUNTER — APPOINTMENT (OUTPATIENT)
Dept: GENERAL RADIOLOGY | Age: 84
End: 2024-01-17
Payer: COMMERCIAL

## 2024-01-17 ENCOUNTER — APPOINTMENT (OUTPATIENT)
Dept: CT IMAGING | Age: 84
End: 2024-01-17
Payer: COMMERCIAL

## 2024-01-17 ENCOUNTER — OFFICE VISIT (OUTPATIENT)
Dept: FAMILY MEDICINE CLINIC | Age: 84
End: 2024-01-17
Payer: COMMERCIAL

## 2024-01-17 ENCOUNTER — HOSPITAL ENCOUNTER (EMERGENCY)
Age: 84
Discharge: HOME | End: 2024-01-17
Payer: COMMERCIAL

## 2024-01-17 VITALS
BODY MASS INDEX: 20.32 KG/M2 | WEIGHT: 119 LBS | DIASTOLIC BLOOD PRESSURE: 94 MMHG | SYSTOLIC BLOOD PRESSURE: 174 MMHG | HEIGHT: 64 IN | RESPIRATION RATE: 16 BRPM | HEART RATE: 75 BPM | TEMPERATURE: 97.5 F | OXYGEN SATURATION: 97 %

## 2024-01-17 VITALS
DIASTOLIC BLOOD PRESSURE: 100 MMHG | BODY MASS INDEX: 20.32 KG/M2 | TEMPERATURE: 97.9 F | SYSTOLIC BLOOD PRESSURE: 230 MMHG | HEART RATE: 97 BPM | OXYGEN SATURATION: 95 % | HEIGHT: 64 IN | WEIGHT: 119 LBS

## 2024-01-17 DIAGNOSIS — I16.9 HYPERTENSIVE CRISIS: Primary | ICD-10-CM

## 2024-01-17 DIAGNOSIS — T83.511A URINARY TRACT INFECTION ASSOCIATED WITH CATHETERIZATION OF URINARY TRACT, UNSPECIFIED INDWELLING URINARY CATHETER TYPE, INITIAL ENCOUNTER (HCC): ICD-10-CM

## 2024-01-17 DIAGNOSIS — T76.91XA SUSPECTED ELDER ABUSE, INITIAL ENCOUNTER: ICD-10-CM

## 2024-01-17 DIAGNOSIS — I16.0 HYPERTENSIVE URGENCY: Primary | ICD-10-CM

## 2024-01-17 DIAGNOSIS — N39.0 URINARY TRACT INFECTION ASSOCIATED WITH CATHETERIZATION OF URINARY TRACT, UNSPECIFIED INDWELLING URINARY CATHETER TYPE, INITIAL ENCOUNTER (HCC): ICD-10-CM

## 2024-01-17 LAB
ALBUMIN SERPL-MCNC: 4.1 G/DL (ref 3.5–4.6)
ALP SERPL-CCNC: 79 U/L (ref 40–130)
ALT SERPL-CCNC: 13 U/L (ref 0–33)
ANION GAP SERPL CALCULATED.3IONS-SCNC: 14 MEQ/L (ref 9–15)
AST SERPL-CCNC: 26 U/L (ref 0–35)
BACTERIA URNS QL MICRO: ABNORMAL /HPF
BASOPHILS # BLD: 0 K/UL (ref 0–0.2)
BASOPHILS NFR BLD: 0.6 %
BILIRUB SERPL-MCNC: 0.7 MG/DL (ref 0.2–0.7)
BILIRUB UR QL STRIP: NEGATIVE
BUN SERPL-MCNC: 14 MG/DL (ref 8–23)
CALCIUM SERPL-MCNC: 9.7 MG/DL (ref 8.5–9.9)
CHLORIDE SERPL-SCNC: 99 MEQ/L (ref 95–107)
CLARITY UR: ABNORMAL
CO2 SERPL-SCNC: 25 MEQ/L (ref 20–31)
COLOR UR: YELLOW
CREAT SERPL-MCNC: 0.97 MG/DL (ref 0.5–0.9)
EOSINOPHIL # BLD: 0 K/UL (ref 0–0.7)
EOSINOPHIL NFR BLD: 0.8 %
EPI CELLS #/AREA URNS AUTO: ABNORMAL /HPF (ref 0–5)
ERYTHROCYTE [DISTWIDTH] IN BLOOD BY AUTOMATED COUNT: 13.1 % (ref 11.5–14.5)
GLOBULIN SER CALC-MCNC: 4.8 G/DL (ref 2.3–3.5)
GLUCOSE SERPL-MCNC: 126 MG/DL (ref 70–99)
GLUCOSE UR STRIP-MCNC: NEGATIVE MG/DL
HCT VFR BLD AUTO: 35.7 % (ref 37–47)
HGB BLD-MCNC: 12.5 G/DL (ref 12–16)
HGB UR QL STRIP: NEGATIVE
HYALINE CASTS #/AREA URNS AUTO: ABNORMAL /HPF (ref 0–5)
KETONES UR STRIP-MCNC: NEGATIVE MG/DL
LEUKOCYTE ESTERASE UR QL STRIP: ABNORMAL
LYMPHOCYTES # BLD: 0.9 K/UL (ref 1–4.8)
LYMPHOCYTES NFR BLD: 17.4 %
MAGNESIUM SERPL-MCNC: 2.1 MG/DL (ref 1.7–2.4)
MCH RBC QN AUTO: 32.6 PG (ref 27–31.3)
MCHC RBC AUTO-ENTMCNC: 35 % (ref 33–37)
MCV RBC AUTO: 93 FL (ref 79.4–94.8)
MONOCYTES # BLD: 0.4 K/UL (ref 0.2–0.8)
MONOCYTES NFR BLD: 8.4 %
NEUTROPHILS # BLD: 3.7 K/UL (ref 1.4–6.5)
NEUTS SEG NFR BLD: 72.4 %
NITRITE UR QL STRIP: NEGATIVE
PH UR STRIP: 7.5 [PH] (ref 5–9)
PLATELET # BLD AUTO: 255 K/UL (ref 130–400)
POTASSIUM SERPL-SCNC: 4.2 MEQ/L (ref 3.4–4.9)
PROT SERPL-MCNC: 8.9 G/DL (ref 6.3–8)
PROT UR STRIP-MCNC: NEGATIVE MG/DL
RBC # BLD AUTO: 3.84 M/UL (ref 4.2–5.4)
RBC #/AREA URNS AUTO: ABNORMAL /HPF (ref 0–5)
SODIUM SERPL-SCNC: 138 MEQ/L (ref 135–144)
SP GR UR STRIP: 1.01 (ref 1–1.03)
TROPONIN, HIGH SENSITIVITY: 48 NG/L (ref 0–19)
TROPONIN, HIGH SENSITIVITY: 48 NG/L (ref 0–19)
URINE REFLEX TO CULTURE: YES
UROBILINOGEN UR STRIP-ACNC: 0.2 E.U./DL
WBC # BLD AUTO: 5.1 K/UL (ref 4.8–10.8)
WBC #/AREA URNS AUTO: >100 /HPF (ref 0–5)

## 2024-01-17 PROCEDURE — 84484 ASSAY OF TROPONIN QUANT: CPT

## 2024-01-17 PROCEDURE — 85025 COMPLETE CBC W/AUTO DIFF WBC: CPT

## 2024-01-17 PROCEDURE — 3080F DIAST BP >= 90 MM HG: CPT | Performed by: FAMILY MEDICINE

## 2024-01-17 PROCEDURE — 99215 OFFICE O/P EST HI 40 MIN: CPT | Performed by: FAMILY MEDICINE

## 2024-01-17 PROCEDURE — 36415 COLL VENOUS BLD VENIPUNCTURE: CPT

## 2024-01-17 PROCEDURE — 6360000002 HC RX W HCPCS: Performed by: PHYSICIAN ASSISTANT

## 2024-01-17 PROCEDURE — 71045 X-RAY EXAM CHEST 1 VIEW: CPT

## 2024-01-17 PROCEDURE — 96374 THER/PROPH/DIAG INJ IV PUSH: CPT

## 2024-01-17 PROCEDURE — 87086 URINE CULTURE/COLONY COUNT: CPT

## 2024-01-17 PROCEDURE — 96376 TX/PRO/DX INJ SAME DRUG ADON: CPT

## 2024-01-17 PROCEDURE — 80053 COMPREHEN METABOLIC PANEL: CPT

## 2024-01-17 PROCEDURE — 99285 EMERGENCY DEPT VISIT HI MDM: CPT

## 2024-01-17 PROCEDURE — 1123F ACP DISCUSS/DSCN MKR DOCD: CPT | Performed by: FAMILY MEDICINE

## 2024-01-17 PROCEDURE — 3077F SYST BP >= 140 MM HG: CPT | Performed by: FAMILY MEDICINE

## 2024-01-17 PROCEDURE — 87088 URINE BACTERIA CULTURE: CPT

## 2024-01-17 PROCEDURE — 83735 ASSAY OF MAGNESIUM: CPT

## 2024-01-17 PROCEDURE — 81001 URINALYSIS AUTO W/SCOPE: CPT

## 2024-01-17 PROCEDURE — 87186 SC STD MICRODIL/AGAR DIL: CPT

## 2024-01-17 PROCEDURE — 70450 CT HEAD/BRAIN W/O DYE: CPT

## 2024-01-17 PROCEDURE — 93005 ELECTROCARDIOGRAM TRACING: CPT | Performed by: STUDENT IN AN ORGANIZED HEALTH CARE EDUCATION/TRAINING PROGRAM

## 2024-01-17 RX ORDER — LABETALOL HYDROCHLORIDE 5 MG/ML
10 INJECTION, SOLUTION INTRAVENOUS ONCE
Status: COMPLETED | OUTPATIENT
Start: 2024-01-17 | End: 2024-01-17

## 2024-01-17 RX ORDER — LABETALOL HYDROCHLORIDE 5 MG/ML
20 INJECTION, SOLUTION INTRAVENOUS ONCE
Status: COMPLETED | OUTPATIENT
Start: 2024-01-17 | End: 2024-01-17

## 2024-01-17 RX ORDER — CEPHALEXIN 500 MG/1
500 CAPSULE ORAL 2 TIMES DAILY
Qty: 10 CAPSULE | Refills: 0 | Status: SHIPPED | OUTPATIENT
Start: 2024-01-17 | End: 2024-01-22

## 2024-01-17 RX ADMIN — LABETALOL HYDROCHLORIDE 10 MG: 5 INJECTION, SOLUTION INTRAVENOUS at 16:21

## 2024-01-17 RX ADMIN — LABETALOL HYDROCHLORIDE 20 MG: 5 INJECTION, SOLUTION INTRAVENOUS at 14:47

## 2024-01-17 ASSESSMENT — ENCOUNTER SYMPTOMS
SORE THROAT: 0
VOMITING: 0
SHORTNESS OF BREATH: 0
ABDOMINAL PAIN: 0
PHOTOPHOBIA: 0
ABDOMINAL DISTENTION: 0
RHINORRHEA: 0
CHEST TIGHTNESS: 0
NAUSEA: 0
COUGH: 0
SHORTNESS OF BREATH: 0
DIARRHEA: 0
PHOTOPHOBIA: 0
BACK PAIN: 0
EYE PAIN: 0
ABDOMINAL PAIN: 0

## 2024-01-17 ASSESSMENT — PATIENT HEALTH QUESTIONNAIRE - PHQ9
1. LITTLE INTEREST OR PLEASURE IN DOING THINGS: 0
SUM OF ALL RESPONSES TO PHQ QUESTIONS 1-9: 0
2. FEELING DOWN, DEPRESSED OR HOPELESS: 0
SUM OF ALL RESPONSES TO PHQ9 QUESTIONS 1 & 2: 0

## 2024-01-17 ASSESSMENT — LIFESTYLE VARIABLES
HOW MANY STANDARD DRINKS CONTAINING ALCOHOL DO YOU HAVE ON A TYPICAL DAY: PATIENT DOES NOT DRINK
HOW OFTEN DO YOU HAVE A DRINK CONTAINING ALCOHOL: NEVER

## 2024-01-17 ASSESSMENT — PAIN - FUNCTIONAL ASSESSMENT: PAIN_FUNCTIONAL_ASSESSMENT: 0-10

## 2024-01-17 ASSESSMENT — PAIN DESCRIPTION - PAIN TYPE: TYPE: ACUTE PAIN

## 2024-01-17 ASSESSMENT — PAIN SCALES - GENERAL: PAINLEVEL_OUTOF10: 6

## 2024-01-17 ASSESSMENT — PAIN DESCRIPTION - DESCRIPTORS: DESCRIPTORS: ACHING

## 2024-01-17 ASSESSMENT — PAIN DESCRIPTION - LOCATION: LOCATION: HEAD
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
## 2024-01-17 NOTE — ED PROVIDER NOTES
normal limits   TROPONIN - Abnormal; Notable for the following components:    Troponin, High Sensitivity 48 (*)     All other components within normal limits   URINALYSIS WITH REFLEX TO CULTURE - Abnormal; Notable for the following components:    Clarity, UA CLOUDY (*)     Leukocyte Esterase, Urine LARGE (*)     All other components within normal limits   MAGNESIUM   MICROSCOPIC URINALYSIS       All other labs were within normal range or not returned as of this dictation.    EMERGENCY DEPARTMENT COURSE and DIFFERENTIAL DIAGNOSIS/MDM:   Vitals:    Vitals:    01/17/24 1910 01/17/24 1911 01/17/24 1912 01/17/24 1913   BP: (!) 174/94 (!) 174/94     Pulse: 75 82 75 75   Resp: 17 15 14 16   Temp:       TempSrc:       SpO2: 95% 98% 98% 97%   Weight:       Height:           Initial evaluation of this patient was done by. RUPESH Burns.  Patient presented to the emergency room today from her PCP with hypertension and headache.  Patient was given labetalol with adequate decrease and blood pressure.  Lab work, chest x-ray, head CT, and EKG were all interpreted by RUPESH Burns.  Lab work is overall unremarkable including mildly elevated creatinine with normal BUN, 2 elevated troponins that are flat and not rising, no leukocytosis, baseline hemoglobin hematocrit.  Head CT shows no intracranial normalities.  Chest x-ray shows no acute cardiopulmonary processes, other than COPD.  EKG showed normal sinus rhythm.  Because EKG results are normal with flat, troponins are flat and non-rising, and no chest pain is reported by patient, very low suspicion for ACS.  Patient's care was transferred to me at 1800.  Patient later noted that she has dysuria and is concerned about a urinary tract infection.  Urinalysis was added on and shows large amount of leukocyte esterase, with symptoms this is concerning for urinary tract infection.  Discussed results with patient and her blood pressure has come down a good amount he does have a urinary

## 2024-01-20 LAB
BACTERIA UR CULT: ABNORMAL
BACTERIA UR CULT: ABNORMAL
EKG ATRIAL RATE: 80 BPM
EKG P AXIS: 62 DEGREES
EKG P-R INTERVAL: 170 MS
EKG Q-T INTERVAL: 380 MS
EKG QRS DURATION: 78 MS
EKG QTC CALCULATION (BAZETT): 438 MS
EKG R AXIS: 17 DEGREES
EKG T AXIS: 59 DEGREES
EKG VENTRICULAR RATE: 80 BPM
ORGANISM: ABNORMAL

## 2024-01-20 PROCEDURE — 93010 ELECTROCARDIOGRAM REPORT: CPT | Performed by: INTERNAL MEDICINE

## 2024-01-29 DIAGNOSIS — I10 UNCONTROLLED HYPERTENSION: ICD-10-CM

## 2024-01-29 RX ORDER — LEVOTHYROXINE SODIUM 0.1 MG/1
TABLET ORAL
Qty: 30 TABLET | Refills: 2 | OUTPATIENT
Start: 2024-01-29

## 2024-01-29 RX ORDER — ATORVASTATIN CALCIUM 20 MG/1
TABLET, FILM COATED ORAL
Qty: 30 TABLET | Refills: 0 | OUTPATIENT
Start: 2024-01-29

## 2024-01-29 NOTE — TELEPHONE ENCOUNTER
Future Appointments    Encounter Information   Provider Department Appt Notes   3/21/2024 Ankit Gross DO [facility] 1 year f/u   6/27/2024 Ankit Gross DO [facility] yearly     Past Visits    Date Provider Specialty Visit Type Primary Dx   01/17/2024 Dean Blum MD Family Medicine Office Visit Hypertensive crisis   07/06/2023 Dean Blum MD Family Medicine Office Visit Dermatitis   06/29/2023 Dean Blum MD Family Medicine Office Visit Dermatitis   06/23/2023 Ankit Gross DO Cardiology Office Visit Primary hypertension   06/15/2023 Dean Blum MD Family Medicine Office Visit Uncontrolled hypertension

## 2024-01-29 NOTE — TELEPHONE ENCOUNTER
Comments: lov with pcp 1/17/24    Last Office Visit (last PCP visit):   Visit date not found    Next Visit Date:  Future Appointments   Date Time Provider Department Center   3/21/2024  2:45 PM Holiday, DO Dee [facility]asquez   6/27/2024  3:00 PM Holiday, DO Dee Dee Velasquez       **If hasn't been seen in over a year OR hasn't followed up according to last diabetes/ADHD visit, make appointment for patient before sending refill to provider.    Rx requested:  Requested Prescriptions     Pending Prescriptions Disp Refills    atorvastatin (LIPITOR) 20 MG tablet [Pharmacy Med Name: ATORVASTATIN 20 MG TABLET] 30 tablet 0     Sig: take 1 tablet by mouth once daily (YOU ARE DUE FOR APPOINTMENT AND LABS. LABS ARE FASTING AND HAVE BEEN ORDERED)

## 2024-02-02 DIAGNOSIS — E03.9 HYPOTHYROIDISM, UNSPECIFIED TYPE: ICD-10-CM

## 2024-02-02 DIAGNOSIS — N17.9 AKI (ACUTE KIDNEY INJURY) (HCC): ICD-10-CM

## 2024-02-02 DIAGNOSIS — I10 UNCONTROLLED HYPERTENSION: ICD-10-CM

## 2024-02-02 DIAGNOSIS — E55.9 VITAMIN D DEFICIENCY: ICD-10-CM

## 2024-02-02 DIAGNOSIS — D64.9 ANEMIA, UNSPECIFIED TYPE: ICD-10-CM

## 2024-02-02 LAB
ALBUMIN SERPL-MCNC: 4.3 G/DL (ref 3.5–4.6)
ALP SERPL-CCNC: 74 U/L (ref 40–130)
ALT SERPL-CCNC: 12 U/L (ref 0–33)
ANION GAP SERPL CALCULATED.3IONS-SCNC: 14 MEQ/L (ref 9–15)
AST SERPL-CCNC: 27 U/L (ref 0–35)
BASOPHILS # BLD: 0 K/UL (ref 0–0.2)
BASOPHILS NFR BLD: 0.3 %
BILIRUB SERPL-MCNC: 0.5 MG/DL (ref 0.2–0.7)
BUN SERPL-MCNC: 21 MG/DL (ref 8–23)
CALCIUM SERPL-MCNC: 9.8 MG/DL (ref 8.5–9.9)
CHLORIDE SERPL-SCNC: 101 MEQ/L (ref 95–107)
CHOLEST SERPL-MCNC: 221 MG/DL (ref 0–199)
CO2 SERPL-SCNC: 23 MEQ/L (ref 20–31)
CREAT SERPL-MCNC: 1.02 MG/DL (ref 0.5–0.9)
EOSINOPHIL # BLD: 0 K/UL (ref 0–0.7)
EOSINOPHIL NFR BLD: 0.6 %
ERYTHROCYTE [DISTWIDTH] IN BLOOD BY AUTOMATED COUNT: 13.2 % (ref 11.5–14.5)
GLOBULIN SER CALC-MCNC: 5.2 G/DL (ref 2.3–3.5)
GLUCOSE SERPL-MCNC: 144 MG/DL (ref 70–99)
HCT VFR BLD AUTO: 38.9 % (ref 37–47)
HDLC SERPL-MCNC: 59 MG/DL (ref 40–59)
HGB BLD-MCNC: 13.3 G/DL (ref 12–16)
LDL CHOLESTEROL CALCULATED: 122 MG/DL (ref 0–129)
LYMPHOCYTES # BLD: 0.9 K/UL (ref 1–4.8)
LYMPHOCYTES NFR BLD: 12.2 %
MCH RBC QN AUTO: 31.9 PG (ref 27–31.3)
MCHC RBC AUTO-ENTMCNC: 34.2 % (ref 33–37)
MCV RBC AUTO: 93.3 FL (ref 79.4–94.8)
MONOCYTES # BLD: 0.5 K/UL (ref 0.2–0.8)
MONOCYTES NFR BLD: 7.1 %
NEUTROPHILS # BLD: 5.7 K/UL (ref 1.4–6.5)
NEUTS SEG NFR BLD: 79.7 %
PLATELET # BLD AUTO: 291 K/UL (ref 130–400)
POTASSIUM SERPL-SCNC: 4.7 MEQ/L (ref 3.4–4.9)
PROT SERPL-MCNC: 9.5 G/DL (ref 6.3–8)
RBC # BLD AUTO: 4.17 M/UL (ref 4.2–5.4)
SODIUM SERPL-SCNC: 138 MEQ/L (ref 135–144)
T4 FREE SERPL-MCNC: 1.37 NG/DL (ref 0.84–1.68)
TRIGLYCERIDE, FASTING: 198 MG/DL (ref 0–150)
TSH REFLEX: 6.13 UIU/ML (ref 0.44–3.86)
WBC # BLD AUTO: 7.2 K/UL (ref 4.8–10.8)

## 2024-02-03 LAB — VITAMIN D 25-HYDROXY: 35.9 NG/ML (ref 30–100)

## 2024-02-06 DIAGNOSIS — E03.9 HYPOTHYROIDISM, UNSPECIFIED TYPE: Primary | ICD-10-CM

## 2024-02-06 DIAGNOSIS — I10 UNCONTROLLED HYPERTENSION: ICD-10-CM

## 2024-02-06 DIAGNOSIS — N17.9 AKI (ACUTE KIDNEY INJURY) (HCC): ICD-10-CM

## 2024-02-06 RX ORDER — LEVOTHYROXINE SODIUM 112 UG/1
112 TABLET ORAL DAILY
Qty: 30 TABLET | Refills: 1 | Status: SHIPPED | OUTPATIENT
Start: 2024-02-06

## 2024-02-06 NOTE — RESULT ENCOUNTER NOTE
Okay.  That should not be enough to raise the level.  I am going to increase her dose slightly.  Be sure to take the medication on an empty stomach in the morning at least 20 minutes before other meds or other foods.    TSH lab ordered for repeat in 1 month.

## 2024-03-27 DIAGNOSIS — E03.9 HYPOTHYROIDISM, UNSPECIFIED TYPE: ICD-10-CM

## 2024-03-27 RX ORDER — LEVOTHYROXINE SODIUM 112 UG/1
112 TABLET ORAL DAILY
Qty: 30 TABLET | Refills: 2 | Status: SHIPPED | OUTPATIENT
Start: 2024-03-27

## 2024-04-03 DIAGNOSIS — N17.9 AKI (ACUTE KIDNEY INJURY) (HCC): ICD-10-CM

## 2024-04-03 DIAGNOSIS — E03.9 HYPOTHYROIDISM, UNSPECIFIED TYPE: ICD-10-CM

## 2024-04-03 LAB
ANION GAP SERPL CALCULATED.3IONS-SCNC: 16 MEQ/L (ref 9–15)
BUN SERPL-MCNC: 15 MG/DL (ref 8–23)
CALCIUM SERPL-MCNC: 9.6 MG/DL (ref 8.5–9.9)
CHLORIDE SERPL-SCNC: 103 MEQ/L (ref 95–107)
CO2 SERPL-SCNC: 22 MEQ/L (ref 20–31)
CREAT SERPL-MCNC: 0.95 MG/DL (ref 0.5–0.9)
GLUCOSE SERPL-MCNC: 133 MG/DL (ref 70–99)
POTASSIUM SERPL-SCNC: 4.5 MEQ/L (ref 3.4–4.9)
SODIUM SERPL-SCNC: 141 MEQ/L (ref 135–144)
T4 FREE SERPL-MCNC: 2.27 NG/DL (ref 0.84–1.68)
TSH REFLEX: 0.02 UIU/ML (ref 0.44–3.86)

## 2024-04-05 DIAGNOSIS — E03.9 HYPOTHYROIDISM, UNSPECIFIED TYPE: ICD-10-CM

## 2024-04-05 RX ORDER — LEVOTHYROXINE SODIUM 112 UG/1
112 TABLET ORAL DAILY
Qty: 90 TABLET | Refills: 0 | Status: SHIPPED | OUTPATIENT
Start: 2024-04-05 | End: 2024-07-04

## 2024-04-10 DIAGNOSIS — I16.0 HYPERTENSIVE URGENCY: ICD-10-CM

## 2024-04-10 RX ORDER — ISOSORBIDE MONONITRATE 60 MG/1
60 TABLET, EXTENDED RELEASE ORAL DAILY
Qty: 90 TABLET | Refills: 3 | Status: SHIPPED | OUTPATIENT
Start: 2024-04-10

## 2024-04-10 NOTE — TELEPHONE ENCOUNTER
Pt's son asking about lab results, Also asking if the levothyroxine can be dropped back to 100 the 112 is making her heart race.       Uses [facility] [phone]

## 2024-04-12 NOTE — RESULT ENCOUNTER NOTE
Notify patient her levothyroxine dose is a little too high this time.  If she does not want to change the micrograms dosage that she is taking she could just switch to taking it Monday through Friday without taking it Saturday and Sunday every week.    TSH with reflex to be ordered for 4 to 6 weeks from now if patient does that protocol.  If she is interested in changing micrograms please pend 100 mcg levothyroxine 30 tabs with 1 refill

## 2024-04-22 ENCOUNTER — OFFICE VISIT (OUTPATIENT)
Dept: FAMILY MEDICINE CLINIC | Age: 84
End: 2024-04-22
Payer: COMMERCIAL

## 2024-04-22 VITALS
SYSTOLIC BLOOD PRESSURE: 248 MMHG | WEIGHT: 114 LBS | HEIGHT: 64 IN | HEART RATE: 94 BPM | DIASTOLIC BLOOD PRESSURE: 100 MMHG | BODY MASS INDEX: 19.46 KG/M2 | OXYGEN SATURATION: 98 %

## 2024-04-22 DIAGNOSIS — H91.93 BILATERAL HEARING LOSS, UNSPECIFIED HEARING LOSS TYPE: ICD-10-CM

## 2024-04-22 DIAGNOSIS — I10 UNCONTROLLED HYPERTENSION: ICD-10-CM

## 2024-04-22 DIAGNOSIS — E03.9 HYPOTHYROIDISM, UNSPECIFIED TYPE: Primary | ICD-10-CM

## 2024-04-22 PROCEDURE — 3077F SYST BP >= 140 MM HG: CPT | Performed by: FAMILY MEDICINE

## 2024-04-22 PROCEDURE — 1123F ACP DISCUSS/DSCN MKR DOCD: CPT | Performed by: FAMILY MEDICINE

## 2024-04-22 PROCEDURE — 3080F DIAST BP >= 90 MM HG: CPT | Performed by: FAMILY MEDICINE

## 2024-04-22 PROCEDURE — 99214 OFFICE O/P EST MOD 30 MIN: CPT | Performed by: FAMILY MEDICINE

## 2024-04-22 RX ORDER — LEVOTHYROXINE SODIUM 0.1 MG/1
100 TABLET ORAL DAILY
Qty: 30 TABLET | Refills: 0 | Status: SHIPPED | OUTPATIENT
Start: 2024-04-22

## 2024-04-22 SDOH — ECONOMIC STABILITY: INCOME INSECURITY: HOW HARD IS IT FOR YOU TO PAY FOR THE VERY BASICS LIKE FOOD, HOUSING, MEDICAL CARE, AND HEATING?: NOT HARD AT ALL

## 2024-04-22 SDOH — ECONOMIC STABILITY: FOOD INSECURITY: WITHIN THE PAST 12 MONTHS, THE FOOD YOU BOUGHT JUST DIDN'T LAST AND YOU DIDN'T HAVE MONEY TO GET MORE.: NEVER TRUE

## 2024-04-22 SDOH — ECONOMIC STABILITY: FOOD INSECURITY: WITHIN THE PAST 12 MONTHS, YOU WORRIED THAT YOUR FOOD WOULD RUN OUT BEFORE YOU GOT MONEY TO BUY MORE.: NEVER TRUE

## 2024-04-22 ASSESSMENT — ENCOUNTER SYMPTOMS
ABDOMINAL PAIN: 0
PHOTOPHOBIA: 0
CHEST TIGHTNESS: 0
ABDOMINAL DISTENTION: 0
SHORTNESS OF BREATH: 0
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
## 2024-04-22 NOTE — CONSULTS
Carolina Arguelles (:  1981) is a 43 y.o. female,New patient, here for evaluation of the following chief complaint(s):  Rib Injury (Was being  to be put in car and care giver stated that they heard a pop in her ribs and then started having pain , Since Friday , CEFADORXIL 500 mg daily)      ASSESSMENT/PLAN:    ICD-10-CM    1. Intercostal muscle strain, initial encounter  S29.011A traMADol (ULTRAM) 50 MG tablet      2. Rib pain on right side  R07.81 XR RIBS RIGHT INCLUDE CHEST (MIN 3 VIEWS)          Currently taking muscle relaxer and tylenol and prednisone for chronic illness.  Initial xray: negative for rib fracture will call with radiologist results if they are different from the initial results   Follow up with your pcp in 7 days if symptoms persist or if symptoms worsen.    SUBJECTIVE/OBJECTIVE:    History provided by:  Patient and caregiver   used: No      HPI:   43 y.o. female presents with symptoms of right rib pain when being lifted by caregiver into the car there was a pop that was noted ongoing since Friday and pain started immediately after the sound. Denies chest pain, cough. Patient requires a caregiver as she is partially immobile due to her chronic conditions.  Vitals:    24 1339   BP: 136/86   Pulse: 87   Temp: 98.6 °F (37 °C)   TempSrc: Temporal   SpO2: 93%       Review of Systems    Physical Exam  Vitals and nursing note reviewed.   HENT:      Head: Normocephalic.      Nose: Nose normal.   Cardiovascular:      Rate and Rhythm: Normal rate and regular rhythm.      Pulses: Normal pulses.   Pulmonary:      Effort: Pulmonary effort is normal.      Breath sounds: Normal breath sounds.   Skin:     General: Skin is warm and dry.   Neurological:      Mental Status: She is alert and oriented to person, place, and time.      Motor: Weakness and atrophy present.      Comments: Hx of stroke and Renal failure.   Psychiatric:         Mood and Affect: Mood normal.          Chief Complaint   Patient presents with    Loss of Consciousness    Foot Injury        Patient is a 79 y.o. female who presents with a chief complaint of lightheadedness/dizziness and syncope. . Patient is followed on a regular basis by Dr. Ismael Ng MD.  Patient stated she has had about 3 episodes of lightheadedness/dizziness at rest or with exertion. She states she feels like lights are going to go out and she gets close to passing out. She denies any history of myocardial infarction, congestive heart failure or arrhythmia. Denies any history of stress test or cardiac catheterization. Patient is very hard of hearing. Initial neurological work-up is negative. Cardiac enzymes are negative. Patient with acute kidney injury currently improved. She is being treated for urinary tract infection. She does have history of hypertension as well as hyperlipidemia. Past Medical History:   Diagnosis Date    Allergic rhinitis     Asthma     uses flovent    Hearing loss     Hyperlipidemia     Hypertension     Hypothyroidism     Lichen planus     Dr. Ted Smith      Patient Active Problem List   Diagnosis    Hypothyroidism    Allergic rhinitis    Hypertension    Hyperlipidemia    Lichen planus    Hearing loss    Asthma    TIN (acute kidney injury) ([facility] [address]       Past Surgical History:   Procedure Laterality Date    HYSTERECTOMY, TOTAL ABDOMINAL      TONSILLECTOMY         Social History     Socioeconomic History    Marital status: Single     Spouse name: None    Number of children: 2    Years of education: None    Highest education level: None   Occupational History    None   Tobacco Use    Smoking status: Never Smoker    Smokeless tobacco: Never Used   Substance and Sexual Activity    Alcohol use: No    Drug use: No    Sexual activity: Not Currently   Other Topics Concern    None   Social History Narrative    Has 2 sons. One of her sons lives with her in a mobile home.      Social Determinants of Health     Financial Resource Strain:     Difficulty of Paying Living Expenses:    Food Insecurity:     Worried About Running Out of Food in the Last Year:     [address] in the Last Year:    Transportation Needs:     Lack of Transportation (Medical):      Lack of Transportation (Non-Medical):    Physical Activity:     Days of Exercise per Week:     Minutes of Exercise per Session:    Stress:     Feeling of Stress :    Social Connections:     Frequency of Communication with Friends and Family:     Frequency of Social Gatherings with Friends and Family:     Attends Baptism Services:     Active Member of Clubs or Organizations:     Attends Club or Organization Meetings:     Marital Status:    Intimate Partner Violence:     Fear of Current or Ex-Partner:     Emotionally Abused:     Physically Abused:     Sexually Abused:        Family History   Problem Relation Age of Onset    High Blood Pressure Mother        Current Facility-Administered Medications   Medication Dose Route Frequency Provider Last Rate Last Admin    metoprolol succinate (TOPROL XL) extended release tablet 100 mg  100 mg Oral Daily Suresh Ramsey DO   100 mg at 06/13/21 1531    fluticasone (FLOVENT HFA) 110 MCG/ACT inhaler 1 puff  1 puff Inhalation BID Suresh Ramsey DO        sodium chloride flush 0.9 % injection 5-40 mL  5-40 mL Intravenous 2 times per day HENRY Fraser NP        sodium chloride flush 0.9 % injection 5-40 mL  5-40 mL Intravenous PRN HENRY Fraser NP        0.9 % sodium chloride infusion  25 mL Intravenous PRN HENRY Fraser NP        enoxaparin (LOVENOX) injection 30 mg  30 mg Subcutaneous Daily HENRY Fraser NP   30 mg at 06/12/21 2210    ondansetron (ZOFRAN-ODT) disintegrating tablet 4 mg  4 mg Oral Q8H PRN HENRY Fraser NP        Or    ondansetron (ZOFRAN) injection 4 mg  4 mg Intravenous Q6H PRN HENRY Fraser NP       [facility] polyethylene glycol (GLYCOLAX) packet 17 g  17 g Oral Daily PRN Roetta Blitz, APRN - NP        acetaminophen (TYLENOL) tablet 650 mg  650 mg Oral Q6H PRN Roetta Blitz, APRN - NP   650 mg at 06/13/21 0559    Or    acetaminophen (TYLENOL) suppository 650 mg  650 mg Rectal Q6H PRN Roetta Blitz, APRN - NP        cefTRIAXone (ROCEPHIN) 1000 mg IVPB in 50 mL D5W minibag  1,000 mg Intravenous Q24H Roetta Blitz, APRN - NP   Stopped at 06/13/21 1651    0.9 % sodium chloride infusion   Intravenous Continuous Roetta Blitz, APRN - NP 75 mL/hr at 06/13/21 1605 New Bag at 06/13/21 1605    atorvastatin (LIPITOR) tablet 20 mg  20 mg Oral Nightly Roetta Blitz, APRN - NP   20 mg at 06/12/21 2210    calcium elemental (OSCAL) tablet 500 mg  500 mg Oral Daily Roetta Blitz, APRN - NP   500 mg at 06/13/21 0815    levothyroxine (SYNTHROID) tablet 75 mcg  75 mcg Oral Daily Roetta Blitz, APRN - NP   75 mcg at 06/13/21 0559       ALLERGIES: Albuterol, Amlodipine, and Metformin and related    Review of Systems   Constitutional: Negative. Negative for chills and fever. HENT: Negative. Eyes: Negative. Respiratory: Negative for shortness of breath and wheezing. Cardiovascular: Negative for chest pain, palpitations and leg swelling. Gastrointestinal: Negative. Negative for abdominal pain, nausea and vomiting. Endocrine: Negative. Genitourinary: Negative. Musculoskeletal: Negative. Skin: Negative. Negative for rash. Allergic/Immunologic: Negative. Neurological: Positive for syncope and light-headedness. Negative for dizziness, weakness and headaches. Psychiatric/Behavioral: Negative. VITALS:  Blood pressure (!) 136/59, pulse 73, temperature 97.9 °F (36.6 °C), temperature source Oral, resp. rate 17, height 5' 4\" (1.626 m), weight 125 lb (56.7 kg), SpO2 92 %. Body mass index is 21.46 kg/m². Physical Exam  Constitutional:       Appearance: She is well-developed.  She is not diaphoretic. HENT:      Head: Normocephalic and atraumatic. Eyes:      Pupils: Pupils are equal, round, and reactive to light. Neck:      Thyroid: No thyromegaly. Vascular: No JVD. Trachea: No tracheal deviation. Cardiovascular:      Rate and Rhythm: Normal rate and regular rhythm. Chest Wall: PMI is not displaced. Pulses: Intact distal pulses. Heart sounds: Normal heart sounds. Heart sounds not distant. No murmur heard. No friction rub. No gallop. No S3 sounds. Pulmonary:      Effort: No respiratory distress. Breath sounds: No wheezing or rales. Chest:      Chest wall: No tenderness. Abdominal:      General: Bowel sounds are normal. There is no distension. Palpations: Abdomen is soft. There is no mass. Tenderness: There is no abdominal tenderness. There is no guarding or rebound. Musculoskeletal:      Cervical back: Normal range of motion and neck supple. Skin:     General: Skin is warm and dry. Coloration: Skin is not pale. Findings: No erythema or rash. Neurological:      Mental Status: She is alert and oriented to person, place, and time. Cranial Nerves: No cranial nerve deficit. Psychiatric:         Behavior: Behavior normal.         Thought Content:  Thought content normal.         Judgment: Judgment normal.         LABS:  Recent Results (from the past 24 hour(s))   Basic Metabolic Panel w/ Reflex to MG    Collection Time: 06/13/21 12:23 PM   Result Value Ref Range    Sodium 136 135 - 144 mEq/L    Potassium reflex Magnesium 4.1 3.4 - 4.9 mEq/L    Chloride 104 95 - 107 mEq/L    CO2 18 (L) 20 - 31 mEq/L    Anion Gap 14 9 - 15 mEq/L    Glucose 84 70 - 99 mg/dL    BUN 27 (H) 8 - 23 mg/dL    CREATININE 1.22 (H) 0.50 - 0.90 mg/dL    GFR Non-African American 42.5 (L) >60    GFR  51.4 (L) >60    Calcium 9.6 8.5 - 9.9 mg/dL   CBC auto differential    Collection Time: 06/13/21 12:23 PM   Result Value Ref Range    WBC 4.8 4.8 - 10.8 K/uL    RBC 3.46 (L) 4.20 - 5.40 M/uL    Hemoglobin 11.2 (L) 12.0 - 16.0 g/dL    Hematocrit 32.2 (L) 37.0 - 47.0 %    MCV 93.1 82.0 - 100.0 fL    MCH 32.3 (H) 27.0 - 31.3 pg    MCHC 34.7 33.0 - 37.0 %    RDW 12.5 11.5 - 14.5 %    Platelets 283 233 - 415 K/uL    Neutrophils % 72.2 %    Lymphocytes % 17.0 %    Monocytes % 8.6 %    Eosinophils % 1.6 %    Basophils % 0.6 %    Neutrophils Absolute 3.4 1.4 - 6.5 K/uL    Lymphocytes Absolute 0.8 (L) 1.0 - 4.8 K/uL    Monocytes Absolute 0.4 0.2 - 0.8 K/uL    Eosinophils Absolute 0.1 0.0 - 0.7 K/uL    Basophils Absolute 0.0 0.0 - 0.2 K/uL     Troponin:   Lab Results   Component Value Date    TROPONINI <0.010 06/12/2021       EKG: normal sinus rhythm, nonspecific ST and T waves changes      ASSESSMENT:    Syncope/near syncope secondary to dehydration/hypovolemia most likely. Urinary tract infection    Acute kidney injury    Essential hypertension hyperlipidemia        PLAN:   1. As always, aggressive risk factor modification is strongly recommended. We should adhere to the JNC VIII guidelines for HTN management and the NCEPATP III guidelines for LDL-C management. 2. Check 2D echocardiogram  3. IV fluid hydration  4. Avoid nephrotoxic agents  5. Monitor on telemetry  6. Neuro recommendations  7. GI/DVT prophylaxis  8.  Further recommendation to follow      Electronically signed by Genna Denney DO on 6/13/2021 at 10:37 PM

## 2024-04-22 NOTE — PATIENT INSTRUCTIONS
Patient will decrease levothyroxine to 100 mcg daily.  Repeat lab in 4 weeks.    Patient is managing blood pressure at this time and under evaluation by specialty for these conditions related to hearing and blood pressure. Declines further intervention from this office.     Patient refuses emergency room.

## 2024-05-03 ENCOUNTER — TELEPHONE (OUTPATIENT)
Dept: FAMILY MEDICINE CLINIC | Age: 84
End: 2024-05-03

## 2024-05-28 DIAGNOSIS — E03.9 HYPOTHYROIDISM, UNSPECIFIED TYPE: ICD-10-CM

## 2024-05-29 DIAGNOSIS — N17.9 AKI (ACUTE KIDNEY INJURY) (HCC): ICD-10-CM

## 2024-05-29 RX ORDER — LISINOPRIL 40 MG/1
TABLET ORAL
Qty: 90 TABLET | Refills: 3 | Status: SHIPPED | OUTPATIENT
Start: 2024-05-29

## 2024-05-29 RX ORDER — LEVOTHYROXINE SODIUM 0.1 MG/1
100 TABLET ORAL DAILY
Qty: 30 TABLET | Refills: 0 | Status: SHIPPED | OUTPATIENT
Start: 2024-05-29

## 2024-05-29 NOTE — TELEPHONE ENCOUNTER
Comments:     Last Office Visit (last PCP visit):   4/22/2024    Next Visit Date:  Future Appointments   Date Time Provider Department Center   6/27/2024  3:00 PM Holiday, DO Dee Dee Velasquez       **If hasn't been seen in over a year OR hasn't followed up according to last diabetes/ADHD visit, make appointment for patient before sending refill to provider.    Rx requested:  Requested Prescriptions     Pending Prescriptions Disp Refills    levothyroxine (SYNTHROID) 100 MCG tablet [Pharmacy Med Name: LEVOTHYROXINE 100 MCG TABLET] 30 tablet 0     Sig: take 1 tablet by mouth once daily

## 2024-06-28 DIAGNOSIS — E03.9 HYPOTHYROIDISM, UNSPECIFIED TYPE: ICD-10-CM

## 2024-06-28 RX ORDER — LEVOTHYROXINE SODIUM 0.1 MG/1
100 TABLET ORAL DAILY
Qty: 30 TABLET | Refills: 0 | Status: SHIPPED | OUTPATIENT
Start: 2024-06-28

## 2024-06-28 RX ORDER — LEVOTHYROXINE SODIUM 112 UG/1
112 TABLET ORAL DAILY
Qty: 30 TABLET | Refills: 2 | Status: SHIPPED | OUTPATIENT
Start: 2024-06-28

## 2024-06-28 NOTE — TELEPHONE ENCOUNTER
Comments:   LMTCB      Last Office Visit (last PCP visit):   4/22/2024    Next Visit Date:  No future appointments.    **If hasn't been seen in over a year OR hasn't followed up according to last diabetes/ADHD visit, make appointment for patient before sending refill to provider.    Rx requested:  Requested Prescriptions     Pending Prescriptions Disp Refills    levothyroxine (SYNTHROID) 112 MCG tablet [Pharmacy Med Name: LEVOTHYROXINE 112 MCG TABLET] 30 tablet 2     Sig: take 1 tablet by mouth once daily    levothyroxine (SYNTHROID) 100 MCG tablet [Pharmacy Med Name: LEVOTHYROXINE 100 MCG TABLET] 30 tablet 0     Sig: take 1 tablet by mouth once daily

## 2024-07-28 DIAGNOSIS — E03.9 HYPOTHYROIDISM, UNSPECIFIED TYPE: ICD-10-CM

## 2024-07-29 RX ORDER — LEVOTHYROXINE SODIUM 0.1 MG/1
100 TABLET ORAL DAILY
Qty: 30 TABLET | Refills: 0 | Status: SHIPPED | OUTPATIENT
Start: 2024-07-29

## 2024-08-11 DIAGNOSIS — E03.9 HYPOTHYROIDISM, UNSPECIFIED TYPE: ICD-10-CM

## 2024-08-15 RX ORDER — ATORVASTATIN CALCIUM 20 MG/1
20 TABLET, FILM COATED ORAL DAILY
Qty: 90 TABLET | Refills: 3 | Status: SHIPPED | OUTPATIENT
Start: 2024-08-15

## 2024-08-15 RX ORDER — LEVOTHYROXINE SODIUM 0.1 MG/1
100 TABLET ORAL DAILY
Qty: 30 TABLET | Refills: 5 | Status: SHIPPED | OUTPATIENT
Start: 2024-08-15

## 2024-08-15 NOTE — TELEPHONE ENCOUNTER
Comments:     Last Office Visit (last PCP visit):   4/22/2024    Next Visit Date:  No future appointments.    **If hasn't been seen in over a year OR hasn't followed up according to last diabetes/ADHD visit, make appointment for patient before sending refill to provider.    Rx requested:  Requested Prescriptions     Pending Prescriptions Disp Refills    levothyroxine (SYNTHROID) 100 MCG tablet [Pharmacy Med Name: LEVOTHYROXINE 100 MCG TABLET] 30 tablet 0     Sig: take 1 tablet by mouth once daily    atorvastatin (LIPITOR) 20 MG tablet [Pharmacy Med Name: ATORVASTATIN 20 MG TABLET] 90 tablet      Sig: take 1 tablet by mouth once daily

## 2024-11-14 ENCOUNTER — TELEPHONE (OUTPATIENT)
Dept: FAMILY MEDICINE CLINIC | Age: 84
End: 2024-11-14

## 2025-02-28 ENCOUNTER — TELEPHONE (OUTPATIENT)
Dept: FAMILY MEDICINE CLINIC | Age: 85
End: 2025-02-28